# Patient Record
Sex: FEMALE | Race: BLACK OR AFRICAN AMERICAN | NOT HISPANIC OR LATINO
[De-identification: names, ages, dates, MRNs, and addresses within clinical notes are randomized per-mention and may not be internally consistent; named-entity substitution may affect disease eponyms.]

---

## 2017-06-26 ENCOUNTER — RESULT REVIEW (OUTPATIENT)
Age: 57
End: 2017-06-26

## 2020-01-21 ENCOUNTER — RESULT REVIEW (OUTPATIENT)
Age: 60
End: 2020-01-21

## 2020-03-02 ENCOUNTER — APPOINTMENT (OUTPATIENT)
Dept: MRI IMAGING | Facility: IMAGING CENTER | Age: 60
End: 2020-03-02
Payer: COMMERCIAL

## 2020-03-02 ENCOUNTER — OUTPATIENT (OUTPATIENT)
Dept: OUTPATIENT SERVICES | Facility: HOSPITAL | Age: 60
LOS: 1 days | End: 2020-03-02
Payer: COMMERCIAL

## 2020-03-02 DIAGNOSIS — R92.8 OTHER ABNORMAL AND INCONCLUSIVE FINDINGS ON DIAGNOSTIC IMAGING OF BREAST: ICD-10-CM

## 2020-03-02 PROCEDURE — 19085 BX BREAST 1ST LESION MR IMAG: CPT | Mod: LT

## 2020-03-02 PROCEDURE — 77065 DX MAMMO INCL CAD UNI: CPT | Mod: 26,LT

## 2020-03-09 ENCOUNTER — OUTPATIENT (OUTPATIENT)
Dept: OUTPATIENT SERVICES | Facility: HOSPITAL | Age: 60
LOS: 1 days | Discharge: HOME | End: 2020-03-09

## 2020-03-09 DIAGNOSIS — C50.919 MALIGNANT NEOPLASM OF UNSPECIFIED SITE OF UNSPECIFIED FEMALE BREAST: ICD-10-CM

## 2020-03-11 ENCOUNTER — RESULT REVIEW (OUTPATIENT)
Age: 60
End: 2020-03-11

## 2020-03-11 ENCOUNTER — APPOINTMENT (OUTPATIENT)
Dept: MAMMOGRAPHY | Facility: IMAGING CENTER | Age: 60
End: 2020-03-11
Payer: COMMERCIAL

## 2020-03-11 ENCOUNTER — OUTPATIENT (OUTPATIENT)
Dept: OUTPATIENT SERVICES | Facility: HOSPITAL | Age: 60
LOS: 1 days | End: 2020-03-11
Payer: COMMERCIAL

## 2020-03-11 DIAGNOSIS — Z00.8 ENCOUNTER FOR OTHER GENERAL EXAMINATION: ICD-10-CM

## 2020-03-11 PROCEDURE — 88342 IMHCHEM/IMCYTCHM 1ST ANTB: CPT | Mod: 26,59

## 2020-03-11 PROCEDURE — 88360 TUMOR IMMUNOHISTOCHEM/MANUAL: CPT | Mod: 26

## 2020-03-11 PROCEDURE — 77065 DX MAMMO INCL CAD UNI: CPT

## 2020-03-11 PROCEDURE — A4648: CPT

## 2020-03-11 PROCEDURE — 88305 TISSUE EXAM BY PATHOLOGIST: CPT | Mod: 26

## 2020-03-11 PROCEDURE — 19081 BX BREAST 1ST LESION STRTCTC: CPT

## 2020-03-11 PROCEDURE — 88342 IMHCHEM/IMCYTCHM 1ST ANTB: CPT

## 2020-03-11 PROCEDURE — 88341 IMHCHEM/IMCYTCHM EA ADD ANTB: CPT | Mod: 26,59

## 2020-03-11 PROCEDURE — 19081 BX BREAST 1ST LESION STRTCTC: CPT | Mod: LT

## 2020-03-11 PROCEDURE — 88305 TISSUE EXAM BY PATHOLOGIST: CPT

## 2020-03-11 PROCEDURE — 88360 TUMOR IMMUNOHISTOCHEM/MANUAL: CPT

## 2020-03-11 PROCEDURE — 88341 IMHCHEM/IMCYTCHM EA ADD ANTB: CPT

## 2020-03-11 PROCEDURE — 77065 DX MAMMO INCL CAD UNI: CPT | Mod: 26,LT

## 2020-03-13 ENCOUNTER — APPOINTMENT (OUTPATIENT)
Dept: HEMATOLOGY ONCOLOGY | Facility: CLINIC | Age: 60
End: 2020-03-13
Payer: COMMERCIAL

## 2020-03-13 VITALS
HEIGHT: 62 IN | RESPIRATION RATE: 16 BRPM | SYSTOLIC BLOOD PRESSURE: 114 MMHG | BODY MASS INDEX: 33.75 KG/M2 | WEIGHT: 183.42 LBS | TEMPERATURE: 98.5 F | OXYGEN SATURATION: 99 % | DIASTOLIC BLOOD PRESSURE: 70 MMHG | HEART RATE: 64 BPM

## 2020-03-13 PROCEDURE — 99205 OFFICE O/P NEW HI 60 MIN: CPT

## 2020-03-14 NOTE — PHYSICAL EXAM
[Fully active, able to carry on all pre-disease performance without restriction] : Status 0 - Fully active, able to carry on all pre-disease performance without restriction [Normal] : affect appropriate [de-identified] : left axillary LN 1 cm; no palpable left breast mass

## 2020-03-14 NOTE — ASSESSMENT
[FreeTextEntry1] : She is a 59 y/o AAF with mammary carcinoma in the left axillary LN that is ER positive/ ID negative, [id number] negative. We reviewed her MRI of the breast and recent MRI guided biopsy. We reviewed her pathology and explained the significance of LN positive ER positive [id number] negative disease. We explained there are instances where no breast primary can be identified. We will await pathology from breast biopsy. We reviewed potential adjuvant therapy. We reviewed endocrine therapy but potential adjuvant chemotherapy to decrease risk of breast cancer recurrence. We reviewed potential side effects of chemotherapy and her concerns about chemotherapy. We reviewed the role of adjuvant therapy to decrease risk of breast cancer recurrence. We explained the larger size tumor and number of lymph nodes affected. Questions answered to her satisfaction. She understands role of adjuvant therapy to decrease recurrence and will continue with anastrozole until plans for surgery delineated after breast biopsy. Pt was overwhelmed with treatment discussion. We reviewed social work support which she will consider. 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 Will follow up after pathology and surgical follow up.

## 2020-03-14 NOTE — CONSULT LETTER
[Dear  ___] : Dear  [unfilled], [Consult Letter:] : I had the pleasure of evaluating your patient, [unfilled]. [( Thank you for referring [unfilled] for consultation for _____ )] : Thank you for referring [unfilled] for consultation for [unfilled] [Please see my note below.] : Please see my note below. [Consult Closing:] : Thank you very much for allowing me to participate in the care of this patient.  If you have any questions, please do not hesitate to contact me. [Sincerely,] : Sincerely, [FreeTextEntry2] : Heidy Hall MD\par [address] [facility] [address]\[place] [address] [FreeTextEntry3] : Richard Lucia MD\par Attending\par [facility]\par  [DrCoral  ___] : Dr. WALLACE

## 2020-03-14 NOTE — HISTORY OF PRESENT ILLNESS
[Disease: _____________________] : Disease: [unfilled] [AJCC Stage: ____] : AJCC Stage: [unfilled] [de-identified] : Age 60: left axilla biopsy metastatic mammary carcinoma \par Screening mammogram done on 12/7/2019 which showed enlarged left axillary lymph node for which ultrasound guided biopsy was recommended and done on 1/21/2020. Pathology showed metastatic mammary carcinoma with lobular and signet ring cell features that is ER > 90%, [place] 0%, and Ivz6qcm negative. MRI of the breast done 2/13/2020 showed nonmass enhancement in the upper inner left breast measuring 5 cm in AP dimension and abnormal left axillary lymph node with biopsy clip. Also 0.2 cm focus enhancing in the right breast. She underwent MRI guided biopsy of the left breast done on 3/11/2020.  [de-identified] : mammary carcinoma ER > 90%, [place] 0%, Rgc2ikd negative  [de-identified] : She is present to review chemotherapy/ treatment options. She is currently taking AI daily and tolerating without any perceived side effects. She just had MRI guided biopsy of the left breast. She denies any palpable left breast.

## 2020-03-17 LAB — SURGICAL PATHOLOGY STUDY: SIGNIFICANT CHANGE UP

## 2020-04-28 ENCOUNTER — APPOINTMENT (OUTPATIENT)
Dept: MAMMOGRAPHY | Facility: IMAGING CENTER | Age: 60
End: 2020-04-28
Payer: COMMERCIAL

## 2020-04-28 ENCOUNTER — OUTPATIENT (OUTPATIENT)
Dept: OUTPATIENT SERVICES | Facility: HOSPITAL | Age: 60
LOS: 1 days | End: 2020-04-28
Payer: COMMERCIAL

## 2020-04-28 ENCOUNTER — APPOINTMENT (OUTPATIENT)
Dept: ULTRASOUND IMAGING | Facility: IMAGING CENTER | Age: 60
End: 2020-04-28
Payer: COMMERCIAL

## 2020-04-28 DIAGNOSIS — Z00.8 ENCOUNTER FOR OTHER GENERAL EXAMINATION: ICD-10-CM

## 2020-04-28 PROCEDURE — 19281 PERQ DEVICE BREAST 1ST IMAG: CPT | Mod: 59,LT

## 2020-04-28 PROCEDURE — 19285 PERQ DEV BREAST 1ST US IMAG: CPT | Mod: LT

## 2020-04-28 PROCEDURE — 19285 PERQ DEV BREAST 1ST US IMAG: CPT

## 2020-04-28 PROCEDURE — C1739: CPT

## 2020-04-28 PROCEDURE — 19281 PERQ DEVICE BREAST 1ST IMAG: CPT

## 2020-04-30 ENCOUNTER — RESULT REVIEW (OUTPATIENT)
Age: 60
End: 2020-04-30

## 2020-04-30 ENCOUNTER — OUTPATIENT (OUTPATIENT)
Dept: OUTPATIENT SERVICES | Facility: HOSPITAL | Age: 60
LOS: 1 days | End: 2020-04-30
Payer: COMMERCIAL

## 2020-04-30 VITALS
RESPIRATION RATE: 16 BRPM | WEIGHT: 183.87 LBS | DIASTOLIC BLOOD PRESSURE: 73 MMHG | HEIGHT: 62 IN | SYSTOLIC BLOOD PRESSURE: 138 MMHG | HEART RATE: 70 BPM | OXYGEN SATURATION: 70 % | TEMPERATURE: 98 F

## 2020-04-30 DIAGNOSIS — G47.33 OBSTRUCTIVE SLEEP APNEA (ADULT) (PEDIATRIC): ICD-10-CM

## 2020-04-30 DIAGNOSIS — Z90.710 ACQUIRED ABSENCE OF BOTH CERVIX AND UTERUS: Chronic | ICD-10-CM

## 2020-04-30 DIAGNOSIS — Z01.818 ENCOUNTER FOR OTHER PREPROCEDURAL EXAMINATION: ICD-10-CM

## 2020-04-30 DIAGNOSIS — D05.02 LOBULAR CARCINOMA IN SITU OF LEFT BREAST: ICD-10-CM

## 2020-04-30 LAB
ANION GAP SERPL CALC-SCNC: 6 MMOL/L — SIGNIFICANT CHANGE UP (ref 5–17)
BUN SERPL-MCNC: 16 MG/DL — SIGNIFICANT CHANGE UP (ref 7–23)
CALCIUM SERPL-MCNC: 8.8 MG/DL — SIGNIFICANT CHANGE UP (ref 8.4–10.5)
CHLORIDE SERPL-SCNC: 106 MMOL/L — SIGNIFICANT CHANGE UP (ref 96–108)
CO2 SERPL-SCNC: 28 MMOL/L — SIGNIFICANT CHANGE UP (ref 22–31)
CREAT SERPL-MCNC: 0.77 MG/DL — SIGNIFICANT CHANGE UP (ref 0.5–1.3)
GLUCOSE SERPL-MCNC: 98 MG/DL — SIGNIFICANT CHANGE UP (ref 70–99)
HCT VFR BLD CALC: 39.3 % — SIGNIFICANT CHANGE UP (ref 34.5–45)
HGB BLD-MCNC: 12.4 G/DL — SIGNIFICANT CHANGE UP (ref 11.5–15.5)
MCHC RBC-ENTMCNC: 26.3 PG — LOW (ref 27–34)
MCHC RBC-ENTMCNC: 31.6 GM/DL — LOW (ref 32–36)
MCV RBC AUTO: 83.4 FL — SIGNIFICANT CHANGE UP (ref 80–100)
NRBC # BLD: 0 /100 WBCS — SIGNIFICANT CHANGE UP (ref 0–0)
PLATELET # BLD AUTO: 237 K/UL — SIGNIFICANT CHANGE UP (ref 150–400)
POTASSIUM SERPL-MCNC: 4.1 MMOL/L — SIGNIFICANT CHANGE UP (ref 3.5–5.3)
POTASSIUM SERPL-SCNC: 4.1 MMOL/L — SIGNIFICANT CHANGE UP (ref 3.5–5.3)
RBC # BLD: 4.71 M/UL — SIGNIFICANT CHANGE UP (ref 3.8–5.2)
RBC # FLD: 12.6 % — SIGNIFICANT CHANGE UP (ref 10.3–14.5)
SODIUM SERPL-SCNC: 140 MMOL/L — SIGNIFICANT CHANGE UP (ref 135–145)
WBC # BLD: 7.14 K/UL — SIGNIFICANT CHANGE UP (ref 3.8–10.5)
WBC # FLD AUTO: 7.14 K/UL — SIGNIFICANT CHANGE UP (ref 3.8–10.5)

## 2020-04-30 PROCEDURE — 80048 BASIC METABOLIC PNL TOTAL CA: CPT

## 2020-04-30 PROCEDURE — 71046 X-RAY EXAM CHEST 2 VIEWS: CPT | Mod: 26

## 2020-04-30 PROCEDURE — 71046 X-RAY EXAM CHEST 2 VIEWS: CPT

## 2020-04-30 PROCEDURE — 87635 SARS-COV-2 COVID-19 AMP PRB: CPT

## 2020-04-30 PROCEDURE — 93010 ELECTROCARDIOGRAM REPORT: CPT | Mod: NC

## 2020-04-30 PROCEDURE — 36415 COLL VENOUS BLD VENIPUNCTURE: CPT

## 2020-04-30 PROCEDURE — G0463: CPT

## 2020-04-30 PROCEDURE — 85027 COMPLETE CBC AUTOMATED: CPT

## 2020-04-30 PROCEDURE — 93005 ELECTROCARDIOGRAM TRACING: CPT

## 2020-04-30 NOTE — H&P PST ADULT - ACTIVITY
bicycle 1 hour once weekly, heavy housework, 2 flights of stairs adls, chores, able to walk 2/more blocks

## 2020-04-30 NOTE — H&P PST ADULT - NEGATIVE BREAST SYMPTOMS
no breast lump R/no nipple discharge R/no breast tenderness L/no breast tenderness R/no nipple discharge L

## 2020-04-30 NOTE — H&P PST ADULT - NEUROLOGICAL DETAILS
responds to pain/responds to verbal commands/sensation intact/alert and oriented x 3/normal strength normal strength/alert and oriented x 3/sensation intact

## 2020-04-30 NOTE — H&P PST ADULT - PRO INTERPRETER NEED 2
Jammie said she increased Namenda to BID and hasn't had any side effects.  She wants a script sent to [facility] in [place] because she thinks she can get the medication cheaper there than [facility].   English

## 2020-04-30 NOTE — H&P PST ADULT - MUSCULOSKELETAL
No joint pain, swelling or deformity; no limitation of movement negative detailed exam ROM intact/normal strength/no joint swelling/no joint erythema/no joint warmth/no calf tenderness

## 2020-04-30 NOTE — H&P PST ADULT - PSYCHIATRIC
Affect and characteristics of appearance, verbalizations, behaviors are appropriate negative detailed exam

## 2020-04-30 NOTE — H&P PST ADULT - GASTROINTESTINAL DETAILS
soft/no guarding/bowel sounds normal/no rigidity/nontender soft/bowel sounds normal/no masses palpable/no guarding/nontender/no distention

## 2020-04-30 NOTE — H&P PST ADULT - RS GEN PE MLT RESP DETAILS PC
no wheezes/no rales/clear to auscultation bilaterally/no rhonchi airway patent/breath sounds equal/good air movement/clear to auscultation bilaterally/respirations non-labored

## 2020-04-30 NOTE — H&P PST ADULT - HISTORY OF PRESENT ILLNESS
61yo female with medical h/o Left breast cancer and presents today for PST for Mastectomy Partial, Left scheduled for 5/5/2020. NOTE: pt report she had Cathie  done 4/28 at [address] 61yo female with medical h/o Left breast cancer and presents today for PST for Mastectomy Partial, Left scheduled for 5/5/2020. NOTE: pt report she had cliff  done 4/28 at [address]

## 2020-05-01 LAB — SARS-COV-2 RNA SPEC QL NAA+PROBE: SIGNIFICANT CHANGE UP

## 2020-05-04 ENCOUNTER — TRANSCRIPTION ENCOUNTER (OUTPATIENT)
Age: 60
End: 2020-05-04

## 2020-05-05 ENCOUNTER — RESULT REVIEW (OUTPATIENT)
Age: 60
End: 2020-05-05

## 2020-05-05 ENCOUNTER — OUTPATIENT (OUTPATIENT)
Dept: OUTPATIENT SERVICES | Facility: HOSPITAL | Age: 60
LOS: 1 days | End: 2020-05-05
Payer: COMMERCIAL

## 2020-05-05 VITALS
WEIGHT: 183.87 LBS | DIASTOLIC BLOOD PRESSURE: 77 MMHG | HEIGHT: 62 IN | HEART RATE: 63 BPM | OXYGEN SATURATION: 99 % | TEMPERATURE: 98 F | RESPIRATION RATE: 16 BRPM | SYSTOLIC BLOOD PRESSURE: 177 MMHG

## 2020-05-05 VITALS
TEMPERATURE: 98 F | RESPIRATION RATE: 16 BRPM | HEART RATE: 90 BPM | OXYGEN SATURATION: 98 % | SYSTOLIC BLOOD PRESSURE: 118 MMHG | DIASTOLIC BLOOD PRESSURE: 70 MMHG

## 2020-05-05 DIAGNOSIS — Z90.710 ACQUIRED ABSENCE OF BOTH CERVIX AND UTERUS: Chronic | ICD-10-CM

## 2020-05-05 DIAGNOSIS — D05.02 LOBULAR CARCINOMA IN SITU OF LEFT BREAST: ICD-10-CM

## 2020-05-05 LAB — SARS-COV-2 RNA SPEC QL NAA+PROBE: SIGNIFICANT CHANGE UP

## 2020-05-05 PROCEDURE — 88342 IMHCHEM/IMCYTCHM 1ST ANTB: CPT

## 2020-05-05 PROCEDURE — C1889: CPT

## 2020-05-05 PROCEDURE — 14301 TIS TRNFR ANY 30.1-60 SQ CM: CPT

## 2020-05-05 PROCEDURE — 87635 SARS-COV-2 COVID-19 AMP PRB: CPT

## 2020-05-05 PROCEDURE — 88341 IMHCHEM/IMCYTCHM EA ADD ANTB: CPT

## 2020-05-05 PROCEDURE — 38525 BIOPSY/REMOVAL LYMPH NODES: CPT | Mod: LT

## 2020-05-05 PROCEDURE — 38792 RA TRACER ID OF SENTINL NODE: CPT

## 2020-05-05 PROCEDURE — 19302 P-MASTECTOMY W/LN REMOVAL: CPT | Mod: AS,LT

## 2020-05-05 PROCEDURE — 88307 TISSUE EXAM BY PATHOLOGIST: CPT | Mod: 26

## 2020-05-05 PROCEDURE — 88342 IMHCHEM/IMCYTCHM 1ST ANTB: CPT | Mod: 26

## 2020-05-05 PROCEDURE — 76098 X-RAY EXAM SURGICAL SPECIMEN: CPT | Mod: 26

## 2020-05-05 PROCEDURE — 88307 TISSUE EXAM BY PATHOLOGIST: CPT

## 2020-05-05 PROCEDURE — 19301 PARTIAL MASTECTOMY: CPT | Mod: LT

## 2020-05-05 PROCEDURE — 76098 X-RAY EXAM SURGICAL SPECIMEN: CPT

## 2020-05-05 PROCEDURE — 14302 TIS TRNFR ADDL 30 SQ CM: CPT

## 2020-05-05 PROCEDURE — 88341 IMHCHEM/IMCYTCHM EA ADD ANTB: CPT | Mod: 26

## 2020-05-05 PROCEDURE — A9541: CPT

## 2020-05-05 RX ORDER — ONDANSETRON 8 MG/1
4 TABLET, FILM COATED ORAL ONCE
Refills: 0 | Status: DISCONTINUED | OUTPATIENT
Start: 2020-05-05 | End: 2020-05-05

## 2020-05-05 RX ORDER — SODIUM CHLORIDE 9 MG/ML
1000 INJECTION, SOLUTION INTRAVENOUS
Refills: 0 | Status: DISCONTINUED | OUTPATIENT
Start: 2020-05-05 | End: 2020-05-05

## 2020-05-05 RX ORDER — ONDANSETRON 8 MG/1
4 TABLET, FILM COATED ORAL EVERY 6 HOURS
Refills: 0 | Status: DISCONTINUED | OUTPATIENT
Start: 2020-05-05 | End: 2020-05-20

## 2020-05-05 RX ORDER — HYDROMORPHONE HYDROCHLORIDE 2 MG/ML
0.5 INJECTION INTRAMUSCULAR; INTRAVENOUS; SUBCUTANEOUS
Refills: 0 | Status: DISCONTINUED | OUTPATIENT
Start: 2020-05-05 | End: 2020-05-05

## 2020-05-05 RX ORDER — OXYCODONE HYDROCHLORIDE 5 MG/1
5 TABLET ORAL EVERY 6 HOURS
Refills: 0 | Status: DISCONTINUED | OUTPATIENT
Start: 2020-05-05 | End: 2020-05-05

## 2020-05-05 RX ADMIN — SODIUM CHLORIDE 30 MILLILITER(S): 9 INJECTION, SOLUTION INTRAVENOUS at 10:09

## 2020-05-05 NOTE — BRIEF OPERATIVE NOTE - NSICDXBRIEFPROCEDURE_GEN_ALL_CORE_FT
PROCEDURES:  Lumpectomy of left breast with sentinel node biopsy 05-May-2020 13:57:09 using saviscout and lymphoscintography Norma Marquez

## 2020-05-05 NOTE — ASU DISCHARGE PLAN (ADULT/PEDIATRIC) - ASU DC SPECIAL INSTRUCTIONSFT
Wear surgical support bra as advised by Dr. Hall.  Please call Dr. Hall's office for appointment in 7 days. Wear surgical support bra at all times until advised otherwise by Dr. Hall (you may remove bra for showering).  Please call Dr. Hall's office for appointment in 7 days.

## 2020-05-05 NOTE — ASU DISCHARGE PLAN (ADULT/PEDIATRIC) - CARE PROVIDER_API CALL
Heidy Hall)  Surgery  [address] [place] [address], [address]  [place] [address]  Phone: [phone]  Fax: [phone]  Follow Up Time:

## 2020-05-05 NOTE — ASU DISCHARGE PLAN (ADULT/PEDIATRIC) - CALL YOUR DOCTOR IF YOU HAVE ANY OF THE FOLLOWING:
Bleeding that does not stop/Nausea and vomiting that does not stop/Unable to urinate/Pain not relieved by Medications/Fever greater than (need to indicate Fahrenheit or Celsius)/Inability to tolerate liquids or foods/Wound/Surgical Site with redness, or foul smelling discharge or pus

## 2020-05-22 ENCOUNTER — APPOINTMENT (OUTPATIENT)
Dept: CT IMAGING | Facility: IMAGING CENTER | Age: 60
End: 2020-05-22
Payer: COMMERCIAL

## 2020-05-22 ENCOUNTER — OUTPATIENT (OUTPATIENT)
Dept: OUTPATIENT SERVICES | Facility: HOSPITAL | Age: 60
LOS: 1 days | End: 2020-05-22
Payer: COMMERCIAL

## 2020-05-22 DIAGNOSIS — Z90.710 ACQUIRED ABSENCE OF BOTH CERVIX AND UTERUS: Chronic | ICD-10-CM

## 2020-05-22 DIAGNOSIS — C50.919 MALIGNANT NEOPLASM OF UNSPECIFIED SITE OF UNSPECIFIED FEMALE BREAST: ICD-10-CM

## 2020-05-22 PROCEDURE — 71260 CT THORAX DX C+: CPT

## 2020-05-22 PROCEDURE — 71260 CT THORAX DX C+: CPT | Mod: 26

## 2020-05-22 PROCEDURE — 74177 CT ABD & PELVIS W/CONTRAST: CPT

## 2020-05-22 PROCEDURE — 74177 CT ABD & PELVIS W/CONTRAST: CPT | Mod: 26

## 2020-05-26 DIAGNOSIS — K76.9 LIVER DISEASE, UNSPECIFIED: ICD-10-CM

## 2020-06-18 ENCOUNTER — APPOINTMENT (OUTPATIENT)
Dept: NUCLEAR MEDICINE | Facility: IMAGING CENTER | Age: 60
End: 2020-06-18
Payer: COMMERCIAL

## 2020-06-18 ENCOUNTER — OUTPATIENT (OUTPATIENT)
Dept: OUTPATIENT SERVICES | Facility: HOSPITAL | Age: 60
LOS: 1 days | End: 2020-06-18
Payer: COMMERCIAL

## 2020-06-18 ENCOUNTER — RESULT REVIEW (OUTPATIENT)
Age: 60
End: 2020-06-18

## 2020-06-18 DIAGNOSIS — C50.919 MALIGNANT NEOPLASM OF UNSPECIFIED SITE OF UNSPECIFIED FEMALE BREAST: ICD-10-CM

## 2020-06-18 DIAGNOSIS — Z90.710 ACQUIRED ABSENCE OF BOTH CERVIX AND UTERUS: Chronic | ICD-10-CM

## 2020-06-18 PROCEDURE — 78306 BONE IMAGING WHOLE BODY: CPT | Mod: 26

## 2020-06-18 PROCEDURE — 78830 RP LOCLZJ TUM SPECT W/CT 1: CPT

## 2020-06-18 PROCEDURE — 78306 BONE IMAGING WHOLE BODY: CPT

## 2020-06-18 PROCEDURE — 78830 RP LOCLZJ TUM SPECT W/CT 1: CPT | Mod: 26

## 2020-06-18 PROCEDURE — A9561: CPT

## 2020-06-22 ENCOUNTER — APPOINTMENT (OUTPATIENT)
Dept: MRI IMAGING | Facility: IMAGING CENTER | Age: 60
End: 2020-06-22
Payer: COMMERCIAL

## 2020-06-22 ENCOUNTER — RESULT REVIEW (OUTPATIENT)
Age: 60
End: 2020-06-22

## 2020-06-22 ENCOUNTER — OUTPATIENT (OUTPATIENT)
Dept: OUTPATIENT SERVICES | Facility: HOSPITAL | Age: 60
LOS: 1 days | End: 2020-06-22
Payer: COMMERCIAL

## 2020-06-22 DIAGNOSIS — Z00.8 ENCOUNTER FOR OTHER GENERAL EXAMINATION: ICD-10-CM

## 2020-06-22 DIAGNOSIS — Z90.710 ACQUIRED ABSENCE OF BOTH CERVIX AND UTERUS: Chronic | ICD-10-CM

## 2020-06-22 PROCEDURE — 72197 MRI PELVIS W/O & W/DYE: CPT | Mod: 26

## 2020-06-22 PROCEDURE — 72197 MRI PELVIS W/O & W/DYE: CPT

## 2020-06-22 PROCEDURE — 74183 MRI ABD W/O CNTR FLWD CNTR: CPT | Mod: 26

## 2020-06-22 PROCEDURE — 74183 MRI ABD W/O CNTR FLWD CNTR: CPT

## 2020-06-22 PROCEDURE — A9585: CPT

## 2020-06-23 ENCOUNTER — OUTPATIENT (OUTPATIENT)
Dept: OUTPATIENT SERVICES | Facility: HOSPITAL | Age: 60
LOS: 1 days | End: 2020-06-23
Payer: COMMERCIAL

## 2020-06-23 VITALS
RESPIRATION RATE: 14 BRPM | TEMPERATURE: 96 F | HEART RATE: 73 BPM | SYSTOLIC BLOOD PRESSURE: 108 MMHG | WEIGHT: 179.02 LBS | OXYGEN SATURATION: 98 % | HEIGHT: 62 IN | DIASTOLIC BLOOD PRESSURE: 72 MMHG

## 2020-06-23 DIAGNOSIS — Z90.710 ACQUIRED ABSENCE OF BOTH CERVIX AND UTERUS: Chronic | ICD-10-CM

## 2020-06-23 DIAGNOSIS — Z29.9 ENCOUNTER FOR PROPHYLACTIC MEASURES, UNSPECIFIED: ICD-10-CM

## 2020-06-23 DIAGNOSIS — Z98.890 OTHER SPECIFIED POSTPROCEDURAL STATES: Chronic | ICD-10-CM

## 2020-06-23 DIAGNOSIS — C50.912 MALIGNANT NEOPLASM OF UNSPECIFIED SITE OF LEFT FEMALE BREAST: ICD-10-CM

## 2020-06-23 DIAGNOSIS — C50.612 MALIGNANT NEOPLASM OF AXILLARY TAIL OF LEFT FEMALE BREAST: ICD-10-CM

## 2020-06-23 DIAGNOSIS — Z01.818 ENCOUNTER FOR OTHER PREPROCEDURAL EXAMINATION: ICD-10-CM

## 2020-06-23 LAB
ANION GAP SERPL CALC-SCNC: 12 MMOL/L — SIGNIFICANT CHANGE UP (ref 5–17)
BLD GP AB SCN SERPL QL: NEGATIVE — SIGNIFICANT CHANGE UP
BUN SERPL-MCNC: 14 MG/DL — SIGNIFICANT CHANGE UP (ref 7–23)
CALCIUM SERPL-MCNC: 9.4 MG/DL — SIGNIFICANT CHANGE UP (ref 8.4–10.5)
CHLORIDE SERPL-SCNC: 107 MMOL/L — SIGNIFICANT CHANGE UP (ref 96–108)
CO2 SERPL-SCNC: 23 MMOL/L — SIGNIFICANT CHANGE UP (ref 22–31)
CREAT SERPL-MCNC: 0.72 MG/DL — SIGNIFICANT CHANGE UP (ref 0.5–1.3)
GLUCOSE SERPL-MCNC: 100 MG/DL — HIGH (ref 70–99)
HCT VFR BLD CALC: 38.4 % — SIGNIFICANT CHANGE UP (ref 34.5–45)
HGB BLD-MCNC: 12.2 G/DL — SIGNIFICANT CHANGE UP (ref 11.5–15.5)
MCHC RBC-ENTMCNC: 26.7 PG — LOW (ref 27–34)
MCHC RBC-ENTMCNC: 31.8 GM/DL — LOW (ref 32–36)
MCV RBC AUTO: 84 FL — SIGNIFICANT CHANGE UP (ref 80–100)
NRBC # BLD: 0 /100 WBCS — SIGNIFICANT CHANGE UP (ref 0–0)
PLATELET # BLD AUTO: 227 K/UL — SIGNIFICANT CHANGE UP (ref 150–400)
POTASSIUM SERPL-MCNC: 4 MMOL/L — SIGNIFICANT CHANGE UP (ref 3.5–5.3)
POTASSIUM SERPL-SCNC: 4 MMOL/L — SIGNIFICANT CHANGE UP (ref 3.5–5.3)
RBC # BLD: 4.57 M/UL — SIGNIFICANT CHANGE UP (ref 3.8–5.2)
RBC # FLD: 13 % — SIGNIFICANT CHANGE UP (ref 10.3–14.5)
RH IG SCN BLD-IMP: POSITIVE — SIGNIFICANT CHANGE UP
SODIUM SERPL-SCNC: 142 MMOL/L — SIGNIFICANT CHANGE UP (ref 135–145)
WBC # BLD: 6.95 K/UL — SIGNIFICANT CHANGE UP (ref 3.8–10.5)
WBC # FLD AUTO: 6.95 K/UL — SIGNIFICANT CHANGE UP (ref 3.8–10.5)

## 2020-06-23 PROCEDURE — 86850 RBC ANTIBODY SCREEN: CPT

## 2020-06-23 PROCEDURE — 80048 BASIC METABOLIC PNL TOTAL CA: CPT

## 2020-06-23 PROCEDURE — 85027 COMPLETE CBC AUTOMATED: CPT

## 2020-06-23 PROCEDURE — 86900 BLOOD TYPING SEROLOGIC ABO: CPT

## 2020-06-23 PROCEDURE — G0463: CPT

## 2020-06-23 PROCEDURE — 86901 BLOOD TYPING SEROLOGIC RH(D): CPT

## 2020-06-23 RX ORDER — CEFAZOLIN SODIUM 1 G
2000 VIAL (EA) INJECTION ONCE
Refills: 0 | Status: DISCONTINUED | OUTPATIENT
Start: 2020-06-30 | End: 2020-06-30

## 2020-06-23 NOTE — H&P PST ADULT - NSICDXFAMILYHX_GEN_ALL_CORE_FT
FAMILY HISTORY:  FH: pancreatic cancer, mother -  FAMILY HISTORY:  FH: Alzheimers disease, father  FH: pancreatic cancer, mother -   FH: thyroid disease, sister

## 2020-06-23 NOTE — H&P PST ADULT - NSICDXPROBLEM_GEN_ALL_CORE_FT
PROBLEM DIAGNOSES  Problem: Malignant neoplasm of axillary tail of left female breast  Assessment and Plan: -Scheduled for bilateral simple mastectomy; right breast sentinel lymph node biopsy; left breast axillary dissection; bilateral alfred with nerve graft 6/30/20 with Dr. Tarsha Hall  -CBC, BMP, T&S today  -Covid scheduled for 6/27/20 at [facility]  -preop education provided  -No need to stop medications; anaztrazole can be taken am of surgery with a sip of water    Problem: Malignant neoplasm of unspecified site of left female breast  Assessment and Plan:     Problem: Need for prophylactic measure  Assessment and Plan: The Caprini score indicates this patient is at risk for a VTE event (score 3-5).  Most surgical patients in this group would benefit from pharmacologic prophylaxis.  The surgical team will determine the balance between VTE risk and bleeding risk

## 2020-06-23 NOTE — H&P PST ADULT - HISTORY OF PRESENT ILLNESS
60 year old female with history of metastatic breast cancer, axillary adenopathy, carcinoma in situ of cervix, obesity, and sleep apnea (refuses CPAP) presents today for presurgical testing.  According to oncology noted, dated 3/13/20, she had a left axilla biopsy which showed metastatic mammary carcinoma.  Screening mammogram done on 12/7/2019 which showed enlarged left axillary lymph node for which ultrasound guided biopsy was recommended and done on 1/21/2020. Pathology showed metastatic mammary carcinoma with lobular and signet ring cell features that is ER > 90%, [place] 0%, and [id number] negative. MRI of the breast done 2/13/2020 showed nonmass enhancement in the upper inner left breast measuring 5 cm in AP dimension and abnormal left axillary lymph node with biopsy clip. Also 0.2 cm focus enhancing in the right breast. She underwent MRI guided biopsy of the left breast done on 3/11/2020. She denies any palpable left breast.  She is scheduled for bilateral simple mastectomy; right breast sentinel lymph node biopsy; left breast axillary dissection; bilateral alfred with nerve graft 6/30/20 with Dr. Tarsha Hall    Disease: breast cancer    Pathology: mammary carcinoma ER > 90%, [place] 0%, Dvi8iol negative   AJCC Stage: clinical Stage II     COVID scheduled 6/27/20 at [facility]   PCP clearance- Dr. Ly Peters [phone] (scheduled for 6/26/20)

## 2020-06-23 NOTE — H&P PST ADULT - ASSESSMENT
CAPRINI SCORE [CLOT]    AGE RELATED RISK FACTORS                                                       MOBILITY RELATED FACTORS  [X] Age 41-60 years                                            (1 Point)                  [ ] Bed rest                                                        (1 Point)  [ ] Age: 61-74 years                                           (2 Points)                 [ ] Plaster cast                                                   (2 Points)  [ ] Age= 75 years                                              (3 Points)                 [ ] Bed bound for more than 72 hours                 (2 Points)    DISEASE RELATED RISK FACTORS                                               GENDER SPECIFIC FACTORS  [ ] Edema in the lower extremities                       (1 Point)                  [ ] Pregnancy                                                     (1 Point)  [ ] Varicose veins                                               (1 Point)                  [ ] Post-partum < 6 weeks                                   (1 Point)             [X] BMI > 25 Kg/m2                                            (1 Point)                  [X] Hormonal therapy  or oral contraception          (1 Point)                 [ ] Sepsis (in the previous month)                        (1 Point)                  [ ] History of pregnancy complications                 (1 point)  [ ] Pneumonia or serious lung disease                                               [ ] Unexplained or recurrent                     (1 Point)           (in the previous month)                               (1 Point)  [ ] Abnormal pulmonary function test                     (1 Point)                 SURGERY RELATED RISK FACTORS  [ ] Acute myocardial infarction                              (1 Point)                 [ ]  Section                                             (1 Point)  [ ] Congestive heart failure (in the previous month)  (1 Point)               [ ] Minor surgery                                                  (1 Point)   [ ] Inflammatory bowel disease                             (1 Point)                 [ ] Arthroscopic surgery                                        (2 Points)  [ ] Central venous access                                      (2 Points)                [X] General surgery lasting more than 45 minutes   (2 Points)       [ ] Stroke (in the previous month)                          (5 Points)               [ ] Elective arthroplasty                                         (5 Points)                                                                                                                                               HEMATOLOGY RELATED FACTORS                                                 TRAUMA RELATED RISK FACTORS  [ ] Prior episodes of VTE                                     (3 Points)                [ ] Fracture of the hip, pelvis, or leg                       (5 Points)  [ ] Positive family history for VTE                         (3 Points)                 [ ] Acute spinal cord injury (in the previous month)  (5 Points)  [ ] Prothrombin 37611 A                                     (3 Points)                 [ ] Paralysis  (less than 1 month)                             (5 Points)  [ ] Factor V Leiden                                             (3 Points)                  [ ] Multiple Trauma within 1 month                        (5 Points)  [ ] Lupus anticoagulants                                     (3 Points)                                                           [ ] Anticardiolipin antibodies                               (3 Points)                                                       [ ] High homocysteine in the blood                      (3 Points)                                             [ ] Other congenital or acquired thrombophilia      (3 Points)                                                [ ] Heparin induced thrombocytopenia                  (3 Points)                                          Total Score [     5    ]

## 2020-06-23 NOTE — H&P PST ADULT - NSICDXPASTSURGICALHX_GEN_ALL_CORE_FT
PAST SURGICAL HISTORY:  S/P hysterectomy 2015 PAST SURGICAL HISTORY:  H/O breast biopsy open    S/P hysterectomy 2015    S/P LEEP (loop electrosurgical excision procedure)

## 2020-06-23 NOTE — H&P PST ADULT - RS GEN PE MLT RESP DETAILS PC
good air movement/respirations non-labored/no rhonchi/airway patent/no wheezes/normal/no rales/breath sounds equal/clear to auscultation bilaterally

## 2020-06-23 NOTE — H&P PST ADULT - NSICDXPASTMEDICALHX_GEN_ALL_CORE_FT
PAST MEDICAL HISTORY:  Obesity     JOSH (obstructive sleep apnea) CPAP recommended, pt refused PAST MEDICAL HISTORY:  Axillary adenopathy     Cervix carcinoma in situ     Metastatic breast cancer     Obesity     JOSH (obstructive sleep apnea) CPAP recommended, pt refused

## 2020-06-23 NOTE — H&P PST ADULT - PULMONARY EMBOLUS
yes/ACTIVITY: Bedrest/increase as tolerated; consult with Louann Long RN --> pt. OK for PT as tolerated
no

## 2020-06-27 ENCOUNTER — OUTPATIENT (OUTPATIENT)
Dept: OUTPATIENT SERVICES | Facility: HOSPITAL | Age: 60
LOS: 1 days | End: 2020-06-27
Payer: COMMERCIAL

## 2020-06-27 DIAGNOSIS — Z98.890 OTHER SPECIFIED POSTPROCEDURAL STATES: Chronic | ICD-10-CM

## 2020-06-27 DIAGNOSIS — Z11.59 ENCOUNTER FOR SCREENING FOR OTHER VIRAL DISEASES: ICD-10-CM

## 2020-06-27 DIAGNOSIS — Z90.710 ACQUIRED ABSENCE OF BOTH CERVIX AND UTERUS: Chronic | ICD-10-CM

## 2020-06-27 PROBLEM — R59.0 LOCALIZED ENLARGED LYMPH NODES: Chronic | Status: ACTIVE | Noted: 2020-06-23

## 2020-06-27 LAB — SARS-COV-2 RNA SPEC QL NAA+PROBE: SIGNIFICANT CHANGE UP

## 2020-06-27 PROCEDURE — U0003: CPT

## 2020-06-29 ENCOUNTER — TRANSCRIPTION ENCOUNTER (OUTPATIENT)
Age: 60
End: 2020-06-29

## 2020-06-30 ENCOUNTER — RESULT REVIEW (OUTPATIENT)
Age: 60
End: 2020-06-30

## 2020-06-30 ENCOUNTER — INPATIENT (INPATIENT)
Facility: HOSPITAL | Age: 60
LOS: 2 days | Discharge: HOME | DRG: 580 | End: 2020-07-03
Attending: PLASTIC SURGERY | Admitting: SPECIALIST
Payer: COMMERCIAL

## 2020-06-30 ENCOUNTER — APPOINTMENT (OUTPATIENT)
Dept: NUCLEAR MEDICINE | Facility: HOSPITAL | Age: 60
End: 2020-06-30

## 2020-06-30 VITALS
DIASTOLIC BLOOD PRESSURE: 82 MMHG | SYSTOLIC BLOOD PRESSURE: 124 MMHG | TEMPERATURE: 98 F | RESPIRATION RATE: 16 BRPM | WEIGHT: 179.02 LBS | HEART RATE: 62 BPM | OXYGEN SATURATION: 100 % | HEIGHT: 62 IN

## 2020-06-30 DIAGNOSIS — Z90.710 ACQUIRED ABSENCE OF BOTH CERVIX AND UTERUS: Chronic | ICD-10-CM

## 2020-06-30 DIAGNOSIS — C50.612 MALIGNANT NEOPLASM OF AXILLARY TAIL OF LEFT FEMALE BREAST: ICD-10-CM

## 2020-06-30 DIAGNOSIS — Z98.890 OTHER SPECIFIED POSTPROCEDURAL STATES: Chronic | ICD-10-CM

## 2020-06-30 DIAGNOSIS — C50.912 MALIGNANT NEOPLASM OF UNSPECIFIED SITE OF LEFT FEMALE BREAST: ICD-10-CM

## 2020-06-30 LAB
ANION GAP SERPL CALC-SCNC: 14 MMOL/L — SIGNIFICANT CHANGE UP (ref 5–17)
APTT BLD: 25 SEC — LOW (ref 27.5–35.5)
BUN SERPL-MCNC: 14 MG/DL — SIGNIFICANT CHANGE UP (ref 7–23)
CALCIUM SERPL-MCNC: 7.8 MG/DL — LOW (ref 8.4–10.5)
CHLORIDE SERPL-SCNC: 106 MMOL/L — SIGNIFICANT CHANGE UP (ref 96–108)
CO2 SERPL-SCNC: 20 MMOL/L — LOW (ref 22–31)
CREAT SERPL-MCNC: 0.76 MG/DL — SIGNIFICANT CHANGE UP (ref 0.5–1.3)
GAS PNL BLDV: SIGNIFICANT CHANGE UP
GLUCOSE SERPL-MCNC: 195 MG/DL — HIGH (ref 70–99)
HCT VFR BLD CALC: 29.9 % — LOW (ref 34.5–45)
HGB BLD-MCNC: 9.5 G/DL — LOW (ref 11.5–15.5)
INR BLD: 1.27 RATIO — HIGH (ref 0.88–1.16)
MAGNESIUM SERPL-MCNC: 1.7 MG/DL — SIGNIFICANT CHANGE UP (ref 1.6–2.6)
MCHC RBC-ENTMCNC: 26.5 PG — LOW (ref 27–34)
MCHC RBC-ENTMCNC: 31.8 GM/DL — LOW (ref 32–36)
MCV RBC AUTO: 83.5 FL — SIGNIFICANT CHANGE UP (ref 80–100)
NRBC # BLD: 0 /100 WBCS — SIGNIFICANT CHANGE UP (ref 0–0)
PHOSPHATE SERPL-MCNC: 4.8 MG/DL — HIGH (ref 2.5–4.5)
PLATELET # BLD AUTO: 226 K/UL — SIGNIFICANT CHANGE UP (ref 150–400)
POTASSIUM SERPL-MCNC: 4.1 MMOL/L — SIGNIFICANT CHANGE UP (ref 3.5–5.3)
POTASSIUM SERPL-SCNC: 4.1 MMOL/L — SIGNIFICANT CHANGE UP (ref 3.5–5.3)
PROTHROM AB SERPL-ACNC: 14.4 SEC — HIGH (ref 10.6–13.6)
RBC # BLD: 3.58 M/UL — LOW (ref 3.8–5.2)
RBC # FLD: 12.9 % — SIGNIFICANT CHANGE UP (ref 10.3–14.5)
SODIUM SERPL-SCNC: 140 MMOL/L — SIGNIFICANT CHANGE UP (ref 135–145)
WBC # BLD: 13.39 K/UL — HIGH (ref 3.8–10.5)
WBC # FLD AUTO: 13.39 K/UL — HIGH (ref 3.8–10.5)

## 2020-06-30 PROCEDURE — 88331 PATH CONSLTJ SURG 1 BLK 1SPC: CPT | Mod: 26

## 2020-06-30 PROCEDURE — 77065 DX MAMMO INCL CAD UNI: CPT

## 2020-06-30 PROCEDURE — 88305 TISSUE EXAM BY PATHOLOGIST: CPT | Mod: 26

## 2020-06-30 PROCEDURE — 88331 PATH CONSLTJ SURG 1 BLK 1SPC: CPT

## 2020-06-30 PROCEDURE — 88342 IMHCHEM/IMCYTCHM 1ST ANTB: CPT | Mod: 26

## 2020-06-30 PROCEDURE — 88341 IMHCHEM/IMCYTCHM EA ADD ANTB: CPT

## 2020-06-30 PROCEDURE — 19085 BX BREAST 1ST LESION MR IMAG: CPT

## 2020-06-30 PROCEDURE — 88307 TISSUE EXAM BY PATHOLOGIST: CPT | Mod: 26

## 2020-06-30 PROCEDURE — 88305 TISSUE EXAM BY PATHOLOGIST: CPT

## 2020-06-30 PROCEDURE — 88341 IMHCHEM/IMCYTCHM EA ADD ANTB: CPT | Mod: 26

## 2020-06-30 PROCEDURE — A9585: CPT

## 2020-06-30 PROCEDURE — 88307 TISSUE EXAM BY PATHOLOGIST: CPT

## 2020-06-30 RX ORDER — POLYETHYLENE GLYCOL 3350 17 G/17G
17 POWDER, FOR SOLUTION ORAL DAILY
Refills: 0 | Status: DISCONTINUED | OUTPATIENT
Start: 2020-06-30 | End: 2020-07-03

## 2020-06-30 RX ORDER — CEFAZOLIN SODIUM 1 G
2000 VIAL (EA) INJECTION EVERY 8 HOURS
Refills: 0 | Status: COMPLETED | OUTPATIENT
Start: 2020-06-30 | End: 2020-07-01

## 2020-06-30 RX ORDER — CHLORHEXIDINE GLUCONATE 213 G/1000ML
1 SOLUTION TOPICAL ONCE
Refills: 0 | Status: DISCONTINUED | OUTPATIENT
Start: 2020-06-30 | End: 2020-06-30

## 2020-06-30 RX ORDER — OXYCODONE HYDROCHLORIDE 5 MG/1
10 TABLET ORAL EVERY 4 HOURS
Refills: 0 | Status: DISCONTINUED | OUTPATIENT
Start: 2020-06-30 | End: 2020-06-30

## 2020-06-30 RX ORDER — HYDROMORPHONE HYDROCHLORIDE 2 MG/ML
0.5 INJECTION INTRAMUSCULAR; INTRAVENOUS; SUBCUTANEOUS EVERY 6 HOURS
Refills: 0 | Status: DISCONTINUED | OUTPATIENT
Start: 2020-06-30 | End: 2020-07-01

## 2020-06-30 RX ORDER — ACETAMINOPHEN 500 MG
650 TABLET ORAL EVERY 6 HOURS
Refills: 0 | Status: DISCONTINUED | OUTPATIENT
Start: 2020-06-30 | End: 2020-06-30

## 2020-06-30 RX ORDER — OXYCODONE HYDROCHLORIDE 5 MG/1
5 TABLET ORAL EVERY 4 HOURS
Refills: 0 | Status: DISCONTINUED | OUTPATIENT
Start: 2020-06-30 | End: 2020-07-03

## 2020-06-30 RX ORDER — SODIUM CHLORIDE 9 MG/ML
3 INJECTION INTRAMUSCULAR; INTRAVENOUS; SUBCUTANEOUS EVERY 8 HOURS
Refills: 0 | Status: DISCONTINUED | OUTPATIENT
Start: 2020-06-30 | End: 2020-06-30

## 2020-06-30 RX ORDER — CALCIUM CARBONATE 500(1250)
1 TABLET ORAL EVERY 4 HOURS
Refills: 0 | Status: DISCONTINUED | OUTPATIENT
Start: 2020-06-30 | End: 2020-06-30

## 2020-06-30 RX ORDER — HEPARIN SODIUM 5000 [USP'U]/ML
5000 INJECTION INTRAVENOUS; SUBCUTANEOUS EVERY 8 HOURS
Refills: 0 | Status: DISCONTINUED | OUTPATIENT
Start: 2020-06-30 | End: 2020-07-03

## 2020-06-30 RX ORDER — HYDROMORPHONE HYDROCHLORIDE 2 MG/ML
0.5 INJECTION INTRAMUSCULAR; INTRAVENOUS; SUBCUTANEOUS EVERY 4 HOURS
Refills: 0 | Status: DISCONTINUED | OUTPATIENT
Start: 2020-06-30 | End: 2020-06-30

## 2020-06-30 RX ORDER — OXYCODONE HYDROCHLORIDE 5 MG/1
5 TABLET ORAL EVERY 4 HOURS
Refills: 0 | Status: DISCONTINUED | OUTPATIENT
Start: 2020-06-30 | End: 2020-06-30

## 2020-06-30 RX ORDER — OXYCODONE HYDROCHLORIDE 5 MG/1
10 TABLET ORAL EVERY 6 HOURS
Refills: 0 | Status: DISCONTINUED | OUTPATIENT
Start: 2020-06-30 | End: 2020-07-03

## 2020-06-30 RX ORDER — ACETAMINOPHEN 500 MG
1000 TABLET ORAL EVERY 6 HOURS
Refills: 0 | Status: COMPLETED | OUTPATIENT
Start: 2020-07-01 | End: 2020-07-01

## 2020-06-30 RX ORDER — DIAZEPAM 5 MG
5 TABLET ORAL EVERY 8 HOURS
Refills: 0 | Status: DISCONTINUED | OUTPATIENT
Start: 2020-06-30 | End: 2020-06-30

## 2020-06-30 RX ORDER — SODIUM CHLORIDE 9 MG/ML
1000 INJECTION, SOLUTION INTRAVENOUS
Refills: 0 | Status: DISCONTINUED | OUTPATIENT
Start: 2020-06-30 | End: 2020-07-01

## 2020-06-30 RX ORDER — LIDOCAINE HCL 20 MG/ML
0.2 VIAL (ML) INJECTION ONCE
Refills: 0 | Status: DISCONTINUED | OUTPATIENT
Start: 2020-06-30 | End: 2020-06-30

## 2020-06-30 RX ORDER — ACETAMINOPHEN 500 MG
1000 TABLET ORAL EVERY 6 HOURS
Refills: 0 | Status: DISCONTINUED | OUTPATIENT
Start: 2020-06-30 | End: 2020-06-30

## 2020-06-30 RX ORDER — SENNA PLUS 8.6 MG/1
2 TABLET ORAL AT BEDTIME
Refills: 0 | Status: DISCONTINUED | OUTPATIENT
Start: 2020-06-30 | End: 2020-06-30

## 2020-06-30 RX ORDER — DIPHENHYDRAMINE HCL 50 MG
25 CAPSULE ORAL EVERY 4 HOURS
Refills: 0 | Status: DISCONTINUED | OUTPATIENT
Start: 2020-06-30 | End: 2020-06-30

## 2020-06-30 RX ORDER — SENNA PLUS 8.6 MG/1
2 TABLET ORAL AT BEDTIME
Refills: 0 | Status: DISCONTINUED | OUTPATIENT
Start: 2020-06-30 | End: 2020-07-03

## 2020-06-30 RX ORDER — IBUPROFEN 200 MG
400 TABLET ORAL EVERY 6 HOURS
Refills: 0 | Status: DISCONTINUED | OUTPATIENT
Start: 2020-06-30 | End: 2020-06-30

## 2020-06-30 RX ORDER — KETOROLAC TROMETHAMINE 30 MG/ML
15 SYRINGE (ML) INJECTION EVERY 6 HOURS
Refills: 0 | Status: DISCONTINUED | OUTPATIENT
Start: 2020-06-30 | End: 2020-07-03

## 2020-06-30 RX ADMIN — HEPARIN SODIUM 5000 UNIT(S): 5000 INJECTION INTRAVENOUS; SUBCUTANEOUS at 22:16

## 2020-06-30 RX ADMIN — Medication 15 MILLIGRAM(S): at 22:16

## 2020-06-30 RX ADMIN — Medication 100 MILLIGRAM(S): at 22:16

## 2020-06-30 NOTE — PROVIDER CONTACT NOTE (OTHER) - SITUATION
Pt urine output has blue discoloration. Pt right breast &  BL feet have blue discoloration. Pt BL breast flaps assessed with pulse oximetry q1hr. Unable to doppler BL flap site.

## 2020-06-30 NOTE — CONSULT NOTE ADULT - SUBJECTIVE AND OBJECTIVE BOX
HISTORY:  59 y/o female with a PMHx of JOSH (patient refuses to use CPAP), cervical CA s/p LEEP, s/p RENE, and stage II breast CA who presented today for a scheduled bilateral simple mastectomy, right sentinel lymph node biopsy, left axillary lymph node dissection, and bilateral МАРИНА free flaps HISTORY:  59 y/o female with a PMHx of JOSH (patient refuses to use CPAP), cervical CA s/p LEEP, s/p RENE, and recurrent stage II invasive lobular carcinoma s/p left lumpectomy & left sentinel lymph node biopsy (positive in 5 out of 7 nodes) back in May 2020 who presented today for a scheduled bilateral simple mastectomy, right sentinel lymph node biopsy, completion left axillary lymph node dissection, and bilateral МАРИНА free flaps w/ inguinal lymph node transfer from right abdomen to left breast & cutaneous nerve grafting from chest to flaps. Case was uneventful and patient was extubated & hemodynamically stable at the end of the case. SICU consulted for frequent flap checks.    PAST MEDICAL & SURGICAL HISTORY:  - Breast cancer s/p left lumpectomy & left sentinel lymph node biopsy (positive in 5 out of 7 nodes) - 05/05/2020  - Cervix carcinoma in situ s/p LEEP  - S/P RNEE  - JOSH (obstructive sleep apnea) - refuses to use CPAP    HOME MEDICATIONS: anastrozole 1 mg oral tablet: 1 tab(s) orally once a day (30 Jun 2020 06:50)    ALLERGIES: No Known Allergies    SOCIAL HISTORY: Denies EtOH, tobacco, and illicit substance use    FAMILY HISTORY: Pancreatic cancer in first degree relative    CODE STATUS: Full code    VITAL SIGNS:  ICU Vital Signs Last 24 Hrs  T(C): 36.1 (30 Jun 2020 23:00), Max: 36.6 (30 Jun 2020 06:59)  T(F): 97 (30 Jun 2020 23:00), Max: 97.9 (30 Jun 2020 06:59)  HR: 91 (01 Jul 2020 00:00) (62 - 104)  BP: 112/55 (01 Jul 2020 00:00) (110/59 - 136/70)  BP(mean): 75 (01 Jul 2020 00:00) (75 - 97)  RR: 22 (01 Jul 2020 00:00) (10 - 22)  SpO2: 100% (01 Jul 2020 00:00) (97% - 100%)    LABS:    VBG - ( 30 Jun 2020 20:06 )  pH: 7.29  /  pCO2: 48    /  pO2: 86    / HCO3: 22    / Base Excess: -3.6  /  SaO2: 96     Lactate: 2.9      140  |  106  |  14  ----------------------------<  195<H>  4.1   |  20<L>  |  0.76    Ca    7.8<L>      30 Jun 2020 20:05  Phos  4.8     06-30  Mg     1.7     06-30               9.5    13.39 )-----------( 226      ( 30 Jun 2020 20:05 )             29.9     PT/INR - ( 30 Jun 2020 20:05 )   PT: 14.4 sec;   INR: 1.27 ratio         PTT - ( 30 Jun 2020 20:05 )  PTT:25.0 sec    IMAGING STUDIES: HISTORY:  59 y/o female with a PMHx of JOSH (patient refuses to use CPAP), cervical CA s/p LEEP, s/p RENE, and recurrent stage II invasive lobular carcinoma s/p left lumpectomy & left sentinel lymph node biopsy (positive in 5 out of 7 nodes) back in May 2020 who presented today for a scheduled bilateral simple mastectomy, right sentinel lymph node biopsy, completion left axillary lymph node dissection, and bilateral МАРИНА free flaps w/ inguinal lymph node transfer from right abdomen to left breast & cutaneous nerve grafting from chest to flaps. Case was uneventful and patient was extubated & hemodynamically stable at the end of the case. SICU consulted for frequent flap checks.    PAST MEDICAL & SURGICAL HISTORY:  - Breast cancer s/p left lumpectomy & left sentinel lymph node biopsy (positive in 5 out of 7 nodes) - 05/05/2020  - Cervix carcinoma in situ s/p LEEP  - S/P RENE  - JOSH (obstructive sleep apnea) - refuses to use CPAP    HOME MEDICATIONS: anastrozole 1 mg oral tablet: 1 tab(s) orally once a day (30 Jun 2020 06:50)    ALLERGIES: No Known Allergies    SOCIAL HISTORY: Denies EtOH, tobacco, and illicit substance use    FAMILY HISTORY: Pancreatic cancer in first degree relative    CODE STATUS: Full code    VITAL SIGNS:  ICU Vital Signs Last 24 Hrs  T(C): 36.1 (30 Jun 2020 23:00), Max: 36.6 (30 Jun 2020 06:59)  T(F): 97 (30 Jun 2020 23:00), Max: 97.9 (30 Jun 2020 06:59)  HR: 91 (01 Jul 2020 00:00) (62 - 104)  BP: 112/55 (01 Jul 2020 00:00) (110/59 - 136/70)  BP(mean): 75 (01 Jul 2020 00:00) (75 - 97)  RR: 22 (01 Jul 2020 00:00) (10 - 22)  SpO2: 100% (01 Jul 2020 00:00) (97% - 100%)    PHYSICAL EXAMINATION:  General - well-nourished, no acute distress  Neuro - awake, alert, oriented x4, no acute focal deficits  HEENT - normocephalic, PERRL, moist mucous membranes  Lungs - clear to auscultation bilaterally  Chest - bilateral mastectomies incisions C/D/I, Vioptix w/ 68% O2 sat on left breast and 50% O2 sat on right breast, left surgical drains x2 and right surgical drains x2 with serosanguineous output  Heart - regular rate and rhythm, S1S2  Abdomen - soft, nondistended, abdominal surgical incision dressing C/D/I with minimal strikethrough, bilateral abdominal drains with serosanguineous output   - Calderón to gravity w/ blue urine (expected as methylene blue was used for lymph node biopsy and dissection)  Extremities - all four extremities are warm & pink with 2+ pulses, strength 5/5, sensation intact    LABS:               9.5    13.39 )-----------( 226      ( 30 Jun 2020 20:05 )             29.9     140  |  106  |  14  ----------------------------<  195<H>  4.1   |  20<L>  |  0.76    Ca    7.8<L>      30 Jun 2020 20:05  Phos  4.8  Mg     1.7    PT/INR - ( 30 Jun 2020 20:05 )   PT: 14.4 sec;   INR: 1.27 ratio    PTT - ( 30 Jun 2020 20:05 )  PTT:25.0 sec    VBG - ( 30 Jun 2020 20:06 )  pH: 7.29  /  pCO2: 48    /  pO2: 86    / HCO3: 22    / Base Excess: -3.6  /  SaO2: 96   /    Lactate: 2.9      IMAGING STUDIES: None HISTORY:  59 y/o female with a PMHx of JOSH (patient refuses to use CPAP), cervical CA s/p LEEP, s/p RENE, and recurrent stage II invasive lobular carcinoma s/p left lumpectomy & left sentinel lymph node biopsy (positive in 5 out of 7 nodes) back in May 2020 who presented today for a scheduled bilateral simple mastectomy, right sentinel lymph node biopsy, completion left axillary lymph node dissection, and bilateral МАРИНА free flaps w/ inguinal lymph node transfer from right abdomen to left breast & cutaneous nerve grafting from chest to flaps. Case was uneventful and patient was extubated & hemodynamically stable at the end of the case. She received 5 L of crystalloid and 500 mL of 5% albumin. UOP was 1000 mL and EBL was 300 mL. SICU consulted for frequent flap checks.    PAST MEDICAL & SURGICAL HISTORY:  - Breast cancer s/p left lumpectomy & left sentinel lymph node biopsy (positive in 5 out of 7 nodes) - 05/05/2020  - Cervix carcinoma in situ s/p LEEP  - S/P RENE  - JOSH (obstructive sleep apnea) - refuses to use CPAP    HOME MEDICATIONS: anastrozole 1 mg oral tablet: 1 tab(s) orally once a day (30 Jun 2020 06:50)    ALLERGIES: No Known Allergies    SOCIAL HISTORY: Denies EtOH, tobacco, and illicit substance use    FAMILY HISTORY: Pancreatic cancer in first degree relative    CODE STATUS: Full code    VITAL SIGNS:  ICU Vital Signs Last 24 Hrs  T(C): 36.1 (30 Jun 2020 23:00), Max: 36.6 (30 Jun 2020 06:59)  T(F): 97 (30 Jun 2020 23:00), Max: 97.9 (30 Jun 2020 06:59)  HR: 91 (01 Jul 2020 00:00) (62 - 104)  BP: 112/55 (01 Jul 2020 00:00) (110/59 - 136/70)  BP(mean): 75 (01 Jul 2020 00:00) (75 - 97)  RR: 22 (01 Jul 2020 00:00) (10 - 22)  SpO2: 100% (01 Jul 2020 00:00) (97% - 100%)    PHYSICAL EXAMINATION:  General - well-nourished, no acute distress  Neuro - awake, alert, oriented x4, no acute focal deficits  HEENT - normocephalic, PERRL, moist mucous membranes  Lungs - clear to auscultation bilaterally  Chest - bilateral mastectomies incisions C/D/I, Vioptix w/ 68% O2 sat on left breast and 50% O2 sat on right breast, left surgical drains x2 and right surgical drains x2 with serosanguineous output  Heart - regular rate and rhythm, S1S2  Abdomen - soft, nondistended, abdominal surgical incision dressing C/D/I with minimal strikethrough, bilateral abdominal drains with serosanguineous output   - Calderón to gravity w/ blue urine (expected as methylene blue was used for lymph node biopsy and dissection)  Extremities - all four extremities are warm & pink with 2+ pulses, strength 5/5, sensation intact    LABS:               9.5    13.39 )-----------( 226      ( 30 Jun 2020 20:05 )             29.9     140  |  106  |  14  ----------------------------<  195<H>  4.1   |  20<L>  |  0.76    Ca    7.8<L>      30 Jun 2020 20:05  Phos  4.8  Mg     1.7    PT/INR - ( 30 Jun 2020 20:05 )   PT: 14.4 sec;   INR: 1.27 ratio    PTT - ( 30 Jun 2020 20:05 )  PTT:25.0 sec    VBG - ( 30 Jun 2020 20:06 )  pH: 7.29  /  pCO2: 48    /  pO2: 86    / HCO3: 22    / Base Excess: -3.6  /  SaO2: 96   /    Lactate: 2.9      IMAGING STUDIES: None

## 2020-06-30 NOTE — BRIEF OPERATIVE NOTE - NSICDXBRIEFPROCEDURE_GEN_ALL_CORE_FT
PROCEDURES:  Mastectomy with axillary node dissection 30-Jun-2020 14:16:16 Left axillary lymph node dissection Do Yo  Bilateral mastectomy with sentinel lymph node biopsy 30-Jun-2020 14:14:49  Do Yo
PROCEDURES:  Vascularized lymph node transfer 30-Jun-2020 18:46:45  Rigo Salazar  МАРИНА flap, free 30-Jun-2020 18:46:27  Rigo Salazar

## 2020-06-30 NOTE — BRIEF OPERATIVE NOTE - OPERATION/FINDINGS
Bilateral mastectomy, right sentinel lymph node biopsy, left axillary lymph node dissection
After general surgery portion of procedure, immediate bilateral breast reconstruction with bilateral МАРИНА free flaps. 3.0 venous couplers used bilaterally. Inguinal lymph node transfer along with МАРИНА flap from right abdomen to left breast. Cutaneous nerve grafting performed from chest to flaps.

## 2020-06-30 NOTE — BRIEF OPERATIVE NOTE - NSICDXBRIEFPREOP_GEN_ALL_CORE_FT
PRE-OP DIAGNOSIS:  Malignant neoplasm of left breast 30-Jun-2020 14:17:56  Do Yo
PRE-OP DIAGNOSIS:  Malignant neoplasm of left breast 30-Jun-2020 14:17:56  Do Yo

## 2020-06-30 NOTE — CONSULT NOTE ADULT - ASSESSMENT
59 y/o female s/p bilateral simple mastectomy, right sentinel lymph node biopsy, completion left axillary lymph node dissection, and bilateral МАРИНА free flaps w/ inguinal lymph node transfer from right abdomen to left breast & cutaneous nerve grafting from chest to flaps    PLAN:    Neuro: acute post-op pain  - Monitor mental status  - Multimodal pain control with IV acetaminophen, ketorolac oxycodone, and Dilaudid    Resp: JOSH (refuses CPAP)  - Monitor pulse oximeter, will monitor closely as patient has history of JOSH but refused CPAP in the past, will offer CPAP again if needed  - Out of bed to chair, ambulate as tolerated, and incentive spirometry to prevent atelectasis    CV: elevated lactate  - Monitor vital signs  - Will hydrate with LR at 100 mL/hr for elevated lactate of 2.9 and recheck in the AM    GI: no acute issues  - NPO except medications for now as patient is POD #0 w/ МАРНИА flaps  - Bowel regimen with senna & Miralax    Renal: no acute issues  - Monitor I&Os  - Monitor electrolytes and replete as necessary  - LR at 100 mL/hr while NPO and for resuscitation in the setting of elevated lactate of 2.9    Heme: no acute issues  - Monitor CBC and coags  - Lovenox for VTE prophylaxis    ID: no acute issues  - Monitor for clinical evidence of active infection  - Ancef for em-operative antibiotics    Endo: no acute issues  - Monitor glucose on BMP    Breast: s/p bilateral simple mastectomy, right sentinel lymph node biopsy, completion left axillary lymph node dissection, and bilateral МАРИНА free flaps w/ inguinal lymph node transfer from right abdomen to left breast & cutaneous nerve grafting from chest to flaps  - Follow-up pathology  - Monitor МАРИНА flaps O2 saturation every hour, will notify PRS if O2 saturation decreased by more than 10% from baseline (68% for left breast and 50% for right breast when patient was leaving the OR)  - Monitor drain outputs    Disposition:  - Full code  - Will admit to ABHILASH Perrin PA-C     [id number] 59 y/o female s/p bilateral simple mastectomy, right sentinel lymph node biopsy, completion left axillary lymph node dissection, and bilateral МАРИНА free flaps w/ inguinal lymph node transfer from right abdomen to left breast & cutaneous nerve grafting from chest to flaps    PLAN:    Neuro: acute post-op pain  - Monitor mental status  - Multimodal pain control with IV acetaminophen, ketorolac oxycodone, and Dilaudid    Resp: JOSH (refuses CPAP)  - Monitor pulse oximeter, will monitor closely as patient has history of JOSH but refused CPAP in the past, will offer CPAP again if needed  - Out of bed to chair, ambulate as tolerated, and incentive spirometry to prevent atelectasis    CV: elevated lactate  - Monitor vital signs  - Will hydrate with LR at 100 mL/hr for elevated lactate of 2.9 and recheck in the AM    GI: no acute issues  - NPO except medications for now as patient is POD #0 w/ МАРИНА flaps  - Bowel regimen with senna & Miralax    Renal: no acute issues  - Monitor I&Os  - Monitor electrolytes and replete as necessary  - LR at 100 mL/hr while NPO and for resuscitation in the setting of elevated lactate of 2.9    Heme: acute blood loss anemia  - Monitor CBC and coags, HCT dropped from 38.4 pre-op to 29.9 post-operatively likely due to blood loss in the OR  - Lovenox for VTE prophylaxis    ID: no acute issues  - Monitor for clinical evidence of active infection  - Ancef for em-operative antibiotics    Endo: no acute issues  - Monitor glucose on BMP    Breast: s/p bilateral simple mastectomy, right sentinel lymph node biopsy, completion left axillary lymph node dissection, and bilateral МАРИНА free flaps w/ inguinal lymph node transfer from right abdomen to left breast & cutaneous nerve grafting from chest to flaps  - Follow-up pathology  - Monitor МАРИНА flaps O2 saturation every hour, will notify PRS if O2 saturation decreased by more than 10% from baseline (68% for left breast and 50% for right breast when patient was leaving the OR)  - Monitor drain outputs    Disposition:  - Full code  - Will admit to ABHILASH Perrin PA-C     [id number]

## 2020-06-30 NOTE — CHART NOTE - NSCHARTNOTEFT_GEN_A_CORE
SURGERY POST-OP NOTE:    S: Patient underwent and tolerated procedure without issue and sent to PACU then SICU. Patient denies chest pain, shortness of breath, nausea, vomiting, lightheadedness. Pain was well controlled.  reports some dizziness    O:  T(C): 36.1 (06-30-20 @ 23:00), Max: 36.3 (06-30-20 @ 19:10)  HR: 91 (07-01-20 @ 00:00) (79 - 104)  BP: 112/55 (07-01-20 @ 00:00) (110/59 - 136/70)  RR: 22 (07-01-20 @ 00:00) (10 - 22)  SpO2: 100% (07-01-20 @ 00:00) (97% - 100%)  Wt(kg): --                        9.5    13.39 )-----------( 226      ( 30 Jun 2020 20:05 )             29.9        06-30    140  |  106  |  14  ----------------------------<  195<H>  4.1   |  20<L>  |  0.76    Ca    7.8<L>      30 Jun 2020 20:05  Phos  4.8     06-30  Mg     1.7     06-30    Physical exam  Gen: NAD, resting in bed, alert and responding appropriately  Resp: Non-labored respirations; on NC  chest: b/l breast flaps healthy, warm; axilla soft, no hematoma  Abd: Soft, appropriately tender, nondistended; incision c/d/i; DAMIR b/l x6 total ss  : capone in place  Ext: warm and well perfused    Assessment/Plan:  60y Female now 4 hours s/p Mastectomy with axillary node dissection  Bilateral mastectomy with sentinel lymph node biopsy  Simple mastectomy  Vascularized lymph node transfer  МАРИНА flap, free    Plan:  - Pain control  - DVT ppx  - Incentive spirometry  - care per primary team

## 2020-06-30 NOTE — BRIEF OPERATIVE NOTE - NSICDXBRIEFPOSTOP_GEN_ALL_CORE_FT
POST-OP DIAGNOSIS:  Malignant neoplasm of left breast 30-Jun-2020 14:18:07  Do Yo
POST-OP DIAGNOSIS:  Malignant neoplasm of left breast 30-Jun-2020 14:18:07  Do Yo

## 2020-07-01 LAB
ANION GAP SERPL CALC-SCNC: 16 MMOL/L — SIGNIFICANT CHANGE UP (ref 5–17)
APTT BLD: 24.6 SEC — LOW (ref 27.5–35.5)
BUN SERPL-MCNC: 17 MG/DL — SIGNIFICANT CHANGE UP (ref 7–23)
CALCIUM SERPL-MCNC: 7.9 MG/DL — LOW (ref 8.4–10.5)
CHLORIDE SERPL-SCNC: 106 MMOL/L — SIGNIFICANT CHANGE UP (ref 96–108)
CO2 SERPL-SCNC: 20 MMOL/L — LOW (ref 22–31)
CREAT SERPL-MCNC: 0.9 MG/DL — SIGNIFICANT CHANGE UP (ref 0.5–1.3)
GAS PNL BLDV: SIGNIFICANT CHANGE UP
GLUCOSE SERPL-MCNC: 103 MG/DL — HIGH (ref 70–99)
HCT VFR BLD CALC: 29.6 % — LOW (ref 34.5–45)
HCT VFR BLD CALC: 31 % — LOW (ref 34.5–45)
HGB BLD-MCNC: 9.2 G/DL — LOW (ref 11.5–15.5)
HGB BLD-MCNC: 9.4 G/DL — LOW (ref 11.5–15.5)
INR BLD: 1.23 RATIO — HIGH (ref 0.88–1.16)
MAGNESIUM SERPL-MCNC: 1.9 MG/DL — SIGNIFICANT CHANGE UP (ref 1.6–2.6)
MCHC RBC-ENTMCNC: 26.3 PG — LOW (ref 27–34)
MCHC RBC-ENTMCNC: 26.4 PG — LOW (ref 27–34)
MCHC RBC-ENTMCNC: 30.3 GM/DL — LOW (ref 32–36)
MCHC RBC-ENTMCNC: 31.1 GM/DL — LOW (ref 32–36)
MCV RBC AUTO: 85.1 FL — SIGNIFICANT CHANGE UP (ref 80–100)
MCV RBC AUTO: 86.6 FL — SIGNIFICANT CHANGE UP (ref 80–100)
NRBC # BLD: 0 /100 WBCS — SIGNIFICANT CHANGE UP (ref 0–0)
NRBC # BLD: 0 /100 WBCS — SIGNIFICANT CHANGE UP (ref 0–0)
PHOSPHATE SERPL-MCNC: 4.4 MG/DL — SIGNIFICANT CHANGE UP (ref 2.5–4.5)
PLATELET # BLD AUTO: 198 K/UL — SIGNIFICANT CHANGE UP (ref 150–400)
PLATELET # BLD AUTO: 207 K/UL — SIGNIFICANT CHANGE UP (ref 150–400)
POTASSIUM SERPL-MCNC: 4.8 MMOL/L — SIGNIFICANT CHANGE UP (ref 3.5–5.3)
POTASSIUM SERPL-SCNC: 4.8 MMOL/L — SIGNIFICANT CHANGE UP (ref 3.5–5.3)
PROTHROM AB SERPL-ACNC: 13.9 SEC — HIGH (ref 10.6–13.6)
RBC # BLD: 3.48 M/UL — LOW (ref 3.8–5.2)
RBC # BLD: 3.58 M/UL — LOW (ref 3.8–5.2)
RBC # FLD: 13.1 % — SIGNIFICANT CHANGE UP (ref 10.3–14.5)
RBC # FLD: 13.3 % — SIGNIFICANT CHANGE UP (ref 10.3–14.5)
SODIUM SERPL-SCNC: 142 MMOL/L — SIGNIFICANT CHANGE UP (ref 135–145)
WBC # BLD: 10.78 K/UL — HIGH (ref 3.8–10.5)
WBC # BLD: 13.65 K/UL — HIGH (ref 3.8–10.5)
WBC # FLD AUTO: 10.78 K/UL — HIGH (ref 3.8–10.5)
WBC # FLD AUTO: 13.65 K/UL — HIGH (ref 3.8–10.5)

## 2020-07-01 PROCEDURE — 99232 SBSQ HOSP IP/OBS MODERATE 35: CPT | Mod: GC

## 2020-07-01 RX ORDER — SODIUM CHLORIDE 9 MG/ML
1000 INJECTION, SOLUTION INTRAVENOUS
Refills: 0 | Status: DISCONTINUED | OUTPATIENT
Start: 2020-07-01 | End: 2020-07-02

## 2020-07-01 RX ORDER — CALCIUM GLUCONATE 100 MG/ML
2 VIAL (ML) INTRAVENOUS ONCE
Refills: 0 | Status: COMPLETED | OUTPATIENT
Start: 2020-07-01 | End: 2020-07-01

## 2020-07-01 RX ORDER — SODIUM CHLORIDE 9 MG/ML
500 INJECTION, SOLUTION INTRAVENOUS ONCE
Refills: 0 | Status: COMPLETED | OUTPATIENT
Start: 2020-07-01 | End: 2020-07-01

## 2020-07-01 RX ADMIN — Medication 15 MILLIGRAM(S): at 21:56

## 2020-07-01 RX ADMIN — HEPARIN SODIUM 5000 UNIT(S): 5000 INJECTION INTRAVENOUS; SUBCUTANEOUS at 05:10

## 2020-07-01 RX ADMIN — Medication 400 MILLIGRAM(S): at 05:10

## 2020-07-01 RX ADMIN — Medication 200 GRAM(S): at 05:10

## 2020-07-01 RX ADMIN — HYDROMORPHONE HYDROCHLORIDE 0.5 MILLIGRAM(S): 2 INJECTION INTRAMUSCULAR; INTRAVENOUS; SUBCUTANEOUS at 10:53

## 2020-07-01 RX ADMIN — HEPARIN SODIUM 5000 UNIT(S): 5000 INJECTION INTRAVENOUS; SUBCUTANEOUS at 14:28

## 2020-07-01 RX ADMIN — Medication 400 MILLIGRAM(S): at 17:26

## 2020-07-01 RX ADMIN — SODIUM CHLORIDE 1000 MILLILITER(S): 9 INJECTION, SOLUTION INTRAVENOUS at 17:19

## 2020-07-01 RX ADMIN — Medication 15 MILLIGRAM(S): at 03:08

## 2020-07-01 RX ADMIN — Medication 400 MILLIGRAM(S): at 12:05

## 2020-07-01 RX ADMIN — Medication 100 MILLIGRAM(S): at 03:08

## 2020-07-01 RX ADMIN — POLYETHYLENE GLYCOL 3350 17 GRAM(S): 17 POWDER, FOR SOLUTION ORAL at 12:05

## 2020-07-01 RX ADMIN — SODIUM CHLORIDE 100 MILLILITER(S): 9 INJECTION, SOLUTION INTRAVENOUS at 03:08

## 2020-07-01 RX ADMIN — OXYCODONE HYDROCHLORIDE 5 MILLIGRAM(S): 5 TABLET ORAL at 22:22

## 2020-07-01 RX ADMIN — Medication 400 MILLIGRAM(S): at 00:11

## 2020-07-01 RX ADMIN — SODIUM CHLORIDE 500 MILLILITER(S): 9 INJECTION, SOLUTION INTRAVENOUS at 15:52

## 2020-07-01 RX ADMIN — SENNA PLUS 2 TABLET(S): 8.6 TABLET ORAL at 21:56

## 2020-07-01 RX ADMIN — HEPARIN SODIUM 5000 UNIT(S): 5000 INJECTION INTRAVENOUS; SUBCUTANEOUS at 21:57

## 2020-07-01 RX ADMIN — Medication 15 MILLIGRAM(S): at 14:28

## 2020-07-01 RX ADMIN — Medication 15 MILLIGRAM(S): at 08:00

## 2020-07-01 NOTE — PROGRESS NOTE ADULT - ATTENDING COMMENTS
Dr. Ramirez (Attending Physician)  Lethargic and hypotensive after dilaudid  Bilateral МАРИНА free flaps on Q2 hour neurochecks  JOSH offered CPAP  Acute Blood Loss Anemia stable this am, on sqh will discuss lovenox

## 2020-07-02 ENCOUNTER — TRANSCRIPTION ENCOUNTER (OUTPATIENT)
Age: 60
End: 2020-07-02

## 2020-07-02 LAB
ANION GAP SERPL CALC-SCNC: 9 MMOL/L — SIGNIFICANT CHANGE UP (ref 5–17)
BUN SERPL-MCNC: 20 MG/DL — SIGNIFICANT CHANGE UP (ref 7–23)
CALCIUM SERPL-MCNC: 8.2 MG/DL — LOW (ref 8.4–10.5)
CHLORIDE SERPL-SCNC: 104 MMOL/L — SIGNIFICANT CHANGE UP (ref 96–108)
CO2 SERPL-SCNC: 24 MMOL/L — SIGNIFICANT CHANGE UP (ref 22–31)
CREAT SERPL-MCNC: 0.93 MG/DL — SIGNIFICANT CHANGE UP (ref 0.5–1.3)
GLUCOSE SERPL-MCNC: 105 MG/DL — HIGH (ref 70–99)
HCT VFR BLD CALC: 25.7 % — LOW (ref 34.5–45)
HCT VFR BLD CALC: 26.2 % — LOW (ref 34.5–45)
HGB BLD-MCNC: 7.9 G/DL — LOW (ref 11.5–15.5)
HGB BLD-MCNC: 8.1 G/DL — LOW (ref 11.5–15.5)
MAGNESIUM SERPL-MCNC: 2 MG/DL — SIGNIFICANT CHANGE UP (ref 1.6–2.6)
MCHC RBC-ENTMCNC: 26.4 PG — LOW (ref 27–34)
MCHC RBC-ENTMCNC: 26.4 PG — LOW (ref 27–34)
MCHC RBC-ENTMCNC: 30.7 GM/DL — LOW (ref 32–36)
MCHC RBC-ENTMCNC: 30.9 GM/DL — LOW (ref 32–36)
MCV RBC AUTO: 85.3 FL — SIGNIFICANT CHANGE UP (ref 80–100)
MCV RBC AUTO: 86 FL — SIGNIFICANT CHANGE UP (ref 80–100)
NRBC # BLD: 0 /100 WBCS — SIGNIFICANT CHANGE UP (ref 0–0)
NRBC # BLD: 0 /100 WBCS — SIGNIFICANT CHANGE UP (ref 0–0)
PHOSPHATE SERPL-MCNC: 3.1 MG/DL — SIGNIFICANT CHANGE UP (ref 2.5–4.5)
PLATELET # BLD AUTO: 167 K/UL — SIGNIFICANT CHANGE UP (ref 150–400)
PLATELET # BLD AUTO: 193 K/UL — SIGNIFICANT CHANGE UP (ref 150–400)
POTASSIUM SERPL-MCNC: 4.1 MMOL/L — SIGNIFICANT CHANGE UP (ref 3.5–5.3)
POTASSIUM SERPL-SCNC: 4.1 MMOL/L — SIGNIFICANT CHANGE UP (ref 3.5–5.3)
RBC # BLD: 2.99 M/UL — LOW (ref 3.8–5.2)
RBC # BLD: 3.07 M/UL — LOW (ref 3.8–5.2)
RBC # FLD: 13.2 % — SIGNIFICANT CHANGE UP (ref 10.3–14.5)
RBC # FLD: 13.2 % — SIGNIFICANT CHANGE UP (ref 10.3–14.5)
SODIUM SERPL-SCNC: 137 MMOL/L — SIGNIFICANT CHANGE UP (ref 135–145)
WBC # BLD: 11.3 K/UL — HIGH (ref 3.8–10.5)
WBC # BLD: 11.72 K/UL — HIGH (ref 3.8–10.5)
WBC # FLD AUTO: 11.3 K/UL — HIGH (ref 3.8–10.5)
WBC # FLD AUTO: 11.72 K/UL — HIGH (ref 3.8–10.5)

## 2020-07-02 PROCEDURE — 99232 SBSQ HOSP IP/OBS MODERATE 35: CPT | Mod: GC

## 2020-07-02 RX ORDER — ACETAMINOPHEN 500 MG
975 TABLET ORAL EVERY 6 HOURS
Refills: 0 | Status: DISCONTINUED | OUTPATIENT
Start: 2020-07-02 | End: 2020-07-03

## 2020-07-02 RX ORDER — ACETAMINOPHEN 500 MG
2 TABLET ORAL
Qty: 42 | Refills: 0
Start: 2020-07-02 | End: 2020-07-08

## 2020-07-02 RX ORDER — ONDANSETRON 8 MG/1
1 TABLET, FILM COATED ORAL
Qty: 9 | Refills: 0
Start: 2020-07-02 | End: 2020-07-04

## 2020-07-02 RX ORDER — ONDANSETRON 8 MG/1
4 TABLET, FILM COATED ORAL ONCE
Refills: 0 | Status: COMPLETED | OUTPATIENT
Start: 2020-07-02 | End: 2020-07-02

## 2020-07-02 RX ORDER — OXYCODONE HYDROCHLORIDE 5 MG/1
1 TABLET ORAL
Qty: 16 | Refills: 0
Start: 2020-07-02 | End: 2020-07-05

## 2020-07-02 RX ORDER — SODIUM CHLORIDE 9 MG/ML
3 INJECTION INTRAMUSCULAR; INTRAVENOUS; SUBCUTANEOUS EVERY 8 HOURS
Refills: 0 | Status: DISCONTINUED | OUTPATIENT
Start: 2020-07-02 | End: 2020-07-03

## 2020-07-02 RX ORDER — IBUPROFEN 200 MG
1 TABLET ORAL
Qty: 21 | Refills: 0
Start: 2020-07-02 | End: 2020-07-08

## 2020-07-02 RX ADMIN — SODIUM CHLORIDE 3 MILLILITER(S): 9 INJECTION INTRAMUSCULAR; INTRAVENOUS; SUBCUTANEOUS at 21:10

## 2020-07-02 RX ADMIN — Medication 975 MILLIGRAM(S): at 18:03

## 2020-07-02 RX ADMIN — SODIUM CHLORIDE 75 MILLILITER(S): 9 INJECTION, SOLUTION INTRAVENOUS at 05:38

## 2020-07-02 RX ADMIN — OXYCODONE HYDROCHLORIDE 5 MILLIGRAM(S): 5 TABLET ORAL at 05:38

## 2020-07-02 RX ADMIN — Medication 15 MILLIGRAM(S): at 05:37

## 2020-07-02 RX ADMIN — Medication 975 MILLIGRAM(S): at 12:10

## 2020-07-02 RX ADMIN — Medication 15 MILLIGRAM(S): at 14:58

## 2020-07-02 RX ADMIN — POLYETHYLENE GLYCOL 3350 17 GRAM(S): 17 POWDER, FOR SOLUTION ORAL at 11:03

## 2020-07-02 RX ADMIN — HEPARIN SODIUM 5000 UNIT(S): 5000 INJECTION INTRAVENOUS; SUBCUTANEOUS at 05:37

## 2020-07-02 RX ADMIN — SENNA PLUS 2 TABLET(S): 8.6 TABLET ORAL at 21:01

## 2020-07-02 RX ADMIN — HEPARIN SODIUM 5000 UNIT(S): 5000 INJECTION INTRAVENOUS; SUBCUTANEOUS at 14:58

## 2020-07-02 RX ADMIN — OXYCODONE HYDROCHLORIDE 5 MILLIGRAM(S): 5 TABLET ORAL at 10:36

## 2020-07-02 RX ADMIN — Medication 15 MILLIGRAM(S): at 08:04

## 2020-07-02 RX ADMIN — HEPARIN SODIUM 5000 UNIT(S): 5000 INJECTION INTRAVENOUS; SUBCUTANEOUS at 21:01

## 2020-07-02 RX ADMIN — Medication 15 MILLIGRAM(S): at 20:56

## 2020-07-02 RX ADMIN — ONDANSETRON 4 MILLIGRAM(S): 8 TABLET, FILM COATED ORAL at 06:31

## 2020-07-02 NOTE — PHYSICAL THERAPY INITIAL EVALUATION ADULT - DISCHARGE DISPOSITION, PT EVAL
home with home PT for improved strength balance & endurance for activity, home safety assessment, return to baseline; recommend RW for ambulation at this time; supervision/assist from family as needed/home

## 2020-07-02 NOTE — PHYSICAL THERAPY INITIAL EVALUATION ADULT - PERTINENT HX OF CURRENT PROBLEM, REHAB EVAL
Pt is 60F admitted 6/30/20 PMHx JOSH, cervical CA s/p LEEP, s/p RENE & recurrent stage II invasive lobular carcinoma s/p left lumpectomy & left sentinel lymph node biopsy; presents for scheduled bilateral simple mastectomy, right sentinel lymph node biopsy, completion left axillary lymph node dissection, and bilateral МАРИНА free flaps w/ inguinal lymph node transfer from right abdomen to left breast & cutaneous nerve grafting from chest to flaps.

## 2020-07-02 NOTE — DISCHARGE NOTE NURSING/CASE MANAGEMENT/SOCIAL WORK - NSSCTYPOFSERV_GEN_ALL_CORE
Visiting Nurse Services for DAMIR Drain [facility]. A representative will contact you tomorrow 7/3 to schedule a time to come to your home.

## 2020-07-02 NOTE — DISCHARGE NOTE NURSING/CASE MANAGEMENT/SOCIAL WORK - NSSCNAMETXT_GEN_ALL_CORE
[facility] (formerly [facility])

## 2020-07-02 NOTE — PROGRESS NOTE ADULT - ATTENDING COMMENTS
Dr. Ramirez (Attending Physician)  More Anemic today will recheck cbc this afternoon, otherwise doing well, bp improved today

## 2020-07-02 NOTE — PHYSICAL THERAPY INITIAL EVALUATION ADULT - GENERAL OBSERVATIONS, REHAB EVAL
Pt received OOB sitting in chair, A&Ox4, following commands, pleasant & eager to participate, s/p bilateral mastectomy(6/30), +JPx6, +ICU monitoring.

## 2020-07-02 NOTE — PHYSICAL THERAPY INITIAL EVALUATION ADULT - ADDITIONAL COMMENTS
Pt resides in private home with spouse & son, pt reports that dtr will also be home to assist upon DC from hospital; about 4 steps to enter with handrail, one level within. PTA independent with mobility and ADL's, owns no DME, (+)working.

## 2020-07-02 NOTE — DISCHARGE NOTE PROVIDER - PROVIDER TOKENS
PROVIDER:[TOKEN:[[id number]],FOLLOWUP:[2 weeks]],PROVIDER:[TOKEN:[[id number]],FOLLOWUP:[1 week]]

## 2020-07-02 NOTE — DISCHARGE NOTE NURSING/CASE MANAGEMENT/SOCIAL WORK - PATIENT PORTAL LINK FT
You can access the FollowMyHealth Patient Portal offered by [facility] by registering at the following website: [email or web address]. By joining [facility] FollowMyHealth portal, you will also be able to view your health information using other applications (apps) compatible with our system.

## 2020-07-02 NOTE — DISCHARGE NOTE PROVIDER - NSDCFUADDINST_GEN_ALL_CORE_FT
Activity:  - Rest at home during the first few days after surgery.  - Walking is encouraged, but strenuous exercise is not allowed until 6 weeks after surgery.   - Avoid strenuous activity. Do not lift your arms above your head. Do not lift more than 5-10 pounds.    Sleep:  Sleep on your back for the first two weeks after surgery.    Showering:  - You may take sponge baths following discharge. Pat dry. We will instruct you to shower once you have drains removed from your breasts in the office.  - Do not take a bath or submerge yourself in water.  - You will have special adhesive glue or tape over the incisions. Do not take these off.    For the Breast:  You have just undergone a breast reconstruction with the МАРИНА flap. Your breast will likely have bruising and possibly some blistering on the skin, which is expected after a mastectomy. You have a small patch of skin on your breasts, which is a different color than the surrounding breast skin. This paddle of skin comes from the abdomen and is an indicator of how the flap is doing. It is important to check this skin paddle daily. The skin should remain the same color. If the color of the small patch changes (i.e blue, purple, pale), please call the office immediately.    For the Abdomen:  Your incision and belly button are covered in a special medical grade sealant, which will come off in the office, 2-3 weeks after surgery.    Drains:  Both the breast and abdomen will have drains. It is important to empty the drains twice daily and record the outputs. Please bring this sheet to your appointment after surgery. Based on the output, the drains will be removed 1 to 3 weeks after surgery. For the drains to be working appropriately, the bulbs need to be collapsed to create a light suction. The nurse in the hospital will review the drain care with you and your family prior to discharge home. It is best to safely secure the drains to your clothes with a safety pin.    In an effort to make you more comfortable with discharge home and to answer any questions you may have, these instructions are for you. However, you may call the office at at any time with additional questions or concerns.    Call the Office:  Do not hesitate to call the office with any concerns or questions. A doctor is available to answer your questions 24 hours a day.   Please notify us if:  1. You have increased swelling, pain, or color change in the breast.  2. One breast becomes suddenly significantly larger than the other breast.  3. You have a sudden increase in swelling of the abdomen.  4. You have redness develop around the incisions.  5. You have a fever greater than 101 F.  6. You develop sudden increase in pain.  7. You develop drainage, spreading redness or foul odor  8. You have any questions.

## 2020-07-02 NOTE — DISCHARGE NOTE PROVIDER - HOSPITAL COURSE
60 year old female with history of metastatic breast cancer, axillary adenopathy, carcinoma in situ of cervix, obesity, and sleep apnea (refuses CPAP) presented on 6/23/2020 for presurgical testing.  According to oncology noted, dated 3/13/20, she had a left axilla biopsy which showed metastatic mammary carcinoma.  Screening mammogram done on 12/7/2019 showed enlarged left axillary lymph node, for which ultrasound guided biopsy was recommended and done on 1/21/2020. Pathology showed metastatic mammary carcinoma with lobular and signet ring cell features that was ER > 90%, MN 0%, and Vbt7vmy negative. MRI of the breast done 2/13/2020 showed non-mass enhancement in the upper inner left breast measuring 5 cm in AP dimension and abnormal left axillary lymph node with biopsy clip. Also 0.2 cm focus enhancing in the right breast. She underwent MRI guided biopsy of the left breast done on 3/11/2020. She denied any palpable masses in the left breast.  She was scheduled for bilateral simple mastectomy; right breast sentinel lymph node biopsy; left breast axillary dissection; bilateral МАРИНА with nerve graft 6/30/20 with Dr. Tarsha Hall.        The surgery was performed without complications. The МАРИНА flaps were monitored postoperatively with Vioptix. Her hospital course was unremarkable.         On the day of discharge, patient was ambulating, tolerating PO, electrolytes repleted as necessary, performing ADLs as necessary, stable for discharge with appropriate outpatient care and follow-up.

## 2020-07-02 NOTE — DISCHARGE NOTE PROVIDER - CARE PROVIDER_API CALL
Heidy Hall  SURGERY  [address]  [place] [address]  Phone: [phone]  Fax: [phone]  Follow Up Time: 2 weeks    Samuel Luis  PLASTIC SURGERY  [address]  [place] [address]  Phone: [phone]  Fax: [phone]  Follow Up Time: 1 week

## 2020-07-02 NOTE — PHYSICAL THERAPY INITIAL EVALUATION ADULT - GAIT DEVIATIONS NOTED, PT EVAL
decreased velocity of limb motion/decreased step length/decreased melissa/increased time in double stance

## 2020-07-02 NOTE — PHYSICAL THERAPY INITIAL EVALUATION ADULT - PLANNED THERAPY INTERVENTIONS, PT EVAL
bed mobility training/transfer training/GOALS: Pt will negotiate 12 steps with unilateral handrail & step to pattern with independence in 4wks/gait training

## 2020-07-02 NOTE — DISCHARGE NOTE PROVIDER - NSDCCPTREATMENT_GEN_ALL_CORE_FT
PRINCIPAL PROCEDURE  Procedure: МАРИНА flap, free  Findings and Treatment:       SECONDARY PROCEDURE  Procedure: Mastectomy with axillary node dissection  Findings and Treatment: Left axillary lymph node dissection    Procedure: Vascularized lymph node transfer  Findings and Treatment:     Procedure: Bilateral mastectomy with sentinel lymph node biopsy  Findings and Treatment:

## 2020-07-02 NOTE — DISCHARGE NOTE PROVIDER - NSDCMRMEDTOKEN_GEN_ALL_CORE_FT
anastrozole 1 mg oral tablet: 1 tab(s) orally once a day anastrozole 1 mg oral tablet: 1 tab(s) orally once a day  ibuprofen 400 mg oral tablet: 1 tab(s) orally every 8 hours   oxyCODONE 5 mg oral tablet: 1 tab(s) orally every 6 hours MDD:4 tabs  Tylenol 325 mg oral tablet: 2 tab(s) orally every 8 hours anastrozole 1 mg oral tablet: 1 tab(s) orally once a day  ibuprofen 400 mg oral tablet: 1 tab(s) orally every 8 hours   oxyCODONE 5 mg oral tablet: 1 tab(s) orally every 6 hours MDD:4 tabs  Tylenol 325 mg oral tablet: 2 tab(s) orally every 8 hours   Zofran 4 mg oral tablet: 1 tab(s) orally 3 times a day

## 2020-07-03 VITALS
TEMPERATURE: 98 F | HEART RATE: 89 BPM | DIASTOLIC BLOOD PRESSURE: 76 MMHG | RESPIRATION RATE: 18 BRPM | OXYGEN SATURATION: 99 % | SYSTOLIC BLOOD PRESSURE: 120 MMHG

## 2020-07-03 LAB
ANION GAP SERPL CALC-SCNC: 12 MMOL/L — SIGNIFICANT CHANGE UP (ref 5–17)
BUN SERPL-MCNC: 18 MG/DL — SIGNIFICANT CHANGE UP (ref 7–23)
CALCIUM SERPL-MCNC: 8.3 MG/DL — LOW (ref 8.4–10.5)
CHLORIDE SERPL-SCNC: 103 MMOL/L — SIGNIFICANT CHANGE UP (ref 96–108)
CO2 SERPL-SCNC: 22 MMOL/L — SIGNIFICANT CHANGE UP (ref 22–31)
CREAT SERPL-MCNC: 0.75 MG/DL — SIGNIFICANT CHANGE UP (ref 0.5–1.3)
GLUCOSE SERPL-MCNC: 104 MG/DL — HIGH (ref 70–99)
HCT VFR BLD CALC: 23.8 % — LOW (ref 34.5–45)
HGB BLD-MCNC: 7.6 G/DL — LOW (ref 11.5–15.5)
MAGNESIUM SERPL-MCNC: 2 MG/DL — SIGNIFICANT CHANGE UP (ref 1.6–2.6)
MCHC RBC-ENTMCNC: 26.6 PG — LOW (ref 27–34)
MCHC RBC-ENTMCNC: 31.9 GM/DL — LOW (ref 32–36)
MCV RBC AUTO: 83.2 FL — SIGNIFICANT CHANGE UP (ref 80–100)
NRBC # BLD: 0 /100 WBCS — SIGNIFICANT CHANGE UP (ref 0–0)
PHOSPHATE SERPL-MCNC: 3 MG/DL — SIGNIFICANT CHANGE UP (ref 2.5–4.5)
PLATELET # BLD AUTO: 192 K/UL — SIGNIFICANT CHANGE UP (ref 150–400)
POTASSIUM SERPL-MCNC: 4 MMOL/L — SIGNIFICANT CHANGE UP (ref 3.5–5.3)
POTASSIUM SERPL-SCNC: 4 MMOL/L — SIGNIFICANT CHANGE UP (ref 3.5–5.3)
RBC # BLD: 2.86 M/UL — LOW (ref 3.8–5.2)
RBC # FLD: 13.1 % — SIGNIFICANT CHANGE UP (ref 10.3–14.5)
SODIUM SERPL-SCNC: 137 MMOL/L — SIGNIFICANT CHANGE UP (ref 135–145)
WBC # BLD: 12.03 K/UL — HIGH (ref 3.8–10.5)
WBC # FLD AUTO: 12.03 K/UL — HIGH (ref 3.8–10.5)

## 2020-07-03 PROCEDURE — 97162 PT EVAL MOD COMPLEX 30 MIN: CPT

## 2020-07-03 PROCEDURE — 97116 GAIT TRAINING THERAPY: CPT

## 2020-07-03 PROCEDURE — A9541: CPT

## 2020-07-03 PROCEDURE — 85730 THROMBOPLASTIN TIME PARTIAL: CPT

## 2020-07-03 PROCEDURE — P9045: CPT

## 2020-07-03 PROCEDURE — 82803 BLOOD GASES ANY COMBINATION: CPT

## 2020-07-03 PROCEDURE — 85610 PROTHROMBIN TIME: CPT

## 2020-07-03 PROCEDURE — 84295 ASSAY OF SERUM SODIUM: CPT

## 2020-07-03 PROCEDURE — 83605 ASSAY OF LACTIC ACID: CPT

## 2020-07-03 PROCEDURE — 80048 BASIC METABOLIC PNL TOTAL CA: CPT

## 2020-07-03 PROCEDURE — C1889: CPT

## 2020-07-03 PROCEDURE — 97110 THERAPEUTIC EXERCISES: CPT

## 2020-07-03 PROCEDURE — P9041: CPT

## 2020-07-03 PROCEDURE — 84100 ASSAY OF PHOSPHORUS: CPT

## 2020-07-03 PROCEDURE — 82947 ASSAY GLUCOSE BLOOD QUANT: CPT

## 2020-07-03 PROCEDURE — 82330 ASSAY OF CALCIUM: CPT

## 2020-07-03 PROCEDURE — 85014 HEMATOCRIT: CPT

## 2020-07-03 PROCEDURE — C1762: CPT

## 2020-07-03 PROCEDURE — 83735 ASSAY OF MAGNESIUM: CPT

## 2020-07-03 PROCEDURE — 84132 ASSAY OF SERUM POTASSIUM: CPT

## 2020-07-03 PROCEDURE — 82435 ASSAY OF BLOOD CHLORIDE: CPT

## 2020-07-03 PROCEDURE — 85027 COMPLETE CBC AUTOMATED: CPT

## 2020-07-03 PROCEDURE — C9399: CPT

## 2020-07-03 RX ADMIN — Medication 15 MILLIGRAM(S): at 03:30

## 2020-07-03 RX ADMIN — Medication 975 MILLIGRAM(S): at 00:41

## 2020-07-03 RX ADMIN — SODIUM CHLORIDE 3 MILLILITER(S): 9 INJECTION INTRAMUSCULAR; INTRAVENOUS; SUBCUTANEOUS at 05:10

## 2020-07-03 RX ADMIN — Medication 15 MILLIGRAM(S): at 14:33

## 2020-07-03 RX ADMIN — POLYETHYLENE GLYCOL 3350 17 GRAM(S): 17 POWDER, FOR SOLUTION ORAL at 12:38

## 2020-07-03 RX ADMIN — Medication 975 MILLIGRAM(S): at 12:38

## 2020-07-03 RX ADMIN — Medication 975 MILLIGRAM(S): at 05:12

## 2020-07-03 RX ADMIN — HEPARIN SODIUM 5000 UNIT(S): 5000 INJECTION INTRAVENOUS; SUBCUTANEOUS at 05:12

## 2020-07-03 RX ADMIN — Medication 15 MILLIGRAM(S): at 09:25

## 2020-07-03 RX ADMIN — HEPARIN SODIUM 5000 UNIT(S): 5000 INJECTION INTRAVENOUS; SUBCUTANEOUS at 14:33

## 2020-07-03 RX ADMIN — SODIUM CHLORIDE 3 MILLILITER(S): 9 INJECTION INTRAMUSCULAR; INTRAVENOUS; SUBCUTANEOUS at 14:29

## 2020-07-03 NOTE — PROGRESS NOTE ADULT - ASSESSMENT
61 y/o female s/p bilateral simple mastectomy, right sentinel lymph node biopsy, completion left axillary lymph node dissection, and bilateral МАРИНА free flaps w/ inguinal lymph node transfer from right abdomen to left breast & cutaneous nerve grafting from chest to flaps    PLAN:    Neuro: acute post-op pain  - Monitor mental status  - Multimodal pain control with IV acetaminophen, ketorolac, oxycodone, and Dilaudid    Resp: JOSH (refuses CPAP)  - Monitor pulse oximeter, will monitor closely as patient has history of JOSH but refused CPAP in the past, will offer CPAP again if needed  - Out of bed to chair, ambulate as tolerated, and incentive spirometry to prevent atelectasis    CV: no acute issues  - Monitor vital signs    GI: no acute issues  - Regular diet as tolerated  - Bowel regimen with senna & Miralax    Renal: no acute issues  - Monitor I&Os  - Monitor electrolytes and replete as necessary  - LR at 75 mL/hr due to episode of orthostasis yesterday but can IV lock today    Heme: acute blood loss anemia  - Monitor CBC and coags, HCT dropped from 38.4 pre-op to 29.9 post-operatively likely due to blood loss in the OR but has been stable post-operatively  - SQH for VTE prophylaxis    ID: no acute issues  - Monitor for clinical evidence of active infection    Endo: no acute issues  - Monitor glucose on BMP    Breast: s/p bilateral simple mastectomy, right sentinel lymph node biopsy, completion left axillary lymph node dissection, and bilateral МАРИНА free flaps w/ inguinal lymph node transfer from right abdomen to left breast & cutaneous nerve grafting from chest to flaps  - Follow-up pathology  - Currently monitoring МАРИНА flaps O2 saturation every 2 hour but will likely decrease frequency to every 4 hours today, will notify PRS if O2 saturation decreased by more than 10% from baseline (68% for left breast and 50% for right breast when patient was leaving the OR)  - Monitor drain outputs    Disposition:  - Full code  - Stable for transfer to floors if patient is flap checks every 4 hours    Mita Perrin PA-C     [id number]
Pt is a 60F s/p bilateral mastectomies, L AND, b/l DIEPs with inguinal node transfer to the left side, nerve grafting on 6/30, doing well    - transfer to floor  - no IVF, no labs  - regular diet  - ambulate as tolerated  - q4 checks for color, temp, cap refill  - monitor exam, vitals    Plastic Surgery [phone]
61 y/o female s/p bilateral simple mastectomy, right sentinel lymph node biopsy, completion left axillary lymph node dissection, and bilateral МАРИНА free flaps w/ inguinal lymph node transfer from right abdomen to left breast & cutaneous nerve grafting from chest to flaps    PLAN:    Neuro: acute post-op pain  - Monitor mental status  - Multimodal pain control with IV acetaminophen, ketorolac oxycodone, and Dilaudid    Resp: JOSH (refuses CPAP)  - Monitor pulse oximeter, will monitor closely as patient has history of JOSH but refused CPAP in the past, will offer CPAP again if needed  - Out of bed to chair, ambulate as tolerated, and incentive spirometry to prevent atelectasis    CV: no acute issues  - Monitor vital signs    GI: no acute issues  - NPO except medications but will advance diet today  - Bowel regimen with senna & Miralax    Renal: no acute issues  - Monitor I&Os  - Monitor electrolytes and replete as necessary  - LR at 100 mL/hr while NPO    Heme: acute blood loss anemia  - Monitor CBC and coags, HCT dropped from 38.4 pre-op to 29.9 post-operatively likely due to blood loss in the OR  - Lovenox for VTE prophylaxis    ID: no acute issues  - Monitor for clinical evidence of active infection  - Ancef for em-operative antibiotics    Endo: no acute issues  - Monitor glucose on BMP    Breast: s/p bilateral simple mastectomy, right sentinel lymph node biopsy, completion left axillary lymph node dissection, and bilateral МАРИНА free flaps w/ inguinal lymph node transfer from right abdomen to left breast & cutaneous nerve grafting from chest to flaps  - Follow-up pathology  - Monitor МАРИНА flaps O2 saturation every hour, will notify PRS if O2 saturation decreased by more than 10% from baseline (68% for left breast and 50% for right breast when patient was leaving the OR)  - Monitor drain outputs    Disposition:  - Full code  - Will admit to ABHILASH Perrin PA-C     [id number]
Assessment/Plan:  60 year old female with history of metastatic breast cancer, axillary adenopathy, carcinoma in situ of cervix, obesity, and sleep apnea (refuses CPAP)  s/p b/l mastectomy, L AND, R SLNBx and МАРИНА w/ PRS (6/30)    Plan:  - no IVs/BPs in LUE  - care per plastic surgery  - Pain control  - DVT ppx  - Incentive spirometry    Please have patient call Dr. Hall's office at the time of discharge to schedule and appointment 1-2 weeks after discharge.    BLUE SURGERY  [id number]
Pt is a 60F s/p bilateral mastectomies, L AND, b/l DIEPs with inguinal node transfer to the left side, nerve grafting on 6/30, doing well    - no IVF, no labs  - regular diet  - ambulate as tolerated  - q4 checks for color, temp, cap refill  - monitor exam, vitals  - ok for discharge    Plastic Surgery [phone]
Pt is a 60F s/p bilateral mastectomies, L AND, b/l DIEPs with inguinal node transfer to the left side, nerve grafting on 6/30, doing well    - transfer to floor  - regular diet, IVF at 75cc/hr  - please have patient in a chair by noon and ambulate by this afternoon  - Calderón out  - q2 checks on transfer to the floor  - monitor exam, vitals, Vioptix; no need for further labs    Plastic Surgery [phone]

## 2020-07-03 NOTE — PROGRESS NOTE ADULT - SUBJECTIVE AND OBJECTIVE BOX
NAD  Afebrile, VSS  Bilateral breast free flap viable, closures intact.  Abdomen flat, closure intact.  Drains serosang.  home today
NAD  Afebrile, VSS  ViOptix 67/73 bilaterally, removed  Bilateral breast free flap viable, closures intact.  Abdomen flat, closure intact.  Drains serosang.  likely home today
Plastic Surgery Progress Note    Subjective: seen on rounds, no issues overnight, pain controlled and recovering on the floor    Exam:    Neuro: Alert  GA: well-appearing  Pulm: breathing comfortably  Breasts/Abd: incisions c/d/i, drains with s/s output, soft, flaps well perfused     Vital Signs Last 24 Hrs  T(C): 37.2 (03 Jul 2020 05:57), Max: 37.9 (02 Jul 2020 20:58)  T(F): 98.9 (03 Jul 2020 05:57), Max: 100.3 (02 Jul 2020 20:58)  HR: 85 (03 Jul 2020 05:57) (80 - 97)  BP: 124/77 (03 Jul 2020 05:57) (102/53 - 132/87)  BP(mean): 76 (02 Jul 2020 16:20) (73 - 76)  RR: 18 (03 Jul 2020 05:57) (16 - 30)  SpO2: 98% (03 Jul 2020 05:57) (89% - 100%)    I&O's Detail    02 Jul 2020 07:01  -  03 Jul 2020 07:00  --------------------------------------------------------  IN:  Total IN: 0 mL    OUT:    Drain: 45 mL    Drain: 30 mL    Drain: 155 mL    Drain: 25 mL    Drain: 105 mL    Drain: 165 mL    Voided: 750 mL  Total OUT: 1275 mL    Total NET: -1275 mL      MEDICATIONS  (STANDING):  acetaminophen   Tablet .. 975 milliGRAM(s) Oral every 6 hours  heparin   Injectable 5000 Unit(s) SubCutaneous every 8 hours  ketorolac   Injectable 15 milliGRAM(s) IV Push every 6 hours  polyethylene glycol 3350 17 Gram(s) Oral daily  senna 2 Tablet(s) Oral at bedtime  sodium chloride 0.9% lock flush 3 milliLiter(s) IV Push every 8 hours    MEDICATIONS  (PRN):  oxyCODONE    IR 5 milliGRAM(s) Oral every 4 hours PRN Moderate Pain (4 - 6)  oxyCODONE    IR 10 milliGRAM(s) Oral every 6 hours PRN Severe Pain (7 - 10)      LABS:                        8.1    11.30 )-----------( 193      ( 02 Jul 2020 10:04 )             26.2     07-02    137  |  104  |  20  ----------------------------<  105<H>  4.1   |  24  |  0.93    Ca    8.2<L>      02 Jul 2020 01:45  Phos  3.1     07-02  Mg     2.0     07-02
GENERAL SURGERY PROGRESS NOTE    SUBJECTIVE  Patient seen and examined. admitted to SICU for monitoring (PACU closed overnight)    c/o new numbness in R breast compared to L breast, RN to notify PRS this morning  s/p b/l mastectomy L AND and R SLNBx, and МАРИНА w/ PRS      OBJECTIVE    PHYSICAL EXAM  General: Appears well, NAD  Chest: МАРИНА DAMIR x 4 SS, b/l axillae soft nontender and no collections appreciated on exam  Abdomen: SNTND, incision c/d/i DAMIR x 2 SS  Ext: radial 2+ bilaterally, sensation/motor grossly equal and intact BUE, b/l finger squeeze 5/5     T(C): 36.5 (07-01-20 @ 07:00), Max: 36.5 (07-01-20 @ 07:00)  HR: 94 (07-01-20 @ 08:00) (79 - 104)  BP: 99/58 (07-01-20 @ 08:00) (94/50 - 136/70)  RR: 19 (07-01-20 @ 08:00) (10 - 22)  SpO2: 99% (07-01-20 @ 08:00) (95% - 100%)    06-30-20 @ 07:01  -  07-01-20 @ 07:00  --------------------------------------------------------  IN: 1550 mL / OUT: 905 mL / NET: 645 mL        MEDICATIONS  acetaminophen  IVPB .. 1000 milliGRAM(s) IV Intermittent every 6 hours  heparin   Injectable 5000 Unit(s) SubCutaneous every 8 hours  HYDROmorphone  Injectable 0.5 milliGRAM(s) IV Push every 6 hours PRN  ketorolac   Injectable 15 milliGRAM(s) IV Push every 6 hours  lactated ringers. 1000 milliLiter(s) IV Continuous <Continuous>  oxyCODONE    IR 5 milliGRAM(s) Oral every 4 hours PRN  oxyCODONE    IR 10 milliGRAM(s) Oral every 6 hours PRN  polyethylene glycol 3350 17 Gram(s) Oral daily  senna 2 Tablet(s) Oral at bedtime      LABS                        9.4    10.78 )-----------( 207      ( 01 Jul 2020 08:00 )             31.0     07-01    142  |  106  |  17  ----------------------------<  103<H>  4.8   |  20<L>  |  0.90    Ca    7.9<L>      01 Jul 2020 08:00  Phos  4.4     07-01  Mg     1.9     07-01      PT/INR - ( 01 Jul 2020 08:00 )   PT: 13.9 sec;   INR: 1.23 ratio         PTT - ( 01 Jul 2020 08:00 )  PTT:24.6 sec      RADIOLOGY & ADDITIONAL STUDIES
HISTORY:  59 y/o female with a PMHx of JOSH (patient refuses to use CPAP), cervical CA s/p LEEP, s/p RENE, and recurrent stage II invasive lobular carcinoma s/p left lumpectomy & left sentinel lymph node biopsy (positive in 5 out of 7 nodes) back in May 2020 who presented today for a scheduled bilateral simple mastectomy, right sentinel lymph node biopsy, completion left axillary lymph node dissection, and bilateral МАРИНА free flaps w/ inguinal lymph node transfer from right abdomen to left breast & cutaneous nerve grafting from chest to flaps. Case was uneventful and patient was extubated & hemodynamically stable at the end of the case. She received 5 L of crystalloid and 500 mL of 5% albumin. UOP was 1000 mL and EBL was 300 mL. SICU consulted for frequent flap checks.    24 HOUR EVENTS: Lactate cleared overnight from 2.9 to 1.7    SUBJECTIVE/ROS:  [x] A ten-point review of systems was otherwise negative except as noted.  [ ] Due to altered mental status/intubation, subjective information were not able to be obtained from the patient. History was obtained, to the extent possible, from review of the chart and collateral sources of information.    NEURO  Exam: awake, alert, oriented x4, no acute distress, no focal deficits  Meds:  - acetaminophen  IVPB .. 1000 milliGRAM(s) IV Intermittent every 6 hours  - HYDROmorphone  Injectable 0.5 milliGRAM(s) IV Push every 6 hours PRN Breakthrough Pain  - ketorolac   Injectable 15 milliGRAM(s) IV Push every 6 hours  - oxyCODONE    IR 5 milliGRAM(s) Oral every 4 hours PRN Moderate Pain (4 - 6)  - oxyCODONE    IR 10 milliGRAM(s) Oral every 6 hours PRN Severe Pain (7 - 10)  [x] Adequacy of sedation and pain control has been assessed and adjusted    RESPIRATORY  RR: 14 (07-01-20 @ 04:00) (10 - 22)  SpO2: 95% (07-01-20 @ 04:00) (95% - 100%)  Exam: clear to auscultation bilaterally, bilateral mastectomies incisions C/D/I, Vioptix w/ 68% O2 sat on left breast and 50% O2 sat on right breast, left surgical drains x2 and right surgical drains x2 with serosanguineous output  Heart - regular rate and rhythm, S1S2  Mechanical Ventilation: no  [N/A] Extubation Readiness Assessed  Meds: none    CARDIOVASCULAR  HR: 85 (07-01-20 @ 04:00) (62 - 104)  BP: 94/50 (07-01-20 @ 04:00) (94/50 - 136/70)  BP(mean): 65 (07-01-20 @ 04:00) (65 - 97)  VBG - ( 01 Jul 2020 03:44 )  pH: 7.36  /  pCO2: 46    /  pO2: 41    / HCO3: 25    / Base Excess: 0.4   /  SaO2: 74   /    Lactate: 1.7    Exam: regular rate and rhythm, S1S2  Cardiac Rhythm: sinus  Perfusion    [x]Adequate    [ ]Inadequate  Mentation   [x]Normal       [ ]Reduced  Extremities  [x]Warm         [ ]Cool  Volume Status [ ]Hypervolemic [x]Euvolemic [ ]Hypovolemic  Meds: none    GI/NUTRITION  Exam: soft, nondistended, abdominal surgical incision dressing C/D/I with minimal strikethrough, bilateral abdominal drains with serosanguineous output  Diet: NPO except medications  Meds:  - polyethylene glycol 3350 17 Gram(s) Oral daily  - senna 2 Tablet(s) Oral at bedtime    GENITOURINARY  I&O's Detail    06-30 @ 07:01  -  07-01 @ 05:10  --------------------------------------------------------  IN:    IV PiggyBack: 150 mL    lactated ringers.: 900 mL  Total IN: 1050 mL    OUT:    Drain: 35 mL    Drain: 60 mL    Drain: 90 mL    Drain: 20 mL    Drain: 60 mL    Drain: 50 mL    Indwelling Catheter - Urethral: 415 mL  Total OUT: 730 mL    Total NET: 320 mL    140  |  106  |  14  ----------------------------<  195<H>  4.1   |  20<L>  |  0.76    Ca    7.8<L>      30 Jun 2020 20:05  Phos  4.8  Mg     1.7    [ ] Calderón catheter, indication: urine output monitoring in the critically ill  Meds: lactated ringers infuse at 100 mL/hr    HEMATOLOGIC  Meds: heparin   Injectable 5000 Unit(s) SubCutaneous every 8 hours  [x] VTE Prophylaxis                        9.5    13.39 )-----------( 226      ( 30 Jun 2020 20:05 )             29.9     PT/INR - ( 30 Jun 2020 20:05 )   PT: 14.4 sec;   INR: 1.27 ratio    PTT - ( 30 Jun 2020 20:05 )  PTT:25.0 sec    INFECTIOUS DISEASES  T(C): 36.3 (07-01-20 @ 03:00), Max: 36.6 (06-30-20 @ 06:59)  WBC Count: 13.39 K/uL (06-30 @ 20:05)  Recent Cultures: none  Meds: none    ENDOCRINE  Capillary Blood Glucose: none  Meds: none    ACCESS DEVICES:  [x] Peripheral IV  [ ] Central Venous Line	[ ] R	[ ] L	[ ] IJ	[ ] Fem	[ ] SC	Placed:   [ ] Arterial Line		[ ] R	[ ] L	[ ] Fem	[ ] Rad	[ ] Ax	Placed:   [ ] PICC:					[ ] Mediport  [x] Urinary Catheter, Date Placed: 6/30  [x] Necessity of urinary, arterial, and venous catheters discussed    OTHER MEDICATIONS:    CODE STATUS: Full code    IMAGING:
HISTORY:  59 y/o female with a PMHx of OJSH (patient refuses to use CPAP), cervical CA s/p LEEP, s/p RENE, and recurrent stage II invasive lobular carcinoma s/p left lumpectomy & left sentinel lymph node biopsy (positive in 5 out of 7 nodes) back in May 2020 who presented today for a scheduled bilateral simple mastectomy, right sentinel lymph node biopsy, completion left axillary lymph node dissection, and bilateral МАРИНА free flaps w/ inguinal lymph node transfer from right abdomen to left breast & cutaneous nerve grafting from chest to flaps. Case was uneventful and patient was extubated & hemodynamically stable at the end of the case. She was admitted to SICU for frequent flap checks. Patient reports incisional pain in her breasts & abdomen but otherwise no headache, dizziness, weakness, shortness of breath, chest pain, nausea/vomiting, numbness, or tingling    24 HOUR EVENTS:  - Episode of orthostasis while getting out of bed to chair, improved with a 1 L bolus of LR  - Advanced to regular diet but kept on IV fluids due to the episode of orthostasis  - Calderón discontinued and passed trial of void  - HCT stable 29.9 -> 31.0 -> 29.6
Plastic Surgery Progress Note    Subjective: seen on rounds, no issues overnight, pain controlled and Vioptix stable    Exam:    Neuro: Alert  GA: well-appearing  Pulm: breathing comfortably  Breasts/Abd: incisions c/d/i, drains with s/s output, soft, flaps well perfused and Vioptix stable    Vital Signs Last 24 Hrs  T(C): 36.3 (01 Jul 2020 03:00), Max: 36.3 (30 Jun 2020 19:10)  T(F): 97.3 (01 Jul 2020 03:00), Max: 97.3 (30 Jun 2020 19:10)  HR: 85 (01 Jul 2020 07:00) (79 - 104)  BP: 99/55 (01 Jul 2020 07:00) (94/50 - 136/70)  BP(mean): 72 (01 Jul 2020 07:00) (65 - 97)  RR: 20 (01 Jul 2020 07:00) (10 - 22)  SpO2: 96% (01 Jul 2020 07:00) (95% - 100%)    I&O's Detail    30 Jun 2020 07:01  -  01 Jul 2020 07:00  --------------------------------------------------------  IN:    IV PiggyBack: 350 mL    lactated ringers.: 1200 mL  Total IN: 1550 mL    OUT:    Drain: 90 mL    Drain: 20 mL    Drain: 35 mL    Drain: 60 mL    Drain: 50 mL    Drain: 60 mL    Indwelling Catheter - Urethral: 590 mL  Total OUT: 905 mL    Total NET: 645 mL      MEDICATIONS  (STANDING):  acetaminophen  IVPB .. 1000 milliGRAM(s) IV Intermittent every 6 hours  heparin   Injectable 5000 Unit(s) SubCutaneous every 8 hours  ketorolac   Injectable 15 milliGRAM(s) IV Push every 6 hours  lactated ringers. 1000 milliLiter(s) (100 mL/Hr) IV Continuous <Continuous>  polyethylene glycol 3350 17 Gram(s) Oral daily  senna 2 Tablet(s) Oral at bedtime    MEDICATIONS  (PRN):  HYDROmorphone  Injectable 0.5 milliGRAM(s) IV Push every 6 hours PRN Breakthrough Pain  oxyCODONE    IR 5 milliGRAM(s) Oral every 4 hours PRN Moderate Pain (4 - 6)  oxyCODONE    IR 10 milliGRAM(s) Oral every 6 hours PRN Severe Pain (7 - 10)      LABS:                        9.5    13.39 )-----------( 226      ( 30 Jun 2020 20:05 )             29.9     06-30    140  |  106  |  14  ----------------------------<  195<H>  4.1   |  20<L>  |  0.76    Ca    7.8<L>      30 Jun 2020 20:05  Phos  4.8     06-30  Mg     1.7     06-30      PT/INR - ( 30 Jun 2020 20:05 )   PT: 14.4 sec;   INR: 1.27 ratio         PTT - ( 30 Jun 2020 20:05 )  PTT:25.0 sec
Plastic Surgery Progress Note    Subjective: seen on rounds, no issues overnight, pain controlled and Vioptix stable, since removed    Exam:    Neuro: Alert  GA: well-appearing  Pulm: breathing comfortably  Breasts/Abd: incisions c/d/i, drains with s/s output, soft, flaps well perfused and Vioptix stable    Vital Signs Last 24 Hrs  T(C): 36.6 (02 Jul 2020 07:00), Max: 36.9 (02 Jul 2020 03:00)  T(F): 97.9 (02 Jul 2020 07:00), Max: 98.4 (02 Jul 2020 03:00)  HR: 85 (02 Jul 2020 07:00) (76 - 101)  BP: 116/58 (02 Jul 2020 07:00) (81/47 - 145/65)  BP(mean): 79 (02 Jul 2020 07:00) (59 - 94)  RR: 16 (02 Jul 2020 07:00) (13 - 34)  SpO2: 95% (02 Jul 2020 07:00) (90% - 100%)    I&O's Detail    01 Jul 2020 07:01  -  02 Jul 2020 07:00  --------------------------------------------------------  IN:    IV PiggyBack: 200 mL    Lactated Ringers IV Bolus: 1000 mL    lactated ringers.: 300 mL    lactated ringers.: 1500 mL  Total IN: 3000 mL    OUT:    Drain: 145 mL    Drain: 60 mL    Drain: 220 mL    Drain: 30 mL    Drain: 55 mL    Drain: 95 mL    Indwelling Catheter - Urethral: 105 mL    Voided: 1050 mL  Total OUT: 1760 mL    Total NET: 1240 mL      MEDICATIONS  (STANDING):  heparin   Injectable 5000 Unit(s) SubCutaneous every 8 hours  ketorolac   Injectable 15 milliGRAM(s) IV Push every 6 hours  polyethylene glycol 3350 17 Gram(s) Oral daily  senna 2 Tablet(s) Oral at bedtime  sodium chloride 0.9% lock flush 3 milliLiter(s) IV Push every 8 hours    MEDICATIONS  (PRN):  oxyCODONE    IR 5 milliGRAM(s) Oral every 4 hours PRN Moderate Pain (4 - 6)  oxyCODONE    IR 10 milliGRAM(s) Oral every 6 hours PRN Severe Pain (7 - 10)      LABS:                        7.9    11.72 )-----------( 167      ( 02 Jul 2020 01:45 )             25.7     07-02    137  |  104  |  20  ----------------------------<  105<H>  4.1   |  24  |  0.93    Ca    8.2<L>      02 Jul 2020 01:45  Phos  3.1     07-02  Mg     2.0     07-02      PT/INR - ( 01 Jul 2020 08:00 )   PT: 13.9 sec;   INR: 1.23 ratio         PTT - ( 01 Jul 2020 08:00 )  PTT:24.6 sec

## 2020-07-09 LAB — SURGICAL PATHOLOGY STUDY: SIGNIFICANT CHANGE UP

## 2020-07-31 ENCOUNTER — OUTPATIENT (OUTPATIENT)
Dept: OUTPATIENT SERVICES | Facility: HOSPITAL | Age: 60
LOS: 1 days | Discharge: HOME | End: 2020-07-31

## 2020-07-31 DIAGNOSIS — Z98.890 OTHER SPECIFIED POSTPROCEDURAL STATES: Chronic | ICD-10-CM

## 2020-07-31 DIAGNOSIS — Z90.710 ACQUIRED ABSENCE OF BOTH CERVIX AND UTERUS: Chronic | ICD-10-CM

## 2020-07-31 DIAGNOSIS — Z11.1 ENCOUNTER FOR SCREENING FOR RESPIRATORY TUBERCULOSIS: ICD-10-CM

## 2020-08-05 ENCOUNTER — OUTPATIENT (OUTPATIENT)
Dept: OUTPATIENT SERVICES | Facility: HOSPITAL | Age: 60
LOS: 1 days | End: 2020-08-05
Payer: COMMERCIAL

## 2020-08-05 VITALS
HEIGHT: 62 IN | OXYGEN SATURATION: 98 % | SYSTOLIC BLOOD PRESSURE: 110 MMHG | RESPIRATION RATE: 16 BRPM | HEART RATE: 73 BPM | TEMPERATURE: 98 F | WEIGHT: 169.98 LBS | DIASTOLIC BLOOD PRESSURE: 77 MMHG

## 2020-08-05 DIAGNOSIS — Z98.890 OTHER SPECIFIED POSTPROCEDURAL STATES: Chronic | ICD-10-CM

## 2020-08-05 DIAGNOSIS — Z90.13 ACQUIRED ABSENCE OF BILATERAL BREASTS AND NIPPLES: Chronic | ICD-10-CM

## 2020-08-05 DIAGNOSIS — T14.8XXA OTHER INJURY OF UNSPECIFIED BODY REGION, INITIAL ENCOUNTER: ICD-10-CM

## 2020-08-05 DIAGNOSIS — Z01.818 ENCOUNTER FOR OTHER PREPROCEDURAL EXAMINATION: ICD-10-CM

## 2020-08-05 DIAGNOSIS — Z90.710 ACQUIRED ABSENCE OF BOTH CERVIX AND UTERUS: Chronic | ICD-10-CM

## 2020-08-05 DIAGNOSIS — D05.02 LOBULAR CARCINOMA IN SITU OF LEFT BREAST: ICD-10-CM

## 2020-08-05 PROCEDURE — 80048 BASIC METABOLIC PNL TOTAL CA: CPT

## 2020-08-05 PROCEDURE — G0463: CPT

## 2020-08-05 PROCEDURE — 85027 COMPLETE CBC AUTOMATED: CPT

## 2020-08-05 RX ORDER — SODIUM CHLORIDE 9 MG/ML
3 INJECTION INTRAMUSCULAR; INTRAVENOUS; SUBCUTANEOUS EVERY 8 HOURS
Refills: 0 | Status: DISCONTINUED | OUTPATIENT
Start: 2020-08-12 | End: 2020-08-27

## 2020-08-05 RX ORDER — LIDOCAINE HCL 20 MG/ML
0.2 VIAL (ML) INJECTION ONCE
Refills: 0 | Status: DISCONTINUED | OUTPATIENT
Start: 2020-08-12 | End: 2020-08-27

## 2020-08-05 NOTE — H&P PST ADULT - NSICDXPASTMEDICALHX_GEN_ALL_CORE_FT
PAST MEDICAL HISTORY:  Axillary adenopathy     Cancer of left female breast with positive nodes    Cervical ca had + PAP "abnormal cells ? cancer  had hysterectomy 2015    Obesity     JOSH (obstructive sleep apnea) CPAP recommended, pt refused PAST MEDICAL HISTORY:  Axillary adenopathy     Cancer of left female breast with positive nodes    Cervical ca had + PAP "abnormal cells ? cancer  had hysterectomy 2015    Non-healing surgical wound right breast nipple    Obesity     JOSH (obstructive sleep apnea) CPAP recommended, pt refused

## 2020-08-05 NOTE — H&P PST ADULT - NSICDXPROBLEM_GEN_ALL_CORE_FT
PROBLEM DIAGNOSES  Problem: Non-healing non-surgical wound  Assessment and Plan: has appointment with Dr. 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

## 2020-08-05 NOTE — H&P PST ADULT - NSICDXPASTSURGICALHX_GEN_ALL_CORE_FT
10/25/2017OS-0.75+0.5015+2.5020/20&nbsp;SN &nbsp; J1+&nbsp;JG PAST SURGICAL HISTORY:  H/O breast biopsy open    S/P bilateral mastectomy 6/30/2020 reconstruction  МАРИНА flap    S/P hysterectomy 2015    S/P LEEP (loop electrosurgical excision procedure)     S/P lumpectomy, left breast May 2020

## 2020-08-05 NOTE — H&P PST ADULT - HISTORY OF PRESENT ILLNESS
60 year old female with newly diagnosed breast cancer  (positive nodes) had surgery   with reconstruction, Now for [facility].  Non healing right breast nipple surgical incision has appointment with Dr. Luis [phone] on 8/7. Obtain note.   COVID  denies travel  denies known exposure   denies s/s  given # instructed to make  appointment  with corporate sites for swab.

## 2020-08-05 NOTE — H&P PST ADULT - NSICDXFAMILYHX_GEN_ALL_CORE_FT
FAMILY HISTORY:  FH: Alzheimers disease, father  FH: pancreatic cancer, mother -   FH: thyroid disease, sister

## 2020-08-06 ENCOUNTER — APPOINTMENT (OUTPATIENT)
Dept: HEMATOLOGY ONCOLOGY | Facility: CLINIC | Age: 60
End: 2020-08-06
Payer: COMMERCIAL

## 2020-08-06 PROBLEM — T81.89XA OTHER COMPLICATIONS OF PROCEDURES, NOT ELSEWHERE CLASSIFIED, INITIAL ENCOUNTER: Chronic | Status: ACTIVE | Noted: 2020-08-05

## 2020-08-06 PROBLEM — C53.9 MALIGNANT NEOPLASM OF CERVIX UTERI, UNSPECIFIED: Chronic | Status: ACTIVE | Noted: 2020-08-05

## 2020-08-06 PROBLEM — C50.912 MALIGNANT NEOPLASM OF UNSPECIFIED SITE OF LEFT FEMALE BREAST: Chronic | Status: ACTIVE | Noted: 2020-08-05

## 2020-08-06 PROCEDURE — 99215 OFFICE O/P EST HI 40 MIN: CPT | Mod: 95

## 2020-08-06 NOTE — CONSULT LETTER
[Courtesy Letter:] : I had the pleasure of seeing your patient, [unfilled], in my office today. [Dear  ___] : Dear  [unfilled], [Please see my note below.] : Please see my note below. [Sincerely,] : Sincerely, [Consult Closing:] : Thank you very much for allowing me to participate in the care of this patient.  If you have any questions, please do not hesitate to contact me. [DrCoral  ___] : Dr. WALLACE [FreeTextEntry2] : Heidy Hall MD\par [address] \[place] [address] [FreeTextEntry3] : Richard Lucia MD\par Attending\par [facility]\par  [DrCoral ___] : Dr. WALLACE

## 2020-08-06 NOTE — REVIEW OF SYSTEMS
[Negative] : Allergic/Immunologic [Skin Wound] : skin wound [Skin Rash] : no skin rash [de-identified] : wound over МАРИНА flap site over the right reconstruction

## 2020-08-06 NOTE — REASON FOR VISIT
[Follow-Up Visit] : a follow-up [FreeTextEntry2] : follow up for breast cancer s/p bilateral mastectomy with additional positive lymph nodes found

## 2020-08-06 NOTE — PHYSICAL EXAM
[Restricted in physically strenuous activity but ambulatory and able to carry out work of a light or sedentary nature] : Status 1- Restricted in physically strenuous activity but ambulatory and able to carry out work of a light or sedentary nature, e.g., light house work, office work [Normal] : affect appropriate [de-identified] : right МАРИНА site with granulation tissue around nipple; left МАРИНА flap healed  [de-identified] : normal respiratory effort: able to speak in full sentences without gasping or wheezing

## 2020-08-06 NOTE — HISTORY OF PRESENT ILLNESS
[Disease: _____________________] : Disease: [unfilled] [Home] : at home, [unfilled] , at the time of the visit. [Medical Office: ([facility])___] : at the medical office located in  [Verbal consent obtained from patient] : the patient, [unfilled] [T: ___] : T[unfilled] [N: ___] : N[unfilled] [AJCC Stage: ____] : AJCC Stage: [unfilled] [de-identified] : Age 60: left axilla biopsy metastatic mammary carcinoma \par Screening mammogram done on 12/7/2019 which showed enlarged left axillary lymph node for which ultrasound guided biopsy was recommended and done on 1/21/2020. Pathology showed metastatic mammary carcinoma with lobular and signet ring cell features that is ER > 90%, AZ 0%, and [id number] negative. MRI of the breast done 2/13/2020 showed nonmass enhancement in the upper inner left breast measuring 5 cm in AP dimension and abnormal left axillary lymph node with biopsy clip. Also 0.2 cm focus enhancing in the right breast. She underwent MRI guided biopsy of the left breast done on 3/11/2020. We reviewed neoadjuvant chemotherapy which she refused and was on anastrozole while awaiting surgery which was delayed due to covid. She underwent left lumpectomy with sentinel lymph node followed by left axillary lymph node dissection with Dr Hall on 5/5/2020. The pathology showed invasive lobular carcinoma, classic type, Grade 2, invasive component was 2.3 cm and 5 out of 7 lymph nodes positive for carcinoma: T2N2 ER 90%, AZ 0%, [id number] negative. We performed CT of the chest/ abd/ pelvis along with bone scan 5/22/2020 which showed indeterminate liver lesion. She had MRI of the abdomen 6/22/2020 done which showed hemangioma. She underwent prophylactic nipple sparing prophylactic right mastectomy and left mastectomy with additional lymph node removal on 6/30/2020 with Dr Hall. The pathology showed fibrocystic changes in the right prophylactic mastectomy and negative sentinel lymph node. The left mastectomy showed LCIS along with 5 additional positive lymph nodes.  [de-identified] : Due to coronavirus pandemic, telehealth visit made to decrease patient exposure. She consented to telehealth visit. The right МАРИНА flap reconstruction is still healing and she will be seeing Plastic surgeon next week. She will also be having port placement on 8/12/2020. She took time off from work until September and not sure how much more time she can be off. She denies any new cough or SOB or bone pain. No swelling over the arms.  [de-identified] : mammary carcinoma ER > 90%, FL 0%, Qmi6fyp negative

## 2020-08-06 NOTE — ASSESSMENT
[FreeTextEntry1] : She is a 61 y/o AAF Stage III left invasive lobular carcinoma who was on anastrozole while awaiting surgery s/p lumpectomy with axillary lymph node dissection in 5/5/2020 followed by mastectomy 6/30/2020 with additional lymph nodes removed. We reviewed her pathology with the number of positive lymph nodes and the risk of breast cancer recurrence. We reviewed the role of adjuvant therapy to decrease risk of breast cancer recurrence. We reviewed dose dense ACT and potential side effects including but not limited to: fatigue, low blood counts, increased risk of infection, GI upset, nausea, constipation/ diarrhea, and hair loss. We reviewed her questions about duration and timing of AC versus taxol chemotherapy. We reviewed fatigue that may affect her ability for strenuous work. We reviewed timing of treatment once her reconstruction has healed and plastic surgeon clears for chemotherapy. Questions answered to her satisfaction. Will have interval follow up in 3 weeks.

## 2020-08-10 ENCOUNTER — APPOINTMENT (OUTPATIENT)
Dept: DISASTER EMERGENCY | Facility: CLINIC | Age: 60
End: 2020-08-10

## 2020-08-10 ENCOUNTER — APPOINTMENT (OUTPATIENT)
Dept: CARDIOLOGY | Facility: CLINIC | Age: 60
End: 2020-08-10
Payer: COMMERCIAL

## 2020-08-10 LAB — SARS-COV-2 N GENE NPH QL NAA+PROBE: NOT DETECTED

## 2020-08-10 PROCEDURE — 93306 TTE W/DOPPLER COMPLETE: CPT

## 2020-08-11 ENCOUNTER — TRANSCRIPTION ENCOUNTER (OUTPATIENT)
Age: 60
End: 2020-08-11

## 2020-08-11 RX ORDER — SODIUM CHLORIDE 9 MG/ML
1000 INJECTION, SOLUTION INTRAVENOUS
Refills: 0 | Status: DISCONTINUED | OUTPATIENT
Start: 2020-08-12 | End: 2020-08-27

## 2020-08-12 ENCOUNTER — OUTPATIENT (OUTPATIENT)
Dept: OUTPATIENT SERVICES | Facility: HOSPITAL | Age: 60
LOS: 1 days | End: 2020-08-12
Payer: COMMERCIAL

## 2020-08-12 VITALS
RESPIRATION RATE: 12 BRPM | OXYGEN SATURATION: 98 % | TEMPERATURE: 98 F | SYSTOLIC BLOOD PRESSURE: 92 MMHG | HEART RATE: 78 BPM | DIASTOLIC BLOOD PRESSURE: 52 MMHG

## 2020-08-12 VITALS
RESPIRATION RATE: 16 BRPM | TEMPERATURE: 97 F | DIASTOLIC BLOOD PRESSURE: 58 MMHG | OXYGEN SATURATION: 100 % | HEIGHT: 61 IN | SYSTOLIC BLOOD PRESSURE: 122 MMHG | WEIGHT: 171.96 LBS | HEART RATE: 62 BPM

## 2020-08-12 DIAGNOSIS — Z98.890 OTHER SPECIFIED POSTPROCEDURAL STATES: Chronic | ICD-10-CM

## 2020-08-12 DIAGNOSIS — Z90.710 ACQUIRED ABSENCE OF BOTH CERVIX AND UTERUS: Chronic | ICD-10-CM

## 2020-08-12 DIAGNOSIS — Z90.13 ACQUIRED ABSENCE OF BILATERAL BREASTS AND NIPPLES: Chronic | ICD-10-CM

## 2020-08-12 DIAGNOSIS — D05.02 LOBULAR CARCINOMA IN SITU OF LEFT BREAST: ICD-10-CM

## 2020-08-12 LAB
APTT BLD: 28.5 SEC — SIGNIFICANT CHANGE UP (ref 27.5–35.5)
INR BLD: 1.04 RATIO — SIGNIFICANT CHANGE UP (ref 0.88–1.16)
PROTHROM AB SERPL-ACNC: 12.4 SEC — SIGNIFICANT CHANGE UP (ref 10.6–13.6)

## 2020-08-12 PROCEDURE — 71045 X-RAY EXAM CHEST 1 VIEW: CPT | Mod: 26

## 2020-08-12 PROCEDURE — 76000 FLUOROSCOPY <1 HR PHYS/QHP: CPT

## 2020-08-12 PROCEDURE — C1892: CPT

## 2020-08-12 PROCEDURE — 36561 INSERT TUNNELED CV CATH: CPT

## 2020-08-12 PROCEDURE — 71045 X-RAY EXAM CHEST 1 VIEW: CPT

## 2020-08-12 PROCEDURE — 85730 THROMBOPLASTIN TIME PARTIAL: CPT

## 2020-08-12 PROCEDURE — 85610 PROTHROMBIN TIME: CPT

## 2020-08-12 PROCEDURE — C1788: CPT

## 2020-08-12 RX ORDER — CHLORHEXIDINE GLUCONATE 213 G/1000ML
1 SOLUTION TOPICAL ONCE
Refills: 0 | Status: COMPLETED | OUTPATIENT
Start: 2020-08-12 | End: 2020-08-12

## 2020-08-12 RX ADMIN — CHLORHEXIDINE GLUCONATE 1 APPLICATION(S): 213 SOLUTION TOPICAL at 09:38

## 2020-08-12 NOTE — ASU DISCHARGE PLAN (ADULT/PEDIATRIC) - CALL YOUR DOCTOR IF YOU HAVE ANY OF THE FOLLOWING:
Bleeding that does not stop/Wound/Surgical Site with redness, or foul smelling discharge or pus/Numbness, tingling, color or temperature change to extremity/Fever greater than (need to indicate Fahrenheit or Celsius)/Swelling that gets worse

## 2020-08-12 NOTE — ASU PATIENT PROFILE, ADULT - PMH
Axillary adenopathy    Cancer of left female breast  with positive nodes  Cervical ca  had + PAP "abnormal cells ? cancer  had hysterectomy 2015  Non-healing surgical wound  right breast nipple  Obesity    JOSH (obstructive sleep apnea)  CPAP recommended, pt refused

## 2020-08-12 NOTE — ASU PATIENT PROFILE, ADULT - PSH
H/O breast biopsy  open  S/P bilateral mastectomy  6/30/2020 reconstruction  МАРИНА flap  S/P hysterectomy  2015  S/P LEEP (loop electrosurgical excision procedure)    S/P lumpectomy, left breast  May 2020

## 2020-08-12 NOTE — ASU DISCHARGE PLAN (ADULT/PEDIATRIC) - NURSING INSTRUCTIONS
Next dose of Tylenol will be on or after __0430PM_________ ,today/tonight and every 6 hours afterwards for pain management, do not take any Tylenol containing products until this time. Your first dose of Tylenol was given at _1023AM__________. Do not exceed more than 4000mg of Tylenol in one 24 hour setting. If no contraindications, you may alternate with Ibuprofen or Naproxen 3 hours after dose of Tylenol. Ibuprofen can be taken every 6 hours. Naproxen may be taken every 12 hours.

## 2020-08-12 NOTE — ASU DISCHARGE PLAN (ADULT/PEDIATRIC) - CARE PROVIDER_API CALL
Heidy Hall  SURGERY  [address] [place] [address]  Phone: [phone]  Fax: [phone]  Follow Up Time:

## 2020-08-20 ENCOUNTER — RESULT REVIEW (OUTPATIENT)
Age: 60
End: 2020-08-20

## 2020-08-20 ENCOUNTER — APPOINTMENT (OUTPATIENT)
Dept: HEMATOLOGY ONCOLOGY | Facility: CLINIC | Age: 60
End: 2020-08-20
Payer: COMMERCIAL

## 2020-08-20 ENCOUNTER — APPOINTMENT (OUTPATIENT)
Dept: WOUND CARE | Facility: CLINIC | Age: 60
End: 2020-08-20

## 2020-08-20 VITALS
BODY MASS INDEX: 31.44 KG/M2 | WEIGHT: 170.86 LBS | TEMPERATURE: 98.2 F | OXYGEN SATURATION: 98 % | RESPIRATION RATE: 16 BRPM | DIASTOLIC BLOOD PRESSURE: 87 MMHG | HEIGHT: 62 IN | HEART RATE: 80 BPM | SYSTOLIC BLOOD PRESSURE: 130 MMHG

## 2020-08-20 LAB
BASOPHILS # BLD AUTO: 0.02 K/UL — SIGNIFICANT CHANGE UP (ref 0–0.2)
BASOPHILS NFR BLD AUTO: 0.3 % — SIGNIFICANT CHANGE UP (ref 0–2)
EOSINOPHIL # BLD AUTO: 0.12 K/UL — SIGNIFICANT CHANGE UP (ref 0–0.5)
EOSINOPHIL NFR BLD AUTO: 1.8 % — SIGNIFICANT CHANGE UP (ref 0–6)
HCT VFR BLD CALC: 34.8 % — SIGNIFICANT CHANGE UP (ref 34.5–45)
HGB BLD-MCNC: 10.6 G/DL — LOW (ref 11.5–15.5)
IMM GRANULOCYTES NFR BLD AUTO: 0.3 % — SIGNIFICANT CHANGE UP (ref 0–1.5)
LYMPHOCYTES # BLD AUTO: 1.89 K/UL — SIGNIFICANT CHANGE UP (ref 1–3.3)
LYMPHOCYTES # BLD AUTO: 28 % — SIGNIFICANT CHANGE UP (ref 13–44)
MCHC RBC-ENTMCNC: 25 PG — LOW (ref 27–34)
MCHC RBC-ENTMCNC: 30.5 GM/DL — LOW (ref 32–36)
MCV RBC AUTO: 82.1 FL — SIGNIFICANT CHANGE UP (ref 80–100)
MONOCYTES # BLD AUTO: 0.47 K/UL — SIGNIFICANT CHANGE UP (ref 0–0.9)
MONOCYTES NFR BLD AUTO: 7 % — SIGNIFICANT CHANGE UP (ref 2–14)
NEUTROPHILS # BLD AUTO: 4.24 K/UL — SIGNIFICANT CHANGE UP (ref 1.8–7.4)
NEUTROPHILS NFR BLD AUTO: 62.6 % — SIGNIFICANT CHANGE UP (ref 43–77)
NRBC # BLD: 0 /100 WBCS — SIGNIFICANT CHANGE UP (ref 0–0)
PLATELET # BLD AUTO: 288 K/UL — SIGNIFICANT CHANGE UP (ref 150–400)
RBC # BLD: 4.24 M/UL — SIGNIFICANT CHANGE UP (ref 3.8–5.2)
RBC # FLD: 13.3 % — SIGNIFICANT CHANGE UP (ref 10.3–14.5)
WBC # BLD: 6.76 K/UL — SIGNIFICANT CHANGE UP (ref 3.8–10.5)
WBC # FLD AUTO: 6.76 K/UL — SIGNIFICANT CHANGE UP (ref 3.8–10.5)

## 2020-08-20 PROCEDURE — 99215 OFFICE O/P EST HI 40 MIN: CPT

## 2020-08-20 NOTE — PHYSICAL EXAM
[Restricted in physically strenuous activity but ambulatory and able to carry out work of a light or sedentary nature] : Status 1- Restricted in physically strenuous activity but ambulatory and able to carry out work of a light or sedentary nature, e.g., light house work, office work [Normal] : affect appropriate [de-identified] : normal respiratory effort: able to speak in full sentences without gasping or wheezing  [de-identified] : B МАРИНА flap reconstruction with nipple site wound

## 2020-08-20 NOTE — REVIEW OF SYSTEMS
[Skin Rash] : no skin rash [Skin Wound] : skin wound [Negative] : Allergic/Immunologic [de-identified] : МАРИНА flap wound

## 2020-08-20 NOTE — HISTORY OF PRESENT ILLNESS
[Disease: _____________________] : Disease: [unfilled] [T: ___] : T[unfilled] [N: ___] : N[unfilled] [AJCC Stage: ____] : AJCC Stage: [unfilled] [de-identified] : Age 60: left axilla biopsy metastatic mammary carcinoma \par Screening mammogram done on 12/7/2019 which showed enlarged left axillary lymph node for which ultrasound guided biopsy was recommended and done on 1/21/2020. Pathology showed metastatic mammary carcinoma with lobular and signet ring cell features that is ER > 90%, MS 0%, and Jai5ean negative. MRI of the breast done 2/13/2020 showed nonmass enhancement in the upper inner left breast measuring 5 cm in AP dimension and abnormal left axillary lymph node with biopsy clip. Also 0.2 cm focus enhancing in the right breast. She underwent MRI guided biopsy of the left breast done on 3/11/2020. We reviewed neoadjuvant chemotherapy which she refused and was on anastrozole while awaiting surgery which was delayed due to covid. She underwent left lumpectomy with sentinel lymph node followed by left axillary lymph node dissection with Dr Hall on 5/5/2020. The pathology showed invasive lobular carcinoma, classic type, Grade 2, invasive component was 2.3 cm and 5 out of 7 lymph nodes positive for carcinoma: T2N2 ER 90%, MS 0%, Tku5ggq negative. We performed CT of the chest/ abd/ pelvis along with bone scan 5/22/2020 which showed indeterminate liver lesion. She had MRI of the abdomen 6/22/2020 done which showed hemangioma. She underwent prophylactic nipple sparing prophylactic right mastectomy and left mastectomy with additional lymph node removal on 6/30/2020 with Dr Hall. The pathology showed fibrocystic changes in the right prophylactic mastectomy and negative sentinel lymph node. The left mastectomy showed LCIS along with 5 additional positive lymph nodes.  [de-identified] : mammary carcinoma ER > 90%, IL 0%, Vut5yoa negative  [de-identified] : She will be seeing Dr Yi in follow up in beginning of September. She has МАРИНА flaps that have not healed around the nipple. She is present with her sister on the phone to review questions about chemotherapy. Had port placed with mild tenderness over area. She has FMLA forms and will consider working from home the second week after chemotherapy.

## 2020-08-20 NOTE — ASSESSMENT
[FreeTextEntry1] : She is a 59 y/o AAF Stage III left invasive lobular carcinoma who was on anastrozole while awaiting surgery s/p lumpectomy with axillary lymph node dissection in 5/5/2020 followed by mastectomy 6/30/2020 with additional lymph nodes removed. We reviewed supportive measures for AC to decrease nausea, GERD, bone pain, and fatigue. We reviewed her and her sister's questions. We reviewed lidocaine cream to be applied over port 1/2 hour prior to chemotherapy. We reviewed her plans for work and will help her with disability forms. We reviewed goals of chemotherapy followed by RT. We reviewed expectations for chemotherapy. Questions answered to their satisfaction. She had 2 D Echo which was WNL. She will have EKG done today along with blood work with hepatitis panel. Will confirm with her plastic surgeon when she can start chemotherapy: prefers to be on Wednesdays.

## 2020-08-21 ENCOUNTER — APPOINTMENT (OUTPATIENT)
Dept: WOUND CARE | Facility: CLINIC | Age: 60
End: 2020-08-21
Payer: COMMERCIAL

## 2020-08-21 DIAGNOSIS — Z98.82 BREAST IMPLANT STATUS: ICD-10-CM

## 2020-08-21 DIAGNOSIS — D06.9 CARCINOMA IN SITU OF CERVIX, UNSPECIFIED: ICD-10-CM

## 2020-08-21 DIAGNOSIS — R87.810 CERVICAL HIGH RISK HUMAN PAPILLOMAVIRUS (HPV) DNA TEST POSITIVE: ICD-10-CM

## 2020-08-21 LAB
ALBUMIN SERPL ELPH-MCNC: 4.2 G/DL
ALP BLD-CCNC: 86 U/L
ALT SERPL-CCNC: 8 U/L
ANION GAP SERPL CALC-SCNC: 19 MMOL/L
AST SERPL-CCNC: 18 U/L
BILIRUB SERPL-MCNC: 0.2 MG/DL
BUN SERPL-MCNC: 21 MG/DL
CALCIUM SERPL-MCNC: 9.5 MG/DL
CANCER AG27-29 SERPL-ACNC: 23.6 U/ML
CEA SERPL-MCNC: 2.3 NG/ML
CHLORIDE SERPL-SCNC: 106 MMOL/L
CO2 SERPL-SCNC: 19 MMOL/L
CREAT SERPL-MCNC: 0.79 MG/DL
GLUCOSE SERPL-MCNC: 105 MG/DL
HAV IGM SER QL: NONREACTIVE
HBV CORE IGM SER QL: NONREACTIVE
HBV SURFACE AG SER QL: NONREACTIVE
HCV AB SER QL: NONREACTIVE
HCV S/CO RATIO: 0.11 S/CO
POTASSIUM SERPL-SCNC: 4.5 MMOL/L
PROT SERPL-MCNC: 6.7 G/DL
SODIUM SERPL-SCNC: 144 MMOL/L

## 2020-08-21 PROCEDURE — 99244 OFF/OP CNSLTJ NEW/EST MOD 40: CPT

## 2020-08-25 NOTE — ASSESSMENT
[FreeTextEntry1] :  60 yr old woman with er + t2n2  left breast ca with failed nipple skin grafts bilaterally\par trial of medihoney bilaterally, with  abd and compression, autolytic debridement change every other day\par witchazel for cleansing/ soap and water\par compress with bra\par abd flap healed well, no necrosis- abd binder\par left arm minimal swelling no lymphedema currently\par port no cellulitis\par  datacomplexity lab, xr, old rec, test resultsreviewed\par risk- low surgery\par fup next week\par is a hbo candidate if conservative methods do not resolve wounds\par

## 2020-08-25 NOTE — PHYSICAL EXAM
[Normal Rate and Rhythm] : normal rate and rhythm [JVD] : no jugular venous distention  [Abdomen Tenderness] : ~T ~M No abdominal tenderness [2+] : left 2+ [Skin Ulcer] : ulcer [de-identified] : nad [de-identified] : transverse supra pubic healed [de-identified] : thierno [de-identified] : port [de-identified] : rom intact [de-identified] : umbilicus healed [FreeTextEntry1] : bilateral nipples [Please See PDF for [employer] Analytics] : Please See PDF for [employer] Analytics.

## 2020-08-25 NOTE — HISTORY OF PRESENT ILLNESS
[FreeTextEntry1] : location- bilateral breast wounds\par severity- failed skin graft s/p bilateral mastectomy for t2 n3 stage 3b lobular breast ca er +\par timing/duration since jan2020\par quality- with drainage- lumpectomy  surgery  5/5 and tram with mastectomies 6/30/2020\par other- wounds open, waiting to start chemo\par \par

## 2020-08-26 ENCOUNTER — OUTPATIENT (OUTPATIENT)
Dept: OUTPATIENT SERVICES | Facility: HOSPITAL | Age: 60
LOS: 1 days | Discharge: HOME | End: 2020-08-26

## 2020-08-26 DIAGNOSIS — Z90.13 ACQUIRED ABSENCE OF BILATERAL BREASTS AND NIPPLES: Chronic | ICD-10-CM

## 2020-08-26 DIAGNOSIS — Z98.890 OTHER SPECIFIED POSTPROCEDURAL STATES: Chronic | ICD-10-CM

## 2020-08-26 DIAGNOSIS — Z11.1 ENCOUNTER FOR SCREENING FOR RESPIRATORY TUBERCULOSIS: ICD-10-CM

## 2020-08-26 DIAGNOSIS — Z90.710 ACQUIRED ABSENCE OF BOTH CERVIX AND UTERUS: Chronic | ICD-10-CM

## 2020-08-28 ENCOUNTER — RESULT REVIEW (OUTPATIENT)
Age: 60
End: 2020-08-28

## 2020-08-28 ENCOUNTER — APPOINTMENT (OUTPATIENT)
Dept: WOUND CARE | Facility: CLINIC | Age: 60
End: 2020-08-28
Payer: COMMERCIAL

## 2020-08-28 PROCEDURE — 11042 DBRDMT SUBQ TIS 1ST 20SQCM/<: CPT

## 2020-08-28 NOTE — ASSESSMENT
[Stable] : stable [Ambulatory] : Ambulatory [FreeTextEntry1] : Wound Assessment and Plan:\par \par The patient presents with a wound to bilateral breasts\par Failed skin graft\par Patient is a candidate for HBOT\par s/p excisional debridement\par Patient states that she is not cancer free at this time.\par \par No clinical sign of infection\par Recommendation:\par \par Apply lidocaine or topical anesthetic if needed to reduce pain upon washing the wound.\par Wash wound with ----0.9% saline/ Dakins 0.125% or Dove skin sensitive soap and clean water \par Apply ---santyl/ \par Change dressing ---daily\par Leg elevation as tolerated\par Encouraged ambulation or exercise.\par Optimization of nutrition.\par Spandage\par \par -----Wound supplies ordered via DME\par Patient given contact information to DME\par \par \par Follow up appointment scheduled for 1-2 weeks\par \par TeleHealth Services discussed with the patient and/or family.  Discharge instructions given including download of Jerry information regarding:\par 1)  [facility] [employer] [facility] to obtain medical records\par 2)  [employer] [facility] to conduct Face-to-Face TeleHealth visit\par 3)  [employer] for the Patient (patient takes a picture of their wound which is sent to the patient's chart for review)\par

## 2020-08-28 NOTE — PHYSICAL EXAM
[Normal Rate and Rhythm] : normal rate and rhythm [2+] : left 2+ [Skin Ulcer] : ulcer [Please See PDF for Tissue Analytics] : Please See PDF for [employer]. [JVD] : no jugular venous distention  [Abdomen Tenderness] : ~T ~M No abdominal tenderness [de-identified] : nad [de-identified] : thierno [de-identified] : port [de-identified] : transverse supra pubic healed [de-identified] : umbilicus healed [de-identified] : rom intact

## 2020-09-01 ENCOUNTER — LABORATORY RESULT (OUTPATIENT)
Age: 60
End: 2020-09-01

## 2020-09-01 ENCOUNTER — APPOINTMENT (OUTPATIENT)
Dept: INFUSION THERAPY | Facility: HOSPITAL | Age: 60
End: 2020-09-01

## 2020-09-02 ENCOUNTER — APPOINTMENT (OUTPATIENT)
Dept: INFUSION THERAPY | Facility: HOSPITAL | Age: 60
End: 2020-09-02

## 2020-09-02 ENCOUNTER — APPOINTMENT (OUTPATIENT)
Dept: HEMATOLOGY ONCOLOGY | Facility: CLINIC | Age: 60
End: 2020-09-02
Payer: COMMERCIAL

## 2020-09-02 VITALS
RESPIRATION RATE: 17 BRPM | TEMPERATURE: 98.6 F | WEIGHT: 172.62 LBS | BODY MASS INDEX: 31.37 KG/M2 | DIASTOLIC BLOOD PRESSURE: 74 MMHG | OXYGEN SATURATION: 98 % | HEART RATE: 73 BPM | HEIGHT: 62.01 IN | SYSTOLIC BLOOD PRESSURE: 120 MMHG

## 2020-09-02 PROCEDURE — 99214 OFFICE O/P EST MOD 30 MIN: CPT

## 2020-09-02 NOTE — ASSESSMENT
[FreeTextEntry1] : She is a 61 y/o AAF Stage III left invasive lobular carcinoma who was on anastrozole while awaiting surgery s/p lumpectomy with axillary lymph node dissection in 5/5/2020 followed by mastectomy 6/30/2020 with additional lymph nodes removed. We reviewed supportive measures for AC to decrease nausea, GERD, bone pain, and fatigue. We reviewed her and her sister's questions. We reviewed lidocaine cream to be applied over port 1/2 hour prior to chemotherapy. We reviewed her plans for work and will help her with disability forms. We reviewed goals of chemotherapy followed by RT. We reviewed expectations for chemotherapy. Questions answered to their satisfaction. She had 2 D Echo which was WNL.  Confirmed with her plastic surgeon that she can start chemotherapy: prefers to be on Wednesdays. \par \par Plan is to start the chemotherapy next week likely Wednesday or Thursday\par ECHO WNL and hepatitis panel negative\par Emend, Decadron and PRN Prochlorperazine prescribed with written instructions: Meds will be provided to the patient on the day of chemotherapy \par EMLA cream for port site\par Claritin for bone pain

## 2020-09-02 NOTE — REVIEW OF SYSTEMS
[Skin Wound] : skin wound [Negative] : Allergic/Immunologic [Skin Rash] : no skin rash [de-identified] : МАРИНА flap wound

## 2020-09-02 NOTE — END OF VISIT
[] : Fellow [FreeTextEntry3] : Reviewed her МАРИНА flap wound with her Plastic surgeon: Dr Yi; will give 1 additional week to see if further improvement with current wound care regimen. We explained the wound may not be completely healed during adjuvant therapy and rationale of starting chemotherapy within 12 weeks of her surgery. We reviewed her questions about chemotherapy and wrote instructions for Claritin and lidocaine cream for port. Reviewed antiemetics; will reschedule treatment for next week.  [Time Spent: ___ minutes] : I have spent [unfilled] minutes of time on the encounter. [>50% of the face to face encounter time was spent on counseling and/or coordination of care for ___] : Greater than 50% of the face to face encounter time was spent on counseling and/or coordination of care for [unfilled]

## 2020-09-02 NOTE — PHYSICAL EXAM
[Restricted in physically strenuous activity but ambulatory and able to carry out work of a light or sedentary nature] : Status 1- Restricted in physically strenuous activity but ambulatory and able to carry out work of a light or sedentary nature, e.g., light house work, office work [Normal] : affect appropriate [de-identified] : normal respiratory effort: able to speak in full sentences without gasping or wheezing  [de-identified] : B МАРИНА flap reconstruction with dressing over wound: nipple reconstruction still with ulceration; clean base; no bleeding

## 2020-09-02 NOTE — HISTORY OF PRESENT ILLNESS
[Disease: _____________________] : Disease: [unfilled] [T: ___] : T[unfilled] [N: ___] : N[unfilled] [AJCC Stage: ____] : AJCC Stage: [unfilled] [de-identified] : Age 60: left axilla biopsy metastatic mammary carcinoma \par Screening mammogram done on 12/7/2019 which showed enlarged left axillary lymph node for which ultrasound guided biopsy was recommended and done on 1/21/2020. Pathology showed metastatic mammary carcinoma with lobular and signet ring cell features that is ER > 90%, VT 0%, and Ucf1tcj negative. MRI of the breast done 2/13/2020 showed nonmass enhancement in the upper inner left breast measuring 5 cm in AP dimension and abnormal left axillary lymph node with biopsy clip. Also 0.2 cm focus enhancing in the right breast. She underwent MRI guided biopsy of the left breast done on 3/11/2020. We reviewed neoadjuvant chemotherapy which she refused and was on anastrozole while awaiting surgery which was delayed due to covid. She underwent left lumpectomy with sentinel lymph node followed by left axillary lymph node dissection with Dr Hall on 5/5/2020. The pathology showed invasive lobular carcinoma, classic type, Grade 2, invasive component was 2.3 cm and 5 out of 7 lymph nodes positive for carcinoma: T2N2 ER 90%, VT 0%, Ald5icy negative. We performed CT of the chest/ abd/ pelvis along with bone scan 5/22/2020 which showed indeterminate liver lesion. She had MRI of the abdomen 6/22/2020 done which showed hemangioma. She underwent prophylactic nipple sparing prophylactic right mastectomy and left mastectomy with additional lymph node removal on 6/30/2020 with Dr Hall. The pathology showed fibrocystic changes in the right prophylactic mastectomy and negative sentinel lymph node. The left mastectomy showed LCIS along with 5 additional positive lymph nodes.  [de-identified] : Present for follow up afer Mastectomy and МАРИНА flap reconstruction with nipple site wound which is not completely healed and pt is following with wound care. Still feels the wound needs more time to heal. Denies any pain or fevers.  DId a Video call with Dr. Yi who looked at the b/l breast wounds and stated that the chemotherapy can be started. Plan is to start the chemotherapy next week.  [de-identified] : mammary carcinoma ER > 90%, MS 0%, Prg0vrc negative

## 2020-09-02 NOTE — PHYSICAL EXAM
[Restricted in physically strenuous activity but ambulatory and able to carry out work of a light or sedentary nature] : Status 1- Restricted in physically strenuous activity but ambulatory and able to carry out work of a light or sedentary nature, e.g., light house work, office work [Normal] : affect appropriate [de-identified] : normal respiratory effort: able to speak in full sentences without gasping or wheezing  [de-identified] : B МАРИНА flap reconstruction with dressing over wound: nipple reconstruction still with ulceration; clean base; no bleeding

## 2020-09-02 NOTE — REVIEW OF SYSTEMS
[Skin Wound] : skin wound [Negative] : Allergic/Immunologic [Skin Rash] : no skin rash [de-identified] : МАРИНА flap wound

## 2020-09-02 NOTE — END OF VISIT
[] : Fellow [Time Spent: ___ minutes] : I have spent [unfilled] minutes of time on the encounter. [FreeTextEntry3] : Reviewed her МАРИНА flap wound with her Plastic surgeon: Dr Yi; will give 1 additional week to see if further improvement with current wound care regimen. We explained the wound may not be completely healed during adjuvant therapy and rationale of starting chemotherapy within 12 weeks of her surgery. We reviewed her questions about chemotherapy and wrote instructions for Claritin and lidocaine cream for port. Reviewed antiemetics; will reschedule treatment for next week.  [>50% of the face to face encounter time was spent on counseling and/or coordination of care for ___] : Greater than 50% of the face to face encounter time was spent on counseling and/or coordination of care for [unfilled]

## 2020-09-02 NOTE — ASSESSMENT
[FreeTextEntry1] : She is a 59 y/o AAF Stage III left invasive lobular carcinoma who was on anastrozole while awaiting surgery s/p lumpectomy with axillary lymph node dissection in 5/5/2020 followed by mastectomy 6/30/2020 with additional lymph nodes removed. We reviewed supportive measures for AC to decrease nausea, GERD, bone pain, and fatigue. We reviewed her and her sister's questions. We reviewed lidocaine cream to be applied over port 1/2 hour prior to chemotherapy. We reviewed her plans for work and will help her with disability forms. We reviewed goals of chemotherapy followed by RT. We reviewed expectations for chemotherapy. Questions answered to their satisfaction. She had 2 D Echo which was WNL.  Confirmed with her plastic surgeon that she can start chemotherapy: prefers to be on Wednesdays. \par \par Plan is to start the chemotherapy next week likely Wednesday or Thursday\par ECHO WNL and hepatitis panel negative\par Emend, Decadron and PRN Prochlorperazine prescribed with written instructions: Meds will be provided to the patient on the day of chemotherapy \par EMLA cream for port site\par Claritin for bone pain

## 2020-09-02 NOTE — HISTORY OF PRESENT ILLNESS
[Disease: _____________________] : Disease: [unfilled] [T: ___] : T[unfilled] [N: ___] : N[unfilled] [AJCC Stage: ____] : AJCC Stage: [unfilled] [de-identified] : Age 60: left axilla biopsy metastatic mammary carcinoma \par Screening mammogram done on 12/7/2019 which showed enlarged left axillary lymph node for which ultrasound guided biopsy was recommended and done on 1/21/2020. Pathology showed metastatic mammary carcinoma with lobular and signet ring cell features that is ER > 90%, [place] 0%, and Swg6sbc negative. MRI of the breast done 2/13/2020 showed nonmass enhancement in the upper inner left breast measuring 5 cm in AP dimension and abnormal left axillary lymph node with biopsy clip. Also 0.2 cm focus enhancing in the right breast. She underwent MRI guided biopsy of the left breast done on 3/11/2020. We reviewed neoadjuvant chemotherapy which she refused and was on anastrozole while awaiting surgery which was delayed due to covid. She underwent left lumpectomy with sentinel lymph node followed by left axillary lymph node dissection with Dr Hall on 5/5/2020. The pathology showed invasive lobular carcinoma, classic type, Grade 2, invasive component was 2.3 cm and 5 out of 7 lymph nodes positive for carcinoma: T2N2 ER 90%, MN 0%, Jsz2miw negative. We performed CT of the chest/ abd/ pelvis along with bone scan 5/22/2020 which showed indeterminate liver lesion. She had MRI of the abdomen 6/22/2020 done which showed hemangioma. She underwent prophylactic nipple sparing prophylactic right mastectomy and left mastectomy with additional lymph node removal on 6/30/2020 with Dr Hall. The pathology showed fibrocystic changes in the right prophylactic mastectomy and negative sentinel lymph node. The left mastectomy showed LCIS along with 5 additional positive lymph nodes.  [de-identified] : mammary carcinoma ER > 90%, CT 0%, Gfz6vdv negative  [de-identified] : Present for follow up afer Mastectomy and МАРИНА flap reconstruction with nipple site wound which is not completely healed and pt is following with wound care. Still feels the wound needs more time to heal. Denies any pain or fevers.  DId a Video call with Dr. Yi who looked at the b/l breast wounds and stated that the chemotherapy can be started. Plan is to start the chemotherapy next week.

## 2020-09-09 ENCOUNTER — APPOINTMENT (OUTPATIENT)
Dept: HEMATOLOGY ONCOLOGY | Facility: CLINIC | Age: 60
End: 2020-09-09
Payer: COMMERCIAL

## 2020-09-09 ENCOUNTER — APPOINTMENT (OUTPATIENT)
Dept: INFUSION THERAPY | Facility: HOSPITAL | Age: 60
End: 2020-09-09

## 2020-09-09 ENCOUNTER — RESULT REVIEW (OUTPATIENT)
Age: 60
End: 2020-09-09

## 2020-09-09 VITALS
RESPIRATION RATE: 16 BRPM | HEIGHT: 61.42 IN | HEART RATE: 73 BPM | WEIGHT: 171.96 LBS | OXYGEN SATURATION: 99 % | SYSTOLIC BLOOD PRESSURE: 122 MMHG | TEMPERATURE: 98.2 F | BODY MASS INDEX: 32.05 KG/M2 | DIASTOLIC BLOOD PRESSURE: 76 MMHG

## 2020-09-09 DIAGNOSIS — C50.919 MALIGNANT NEOPLASM OF UNSPECIFIED SITE OF UNSPECIFIED FEMALE BREAST: ICD-10-CM

## 2020-09-09 DIAGNOSIS — Z51.89 ENCOUNTER FOR OTHER SPECIFIED AFTERCARE: ICD-10-CM

## 2020-09-09 DIAGNOSIS — Z51.11 ENCOUNTER FOR ANTINEOPLASTIC CHEMOTHERAPY: ICD-10-CM

## 2020-09-09 DIAGNOSIS — R11.2 NAUSEA WITH VOMITING, UNSPECIFIED: ICD-10-CM

## 2020-09-09 LAB
BASOPHILS # BLD AUTO: 0.02 K/UL — SIGNIFICANT CHANGE UP (ref 0–0.2)
BASOPHILS NFR BLD AUTO: 0.3 % — SIGNIFICANT CHANGE UP (ref 0–2)
EOSINOPHIL # BLD AUTO: 0.24 K/UL — SIGNIFICANT CHANGE UP (ref 0–0.5)
EOSINOPHIL NFR BLD AUTO: 3.6 % — SIGNIFICANT CHANGE UP (ref 0–6)
HCT VFR BLD CALC: 34.1 % — LOW (ref 34.5–45)
HGB BLD-MCNC: 10.7 G/DL — LOW (ref 11.5–15.5)
IMM GRANULOCYTES NFR BLD AUTO: 0.3 % — SIGNIFICANT CHANGE UP (ref 0–1.5)
LYMPHOCYTES # BLD AUTO: 2.14 K/UL — SIGNIFICANT CHANGE UP (ref 1–3.3)
LYMPHOCYTES # BLD AUTO: 32.5 % — SIGNIFICANT CHANGE UP (ref 13–44)
MCHC RBC-ENTMCNC: 24.7 PG — LOW (ref 27–34)
MCHC RBC-ENTMCNC: 31.4 G/DL — LOW (ref 32–36)
MCV RBC AUTO: 78.8 FL — LOW (ref 80–100)
MONOCYTES # BLD AUTO: 0.5 K/UL — SIGNIFICANT CHANGE UP (ref 0–0.9)
MONOCYTES NFR BLD AUTO: 7.6 % — SIGNIFICANT CHANGE UP (ref 2–14)
NEUTROPHILS # BLD AUTO: 3.67 K/UL — SIGNIFICANT CHANGE UP (ref 1.8–7.4)
NEUTROPHILS NFR BLD AUTO: 55.7 % — SIGNIFICANT CHANGE UP (ref 43–77)
NRBC # BLD: 0 /100 WBCS — SIGNIFICANT CHANGE UP (ref 0–0)
PLATELET # BLD AUTO: 279 K/UL — SIGNIFICANT CHANGE UP (ref 150–400)
RBC # BLD: 4.33 M/UL — SIGNIFICANT CHANGE UP (ref 3.8–5.2)
RBC # FLD: 13.4 % — SIGNIFICANT CHANGE UP (ref 10.3–14.5)
WBC # BLD: 6.59 K/UL — SIGNIFICANT CHANGE UP (ref 3.8–10.5)
WBC # FLD AUTO: 6.59 K/UL — SIGNIFICANT CHANGE UP (ref 3.8–10.5)

## 2020-09-09 PROCEDURE — 99214 OFFICE O/P EST MOD 30 MIN: CPT

## 2020-09-10 RX ORDER — IBUPROFEN 400 MG/1
400 TABLET, FILM COATED ORAL
Qty: 21 | Refills: 0 | Status: DISCONTINUED | COMMUNITY
Start: 2020-07-02

## 2020-09-10 RX ORDER — TRIAMCINOLONE ACETONIDE 5 MG/G
0.5 OINTMENT TOPICAL
Qty: 15 | Refills: 0 | Status: DISCONTINUED | COMMUNITY
Start: 2020-04-20

## 2020-09-10 RX ORDER — LACTULOSE 10 G/15ML
10 SOLUTION ORAL
Qty: 420 | Refills: 0 | Status: DISCONTINUED | COMMUNITY
Start: 2020-07-06

## 2020-09-10 RX ORDER — ONDANSETRON 4 MG/1
4 TABLET, ORALLY DISINTEGRATING ORAL
Qty: 20 | Refills: 0 | Status: DISCONTINUED | COMMUNITY
Start: 2020-07-07

## 2020-09-10 RX ORDER — OXYCODONE 5 MG/1
5 TABLET ORAL
Qty: 16 | Refills: 0 | Status: DISCONTINUED | COMMUNITY
Start: 2020-07-02

## 2020-09-10 RX ORDER — ONDANSETRON 4 MG/1
4 TABLET ORAL
Qty: 9 | Refills: 0 | Status: DISCONTINUED | COMMUNITY
Start: 2020-07-02

## 2020-09-10 RX ORDER — OXYCODONE AND ACETAMINOPHEN 5; 325 MG/1; MG/1
5-325 TABLET ORAL
Qty: 10 | Refills: 0 | Status: DISCONTINUED | COMMUNITY
Start: 2020-05-05

## 2020-09-10 NOTE — ASSESSMENT
[FreeTextEntry1] : She is a 59 y/o AAF Stage III left invasive lobular carcinoma who was on anastrozole while awaiting surgery s/p lumpectomy with axillary lymph node dissection in 5/5/2020 followed by mastectomy 6/30/2020 with additional lymph nodes removed. She continues with wound care and will proceed with planned chemotherapy: dose dense AC and will monitor progress on therapy. We reviewed supportive medications and follow up.

## 2020-09-10 NOTE — HISTORY OF PRESENT ILLNESS
[Disease: _____________________] : Disease: [unfilled] [T: ___] : T[unfilled] [N: ___] : N[unfilled] [AJCC Stage: ____] : AJCC Stage: [unfilled] [de-identified] : Age 60: left axilla biopsy metastatic mammary carcinoma \par Screening mammogram done on 12/7/2019 which showed enlarged left axillary lymph node for which ultrasound guided biopsy was recommended and done on 1/21/2020. Pathology showed metastatic mammary carcinoma with lobular and signet ring cell features that is ER > 90%, [place] 0%, and Hjj5gex negative. MRI of the breast done 2/13/2020 showed nonmass enhancement in the upper inner left breast measuring 5 cm in AP dimension and abnormal left axillary lymph node with biopsy clip. Also 0.2 cm focus enhancing in the right breast. She underwent MRI guided biopsy of the left breast done on 3/11/2020. We reviewed neoadjuvant chemotherapy which she refused and was on anastrozole while awaiting surgery which was delayed due to covid. She underwent left lumpectomy with sentinel lymph node followed by left axillary lymph node dissection with Dr Hall on 5/5/2020. The pathology showed invasive lobular carcinoma, classic type, Grade 2, invasive component was 2.3 cm and 5 out of 7 lymph nodes positive for carcinoma: T2N2 ER 90%, [place] 0%, Lqi2mcd negative. We performed CT of the chest/ abd/ pelvis along with bone scan 5/22/2020 which showed indeterminate liver lesion. She had MRI of the abdomen 6/22/2020 done which showed hemangioma. She underwent prophylactic nipple sparing prophylactic right mastectomy and left mastectomy with additional lymph node removal on 6/30/2020 with Dr Hall. The pathology showed fibrocystic changes in the right prophylactic mastectomy and negative sentinel lymph node. The left mastectomy showed LCIS along with 5 additional positive lymph nodes.  [de-identified] : mammary carcinoma ER > 90%, OR 0%, Opq7xus negative  [de-identified] : She had follow up with wound care again. She just had dressings changed today and feeling her wounds are continuing to heal. Denies any new pain over the reconstruction site.

## 2020-09-10 NOTE — REASON FOR VISIT
[Follow-Up Visit] : a follow-up [FreeTextEntry2] : follow up for breast cancer to start adjuvant AC

## 2020-09-10 NOTE — PHYSICAL EXAM
[Restricted in physically strenuous activity but ambulatory and able to carry out work of a light or sedentary nature] : Status 1- Restricted in physically strenuous activity but ambulatory and able to carry out work of a light or sedentary nature, e.g., light house work, office work [Normal] : affect appropriate [de-identified] : normal respiratory effort: able to speak in full sentences without gasping or wheezing  [de-identified] : B МАРИНА flap reconstruction with dressing over wound: nipple reconstruction with ulceration; clean base; no bleeding

## 2020-09-11 ENCOUNTER — APPOINTMENT (OUTPATIENT)
Dept: WOUND CARE | Facility: CLINIC | Age: 60
End: 2020-09-11
Payer: COMMERCIAL

## 2020-09-11 PROCEDURE — 99213 OFFICE O/P EST LOW 20 MIN: CPT | Mod: 95

## 2020-09-15 NOTE — PHYSICAL EXAM
[Normal Rate and Rhythm] : normal rate and rhythm [2+] : right 2+ [Skin Ulcer] : ulcer [Please See PDF for [employer] Analytics] : Please See PDF for [employer]. [JVD] : no jugular venous distention  [Abdomen Tenderness] : ~T ~M No abdominal tenderness [de-identified] : nad [de-identified] : thierno [de-identified] : rom intact [de-identified] : transverse supra pubic healed [de-identified] : port [de-identified] : umbilicus healed

## 2020-09-15 NOTE — HISTORY OF PRESENT ILLNESS
[FreeTextEntry1] : location- bilateral breast wounds- no fevers\par wearing bra\par on chemo\par severity- failed skin graft s/p bilateral mastectomy for t2 n3 stage 3b lobular breast ca er +\par timing/duration since jan2020\par quality- with drainage- lumpectomy  surgery  5/5 and tram with mastectomies 6/30/2020\par other- wounds open, waiting to start chemo\par \par

## 2020-09-15 NOTE — ASSESSMENT
[Stable] : stable [Ambulatory] : Ambulatory [FreeTextEntry1] : Wound Assessment and Plan:\par \par The patient presents with a wound to bilateral breasts/ on chemo\par \par Failed skin graft\par Patient is a candidate for HBOT\par s/p excisional debridement\par Patient states that she is not cancer free at this time.\par \par No clinical sign of infection\par Recommendation:\par \par Apply lidocaine or topical anesthetic if needed to reduce pain upon washing the wound.\par Wash wound with ----0.9% saline/ Dakins 0.125% or Dove skin sensitive soap and clean water \par Apply ---santyl/ \par Change dressing ---daily\par Leg elevation as tolerated\par Encouraged ambulation or exercise.\par Optimization of nutrition.\par Spandage\par \par -----Wound supplies ordered via DME\par Patient given contact information to DME\par \par \par Follow up appointment scheduled for 1-2 weeks\par \par TeleHealth Services discussed with the patient and/or family.  Discharge instructions given including download of Jerry information regarding:\par 1)  [employer] [facility] to obtain medical records\par 2)  [employer] [facility] [facility] to conduct Face-to-Face TeleHealth visit\par 3)  [employer] for the Patient (patient takes a picture of their wound which is sent to the patient's chart for review)\par

## 2020-09-16 ENCOUNTER — APPOINTMENT (OUTPATIENT)
Dept: HEMATOLOGY ONCOLOGY | Facility: CLINIC | Age: 60
End: 2020-09-16

## 2020-09-23 ENCOUNTER — RESULT REVIEW (OUTPATIENT)
Age: 60
End: 2020-09-23

## 2020-09-23 ENCOUNTER — APPOINTMENT (OUTPATIENT)
Dept: INFUSION THERAPY | Facility: HOSPITAL | Age: 60
End: 2020-09-23

## 2020-09-23 ENCOUNTER — LABORATORY RESULT (OUTPATIENT)
Age: 60
End: 2020-09-23

## 2020-09-23 ENCOUNTER — APPOINTMENT (OUTPATIENT)
Dept: HEMATOLOGY ONCOLOGY | Facility: CLINIC | Age: 60
End: 2020-09-23
Payer: COMMERCIAL

## 2020-09-23 VITALS
HEART RATE: 89 BPM | HEIGHT: 62.2 IN | RESPIRATION RATE: 16 BRPM | WEIGHT: 169.09 LBS | TEMPERATURE: 98.6 F | DIASTOLIC BLOOD PRESSURE: 75 MMHG | OXYGEN SATURATION: 99 % | SYSTOLIC BLOOD PRESSURE: 112 MMHG | BODY MASS INDEX: 30.72 KG/M2

## 2020-09-23 LAB
BASOPHILS # BLD AUTO: 0 K/UL — SIGNIFICANT CHANGE UP (ref 0–0.2)
BASOPHILS NFR BLD AUTO: 0 % — SIGNIFICANT CHANGE UP (ref 0–2)
EOSINOPHIL # BLD AUTO: 0.33 K/UL — SIGNIFICANT CHANGE UP (ref 0–0.5)
EOSINOPHIL NFR BLD AUTO: 2 % — SIGNIFICANT CHANGE UP (ref 0–6)
HCT VFR BLD CALC: 33.3 % — LOW (ref 34.5–45)
HGB BLD-MCNC: 10.3 G/DL — LOW (ref 11.5–15.5)
LYMPHOCYTES # BLD AUTO: 13 % — SIGNIFICANT CHANGE UP (ref 13–44)
LYMPHOCYTES # BLD AUTO: 2.16 K/UL — SIGNIFICANT CHANGE UP (ref 1–3.3)
MCHC RBC-ENTMCNC: 24.8 PG — LOW (ref 27–34)
MCHC RBC-ENTMCNC: 30.9 G/DL — LOW (ref 32–36)
MCV RBC AUTO: 80 FL — SIGNIFICANT CHANGE UP (ref 80–100)
MONOCYTES # BLD AUTO: 0.83 K/UL — SIGNIFICANT CHANGE UP (ref 0–0.9)
MONOCYTES NFR BLD AUTO: 5 % — SIGNIFICANT CHANGE UP (ref 2–14)
MYELOCYTES NFR BLD: 2 % — HIGH (ref 0–0)
NEUTROPHILS # BLD AUTO: 12.99 K/UL — HIGH (ref 1.8–7.4)
NEUTROPHILS NFR BLD AUTO: 78 % — HIGH (ref 43–77)
NRBC # BLD: 0 /100 — SIGNIFICANT CHANGE UP (ref 0–0)
NRBC # BLD: SIGNIFICANT CHANGE UP /100 WBCS (ref 0–0)
PLAT MORPH BLD: NORMAL — SIGNIFICANT CHANGE UP
PLATELET # BLD AUTO: 237 K/UL — SIGNIFICANT CHANGE UP (ref 150–400)
RBC # BLD: 4.16 M/UL — SIGNIFICANT CHANGE UP (ref 3.8–5.2)
RBC # FLD: 14.1 % — SIGNIFICANT CHANGE UP (ref 10.3–14.5)
RBC BLD AUTO: SIGNIFICANT CHANGE UP
WBC # BLD: 16.65 K/UL — HIGH (ref 3.8–10.5)
WBC # FLD AUTO: 16.65 K/UL — HIGH (ref 3.8–10.5)

## 2020-09-23 PROCEDURE — 99214 OFFICE O/P EST MOD 30 MIN: CPT

## 2020-09-23 NOTE — ASSESSMENT
[FreeTextEntry1] : She is a 59 y/o AAF Stage III left invasive lobular carcinoma who was on anastrozole while awaiting surgery s/p lumpectomy with axillary lymph node dissection in 5/5/2020 followed by mastectomy 6/30/2020 with additional lymph nodes removed. She continues with wound care with МАРИНА flap site healing while on chemotherapy. She tolerated 1st cycle of AC with expected fatigue, GI upset, and arthralgias. We reviewed continued supportive measures for cumulative symptoms. We reviewed antiemetics. We reviewed with her expectations with this cycle. Her counts today show leucocytosis due to Onpro. She has baseline anemia which is stable. Next follow up in 2 weeks.

## 2020-09-23 NOTE — REVIEW OF SYSTEMS
[Fever] : no fever [Chills] : no chills [Night Sweats] : no night sweats [Fatigue] : fatigue [Recent Change In Weight] : ~T no recent weight change [Joint Pain] : joint pain [Joint Stiffness] : no joint stiffness [Muscle Pain] : no muscle pain [Skin Rash] : no skin rash [Skin Wound] : skin wound [Negative] : Allergic/Immunologic [de-identified] : over the МАРИНА flap slowly healing

## 2020-09-23 NOTE — PHYSICAL EXAM
[Restricted in physically strenuous activity but ambulatory and able to carry out work of a light or sedentary nature] : Status 1- Restricted in physically strenuous activity but ambulatory and able to carry out work of a light or sedentary nature, e.g., light house work, office work [Normal] : affect appropriate [de-identified] : B МАРИНА flap reconstruction with dressing over wound: nipple reconstruction with ulceration resolving ; clean base; no bleeding

## 2020-09-23 NOTE — HISTORY OF PRESENT ILLNESS
[Disease: _____________________] : Disease: [unfilled] [T: ___] : T[unfilled] [N: ___] : N[unfilled] [AJCC Stage: ____] : AJCC Stage: [unfilled] [de-identified] : Age 60: left axilla biopsy metastatic mammary carcinoma \par Screening mammogram done on 12/7/2019 which showed enlarged left axillary lymph node for which ultrasound guided biopsy was recommended and done on 1/21/2020. Pathology showed metastatic mammary carcinoma with lobular and signet ring cell features that is ER > 90%, PA 0%, and Ipj0rnh negative. MRI of the breast done 2/13/2020 showed nonmass enhancement in the upper inner left breast measuring 5 cm in AP dimension and abnormal left axillary lymph node with biopsy clip. Also 0.2 cm focus enhancing in the right breast. She underwent MRI guided biopsy of the left breast done on 3/11/2020. We reviewed neoadjuvant chemotherapy which she refused and was on anastrozole while awaiting surgery which was delayed due to covid. She underwent left lumpectomy with sentinel lymph node followed by left axillary lymph node dissection with Dr Hall on 5/5/2020. The pathology showed invasive lobular carcinoma, classic type, Grade 2, invasive component was 2.3 cm and 5 out of 7 lymph nodes positive for carcinoma: T2N2 ER 90%, PA 0%, Xts9hos negative. We performed CT of the chest/ abd/ pelvis along with bone scan 5/22/2020 which showed indeterminate liver lesion. She had MRI of the abdomen 6/22/2020 done which showed hemangioma. She underwent prophylactic nipple sparing prophylactic right mastectomy and left mastectomy with additional lymph node removal on 6/30/2020 with Dr Hall. The pathology showed fibrocystic changes in the right prophylactic mastectomy and negative sentinel lymph node. The left mastectomy showed LCIS along with 5 additional positive lymph nodes.  [de-identified] : mammary carcinoma ER > 90%, CO 0%, Dcq6pzz negative  [de-identified] : dose dense AC 9/9/2020 to present  [de-identified] : She noticed fatigue D2 and D3 after chemotherapy. She had mild stomach queasiness but improved after 1st week. She had mild joint pain but took Claritin and felt it was improved. Her МАРИНА flap wounds are continuing to heal. She has dressing changes three times a week and feels area continues to heal. She denies any worsening chest wall pain or palpitations. She is able to eat and drink fluids. She has taken off of work during this period until October 31st to allow for her to tolerate her treatment.

## 2020-09-30 ENCOUNTER — OUTPATIENT (OUTPATIENT)
Dept: OUTPATIENT SERVICES | Facility: HOSPITAL | Age: 60
LOS: 1 days | Discharge: HOME | End: 2020-09-30

## 2020-09-30 DIAGNOSIS — Z11.1 ENCOUNTER FOR SCREENING FOR RESPIRATORY TUBERCULOSIS: ICD-10-CM

## 2020-09-30 DIAGNOSIS — Z98.890 OTHER SPECIFIED POSTPROCEDURAL STATES: Chronic | ICD-10-CM

## 2020-09-30 DIAGNOSIS — Z90.13 ACQUIRED ABSENCE OF BILATERAL BREASTS AND NIPPLES: Chronic | ICD-10-CM

## 2020-09-30 DIAGNOSIS — Z90.710 ACQUIRED ABSENCE OF BOTH CERVIX AND UTERUS: Chronic | ICD-10-CM

## 2020-10-07 ENCOUNTER — RESULT REVIEW (OUTPATIENT)
Age: 60
End: 2020-10-07

## 2020-10-07 ENCOUNTER — LABORATORY RESULT (OUTPATIENT)
Age: 60
End: 2020-10-07

## 2020-10-07 ENCOUNTER — APPOINTMENT (OUTPATIENT)
Dept: INFUSION THERAPY | Facility: HOSPITAL | Age: 60
End: 2020-10-07

## 2020-10-07 ENCOUNTER — APPOINTMENT (OUTPATIENT)
Dept: HEMATOLOGY ONCOLOGY | Facility: CLINIC | Age: 60
End: 2020-10-07
Payer: COMMERCIAL

## 2020-10-07 VITALS
WEIGHT: 169.76 LBS | SYSTOLIC BLOOD PRESSURE: 113 MMHG | DIASTOLIC BLOOD PRESSURE: 61 MMHG | BODY MASS INDEX: 30.84 KG/M2 | HEART RATE: 76 BPM | RESPIRATION RATE: 16 BRPM | OXYGEN SATURATION: 99 % | TEMPERATURE: 98.6 F | HEIGHT: 62.2 IN

## 2020-10-07 DIAGNOSIS — Z01.818 ENCOUNTER FOR OTHER PREPROCEDURAL EXAMINATION: ICD-10-CM

## 2020-10-07 DIAGNOSIS — Z51.11 ENCOUNTER FOR ANTINEOPLASTIC CHEMOTHERAPY: ICD-10-CM

## 2020-10-07 DIAGNOSIS — C50.919 MALIGNANT NEOPLASM OF UNSPECIFIED SITE OF UNSPECIFIED FEMALE BREAST: ICD-10-CM

## 2020-10-07 DIAGNOSIS — Z51.89 ENCOUNTER FOR OTHER SPECIFIED AFTERCARE: ICD-10-CM

## 2020-10-07 DIAGNOSIS — R11.2 NAUSEA WITH VOMITING, UNSPECIFIED: ICD-10-CM

## 2020-10-07 LAB
BASOPHILS # BLD AUTO: 0 K/UL — SIGNIFICANT CHANGE UP (ref 0–0.2)
BASOPHILS NFR BLD AUTO: 0 % — SIGNIFICANT CHANGE UP (ref 0–2)
EOSINOPHIL # BLD AUTO: 0.13 K/UL — SIGNIFICANT CHANGE UP (ref 0–0.5)
EOSINOPHIL NFR BLD AUTO: 1 % — SIGNIFICANT CHANGE UP (ref 0–6)
HCT VFR BLD CALC: 30.7 % — LOW (ref 34.5–45)
HGB BLD-MCNC: 9.9 G/DL — LOW (ref 11.5–15.5)
LYMPHOCYTES # BLD AUTO: 1.55 K/UL — SIGNIFICANT CHANGE UP (ref 1–3.3)
LYMPHOCYTES # BLD AUTO: 12 % — LOW (ref 13–44)
MCHC RBC-ENTMCNC: 24.6 PG — LOW (ref 27–34)
MCHC RBC-ENTMCNC: 32.2 G/DL — SIGNIFICANT CHANGE UP (ref 32–36)
MCV RBC AUTO: 76.4 FL — LOW (ref 80–100)
METAMYELOCYTES # FLD: 1 % — HIGH (ref 0–0)
MONOCYTES # BLD AUTO: 0.9 K/UL — SIGNIFICANT CHANGE UP (ref 0–0.9)
MONOCYTES NFR BLD AUTO: 7 % — SIGNIFICANT CHANGE UP (ref 2–14)
MYELOCYTES NFR BLD: 5 % — HIGH (ref 0–0)
NEUTROPHILS # BLD AUTO: 9.4 K/UL — HIGH (ref 1.8–7.4)
NEUTROPHILS NFR BLD AUTO: 73 % — SIGNIFICANT CHANGE UP (ref 43–77)
NRBC # BLD: 0 /100 — SIGNIFICANT CHANGE UP (ref 0–0)
NRBC # BLD: SIGNIFICANT CHANGE UP /100 WBCS (ref 0–0)
PLAT MORPH BLD: NORMAL — SIGNIFICANT CHANGE UP
PLATELET # BLD AUTO: 213 K/UL — SIGNIFICANT CHANGE UP (ref 150–400)
PROMYELOCYTES # FLD: 1 % — HIGH (ref 0–0)
RBC # BLD: 4.02 M/UL — SIGNIFICANT CHANGE UP (ref 3.8–5.2)
RBC # FLD: 14.6 % — HIGH (ref 10.3–14.5)
RBC BLD AUTO: SIGNIFICANT CHANGE UP
WBC # BLD: 12.88 K/UL — HIGH (ref 3.8–10.5)
WBC # FLD AUTO: 12.88 K/UL — HIGH (ref 3.8–10.5)

## 2020-10-07 PROCEDURE — 99214 OFFICE O/P EST MOD 30 MIN: CPT

## 2020-10-08 NOTE — HISTORY OF PRESENT ILLNESS
[Disease: _____________________] : Disease: [unfilled] [T: ___] : T[unfilled] [N: ___] : N[unfilled] [AJCC Stage: ____] : AJCC Stage: [unfilled] [Cycle: ___] : Cycle: [unfilled] [de-identified] : Age 60: left axilla biopsy metastatic mammary carcinoma \par Screening mammogram done on 12/7/2019 which showed enlarged left axillary lymph node for which ultrasound guided biopsy was recommended and done on 1/21/2020. Pathology showed metastatic mammary carcinoma with lobular and signet ring cell features that is ER > 90%, VA 0%, and Kge6nfw negative. MRI of the breast done 2/13/2020 showed nonmass enhancement in the upper inner left breast measuring 5 cm in AP dimension and abnormal left axillary lymph node with biopsy clip. Also 0.2 cm focus enhancing in the right breast. She underwent MRI guided biopsy of the left breast done on 3/11/2020. We reviewed neoadjuvant chemotherapy which she refused and was on anastrozole while awaiting surgery which was delayed due to covid. She underwent left lumpectomy with sentinel lymph node followed by left axillary lymph node dissection with Dr Hall on 5/5/2020. The pathology showed invasive lobular carcinoma, classic type, Grade 2, invasive component was 2.3 cm and 5 out of 7 lymph nodes positive for carcinoma: T2N2 ER 90%, VA 0%, Wem4mlf negative. We performed CT of the chest/ abd/ pelvis along with bone scan 5/22/2020 which showed indeterminate liver lesion. She had MRI of the abdomen 6/22/2020 done which showed hemangioma. She underwent prophylactic nipple sparing prophylactic right mastectomy and left mastectomy with additional lymph node removal on 6/30/2020 with Dr Hall. The pathology showed fibrocystic changes in the right prophylactic mastectomy and negative sentinel lymph node. The left mastectomy showed LCIS along with 5 additional positive lymph nodes.  [de-identified] : mammary carcinoma ER > 90%, MD 0%, Uxb1vxr negative  [de-identified] : A [de-identified] : dose dense AC 9/9/2020 to present  [FreeTextEntry1] : AC + ONPRO  10/7/20  [de-identified] : She noticed fatigue D2 and D3 after chemotherapy but has been able to do long walks and short bike rides \par Denies chest pain, palpitations, SOB, TSE \par Uses Miralax for constipation, Senna tea - 1-2 days, hemmorhoids \par Could drink more fluids, but taste dyguesia . No mouth sores, oral pain, dysphagia \par She notes abdominal queezy feeling at times.   Deels better when she eats. Could drink more fluids, but taste dyguesia \par She denies nausea, emesis, abdominal pain, bloat distension or cramping. \par She denies falls or trauma\par Denies any acute bony pain/arthralgias. No claritin use \par Denies changes to healing breast flaps \par She has taken off of work during this period until October 31st to allow for her to tolerate her treatment.

## 2020-10-08 NOTE — ASSESSMENT
[Curative] : Goals of care discussed with patient: Curative [FreeTextEntry1] : She is a 61 y/o AAF Stage III left invasive lobular carcinoma who was on anastrozole while awaiting surgery s/p lumpectomy with axillary lymph node dissection in 5/5/2020 followed by mastectomy 6/30/2020 with additional lymph nodes removed. She continues with wound care with МАРИНА flap site healing while on chemotherapy. No further ulcerations or drainage.  She tolerated cycle 2 of AC with expected fatigue, GI upset, and arthralgias   We reviewed continued supportive measures for cumulative symptoms. We reviewed antiemetics Reviewed dietrary/vitamin restrictions Encouraged increase hydration with a variety of beverages. . We reviewed with her expectations with this cycle.   Her counts today show leucocytosis due to Onpro. She has baseline anemia which is stable.  Discussed Taxol after AC chemotherapy.  She would like to complete every two weeks for assistance with return to work.  Discussed pre-meds and potential allergy to Cremophor.  Next follow up in 2 weeks with Dr. Lucia for C4.   Encouraged c/b for issues concerns.

## 2020-10-08 NOTE — REVIEW OF SYSTEMS
[Fatigue] : fatigue [Joint Pain] : joint pain [Skin Wound] : skin wound [Negative] : Allergic/Immunologic [Fever] : no fever [Chills] : no chills [Night Sweats] : no night sweats [Recent Change In Weight] : ~T no recent weight change [Joint Stiffness] : no joint stiffness [Muscle Pain] : no muscle pain [Skin Rash] : no skin rash [de-identified] : over the МАРИНА flap slowly healing

## 2020-10-21 ENCOUNTER — RESULT REVIEW (OUTPATIENT)
Age: 60
End: 2020-10-21

## 2020-10-21 ENCOUNTER — LABORATORY RESULT (OUTPATIENT)
Age: 60
End: 2020-10-21

## 2020-10-21 ENCOUNTER — APPOINTMENT (OUTPATIENT)
Dept: HEMATOLOGY ONCOLOGY | Facility: CLINIC | Age: 60
End: 2020-10-21

## 2020-10-21 ENCOUNTER — APPOINTMENT (OUTPATIENT)
Dept: INFUSION THERAPY | Facility: HOSPITAL | Age: 60
End: 2020-10-21

## 2020-10-21 ENCOUNTER — APPOINTMENT (OUTPATIENT)
Dept: HEMATOLOGY ONCOLOGY | Facility: CLINIC | Age: 60
End: 2020-10-21
Payer: COMMERCIAL

## 2020-10-21 VITALS
HEART RATE: 89 BPM | RESPIRATION RATE: 15 BRPM | WEIGHT: 171.96 LBS | DIASTOLIC BLOOD PRESSURE: 86 MMHG | HEIGHT: 62.2 IN | OXYGEN SATURATION: 97 % | SYSTOLIC BLOOD PRESSURE: 138 MMHG | BODY MASS INDEX: 31.24 KG/M2 | TEMPERATURE: 98.3 F

## 2020-10-21 LAB
BASOPHILS # BLD AUTO: 0 K/UL — SIGNIFICANT CHANGE UP (ref 0–0.2)
BASOPHILS NFR BLD AUTO: 0 % — SIGNIFICANT CHANGE UP (ref 0–2)
EOSINOPHIL # BLD AUTO: 0.51 K/UL — HIGH (ref 0–0.5)
EOSINOPHIL NFR BLD AUTO: 3 % — SIGNIFICANT CHANGE UP (ref 0–6)
HCT VFR BLD CALC: 29.4 % — LOW (ref 34.5–45)
HGB BLD-MCNC: 9.3 G/DL — LOW (ref 11.5–15.5)
LYMPHOCYTES # BLD AUTO: 20 % — SIGNIFICANT CHANGE UP (ref 13–44)
LYMPHOCYTES # BLD AUTO: 3.43 K/UL — HIGH (ref 1–3.3)
MCHC RBC-ENTMCNC: 24.7 PG — LOW (ref 27–34)
MCHC RBC-ENTMCNC: 31.6 G/DL — LOW (ref 32–36)
MCV RBC AUTO: 78 FL — LOW (ref 80–100)
METAMYELOCYTES # FLD: 1 % — HIGH (ref 0–0)
MONOCYTES # BLD AUTO: 0.51 K/UL — SIGNIFICANT CHANGE UP (ref 0–0.9)
MONOCYTES NFR BLD AUTO: 3 % — SIGNIFICANT CHANGE UP (ref 2–14)
MYELOCYTES NFR BLD: 2 % — HIGH (ref 0–0)
NEUTROPHILS # BLD AUTO: 12.17 K/UL — HIGH (ref 1.8–7.4)
NEUTROPHILS NFR BLD AUTO: 70 % — SIGNIFICANT CHANGE UP (ref 43–77)
NEUTS BAND # BLD: 1 % — SIGNIFICANT CHANGE UP (ref 0–8)
NRBC # BLD: 4 /100 — HIGH (ref 0–0)
NRBC # BLD: SIGNIFICANT CHANGE UP /100 WBCS (ref 0–0)
PLAT MORPH BLD: NORMAL — SIGNIFICANT CHANGE UP
PLATELET # BLD AUTO: 204 K/UL — SIGNIFICANT CHANGE UP (ref 150–400)
RBC # BLD: 3.77 M/UL — LOW (ref 3.8–5.2)
RBC # FLD: 16.7 % — HIGH (ref 10.3–14.5)
RBC BLD AUTO: SIGNIFICANT CHANGE UP
WBC # BLD: 17.14 K/UL — HIGH (ref 3.8–10.5)
WBC # FLD AUTO: 17.14 K/UL — HIGH (ref 3.8–10.5)

## 2020-10-21 PROCEDURE — 99215 OFFICE O/P EST HI 40 MIN: CPT

## 2020-10-21 RX ORDER — DEXAMETHASONE 4 MG/1
4 TABLET ORAL TWICE DAILY
Qty: 30 | Refills: 0 | Status: DISCONTINUED | COMMUNITY
Start: 2020-08-29 | End: 2020-10-21

## 2020-10-21 NOTE — ASSESSMENT
[FreeTextEntry1] : She is a 59 y/o AAF Stage III left invasive lobular carcinoma who was on anastrozole while awaiting surgery s/p lumpectomy with axillary lymph node dissection in 5/5/2020 followed by mastectomy 6/30/2020 with additional lymph nodes removed. She has tolerated AC with expected fatigue, GI upset and constipation. We reviewed supportive measures to decrease fatigue and constipation. We reviewed mild anemia during chemotherapy and foods rich in B12 and iron to decrease symptoms. We reviewed leucocytosis due to Neulasta. \par \par We reviewed next steps: paclitaxel: reviewed premedication with dexamethasone (sent to [facility]) and Benadryl along with pepcid to decrease allergic reactions: back pain, itching, SOB, wheezing, and flushing. We reviewed weekly paclitaxel given her wish to be able to go to work. We reviewed weekly regimen usually with less arthralgias and myalgias. We reviewed neuropathy which can be persistent residual side effect and reviewed prevention: cold therapy during infusion and massage of hands and feet. Questions reviewed to her satisfaction. Work form completed.

## 2020-10-21 NOTE — CONSULT LETTER
[Dear  ___] : Dear  [unfilled], [Courtesy Letter:] : I had the pleasure of seeing your patient, [unfilled], in my office today. [Please see my note below.] : Please see my note below. [Consult Closing:] : Thank you very much for allowing me to participate in the care of this patient.  If you have any questions, please do not hesitate to contact me. [Sincerely,] : Sincerely, [FreeTextEntry2] : Heidy Hall MD\par [address] \[place] [address] [FreeTextEntry3] : Richard Lucia MD\par Attending\par [facility]\par  [DrCoral  ___] : Dr. WALLACE

## 2020-10-21 NOTE — PHYSICAL EXAM
[Restricted in physically strenuous activity but ambulatory and able to carry out work of a light or sedentary nature] : Status 1- Restricted in physically strenuous activity but ambulatory and able to carry out work of a light or sedentary nature, e.g., light house work, office work [Normal] : affect appropriate [de-identified] : B МАРИНА flap reconstruction with healed skin

## 2020-10-21 NOTE — HISTORY OF PRESENT ILLNESS
[Disease: _____________________] : Disease: [unfilled] [T: ___] : T[unfilled] [N: ___] : N[unfilled] [AJCC Stage: ____] : AJCC Stage: [unfilled] [de-identified] : Age 60: left axilla biopsy metastatic mammary carcinoma \par Screening mammogram done on 12/7/2019 which showed enlarged left axillary lymph node for which ultrasound guided biopsy was recommended and done on 1/21/2020. Pathology showed metastatic mammary carcinoma with lobular and signet ring cell features that is ER > 90%, [place] 0%, and Jkz2rkp negative. MRI of the breast done 2/13/2020 showed nonmass enhancement in the upper inner left breast measuring 5 cm in AP dimension and abnormal left axillary lymph node with biopsy clip. Also 0.2 cm focus enhancing in the right breast. She underwent MRI guided biopsy of the left breast done on 3/11/2020. We reviewed neoadjuvant chemotherapy which she refused and was on anastrozole while awaiting surgery which was delayed due to covid. She underwent left lumpectomy with sentinel lymph node followed by left axillary lymph node dissection with Dr Hall on 5/5/2020. The pathology showed invasive lobular carcinoma, classic type, Grade 2, invasive component was 2.3 cm and 5 out of 7 lymph nodes positive for carcinoma: T2N2 ER 90%, KS 0%, [id number] negative. We performed CT of the chest/ abd/ pelvis along with bone scan 5/22/2020 which showed indeterminate liver lesion. She had MRI of the abdomen 6/22/2020 done which showed hemangioma. She underwent prophylactic nipple sparing prophylactic right mastectomy and left mastectomy with additional lymph node removal on 6/30/2020 with Dr Hall. The pathology showed fibrocystic changes in the right prophylactic mastectomy and negative sentinel lymph node. The left mastectomy showed LCIS along with 5 additional positive lymph nodes.  [de-identified] : mammary carcinoma ER > 90%, NE 0%, Kvt3dxk negative  [de-identified] : dose dense AC 9/9/2020 to 10/21/2020 \par paclitaxel 11/4/2020 to  [de-identified] : Energy has been OK. She has chalky taste in mouth which is worse this week; has not tried any Pepcid or GERD medications. She has decreased appetite. No mouth sores. Has constipation and taking prune juice with Miralax with some effect. She is wanting to extend her work disability for another week to 11/6/2020 and feels she would be able to go to office few days a week on weekly paclitaxel. Wanting to review medication and expectations. She will be proceeding to paclitaxel in 2 weeks.

## 2020-10-21 NOTE — REVIEW OF SYSTEMS
[Fever] : no fever [Chills] : no chills [Night Sweats] : no night sweats [Fatigue] : fatigue [Recent Change In Weight] : ~T no recent weight change [Dysphagia] : no dysphagia [Abdominal Pain] : no abdominal pain [Vomiting] : no vomiting [Constipation] : constipation [Diarrhea] : no diarrhea [Negative] : Allergic/Immunologic [FreeTextEntry7] : chalky taste in mouth

## 2020-10-28 ENCOUNTER — OUTPATIENT (OUTPATIENT)
Dept: OUTPATIENT SERVICES | Facility: HOSPITAL | Age: 60
LOS: 1 days | Discharge: HOME | End: 2020-10-28

## 2020-10-28 ENCOUNTER — APPOINTMENT (OUTPATIENT)
Dept: INFUSION THERAPY | Facility: HOSPITAL | Age: 60
End: 2020-10-28

## 2020-10-28 DIAGNOSIS — Z90.13 ACQUIRED ABSENCE OF BILATERAL BREASTS AND NIPPLES: Chronic | ICD-10-CM

## 2020-10-28 DIAGNOSIS — Z98.890 OTHER SPECIFIED POSTPROCEDURAL STATES: Chronic | ICD-10-CM

## 2020-10-28 DIAGNOSIS — Z11.1 ENCOUNTER FOR SCREENING FOR RESPIRATORY TUBERCULOSIS: ICD-10-CM

## 2020-10-28 DIAGNOSIS — Z90.710 ACQUIRED ABSENCE OF BOTH CERVIX AND UTERUS: Chronic | ICD-10-CM

## 2020-11-04 ENCOUNTER — APPOINTMENT (OUTPATIENT)
Dept: HEMATOLOGY ONCOLOGY | Facility: CLINIC | Age: 60
End: 2020-11-04
Payer: COMMERCIAL

## 2020-11-04 ENCOUNTER — RESULT REVIEW (OUTPATIENT)
Age: 60
End: 2020-11-04

## 2020-11-04 ENCOUNTER — APPOINTMENT (OUTPATIENT)
Dept: HEMATOLOGY ONCOLOGY | Facility: CLINIC | Age: 60
End: 2020-11-04

## 2020-11-04 ENCOUNTER — LABORATORY RESULT (OUTPATIENT)
Age: 60
End: 2020-11-04

## 2020-11-04 ENCOUNTER — APPOINTMENT (OUTPATIENT)
Dept: INFUSION THERAPY | Facility: HOSPITAL | Age: 60
End: 2020-11-04

## 2020-11-04 VITALS
TEMPERATURE: 97.7 F | HEART RATE: 107 BPM | SYSTOLIC BLOOD PRESSURE: 134 MMHG | WEIGHT: 171.96 LBS | HEIGHT: 62.2 IN | BODY MASS INDEX: 31.24 KG/M2 | OXYGEN SATURATION: 97 % | RESPIRATION RATE: 16 BRPM | DIASTOLIC BLOOD PRESSURE: 74 MMHG

## 2020-11-04 DIAGNOSIS — C50.919 MALIGNANT NEOPLASM OF UNSPECIFIED SITE OF UNSPECIFIED FEMALE BREAST: ICD-10-CM

## 2020-11-04 LAB
BASOPHILS # BLD AUTO: 0 K/UL — SIGNIFICANT CHANGE UP (ref 0–0.2)
BASOPHILS NFR BLD AUTO: 0 % — SIGNIFICANT CHANGE UP (ref 0–2)
EOSINOPHIL # BLD AUTO: 0 K/UL — SIGNIFICANT CHANGE UP (ref 0–0.5)
EOSINOPHIL NFR BLD AUTO: 0 % — SIGNIFICANT CHANGE UP (ref 0–6)
HCT VFR BLD CALC: 29.9 % — LOW (ref 34.5–45)
HGB BLD-MCNC: 9.3 G/DL — LOW (ref 11.5–15.5)
LYMPHOCYTES # BLD AUTO: 0.16 K/UL — LOW (ref 1–3.3)
LYMPHOCYTES # BLD AUTO: 13.7 % — SIGNIFICANT CHANGE UP (ref 13–44)
MCHC RBC-ENTMCNC: 24.2 PG — LOW (ref 27–34)
MCHC RBC-ENTMCNC: 31.1 G/DL — LOW (ref 32–36)
MCV RBC AUTO: 77.9 FL — LOW (ref 80–100)
MONOCYTES # BLD AUTO: 0.08 K/UL — SIGNIFICANT CHANGE UP (ref 0–0.9)
MONOCYTES NFR BLD AUTO: 6.9 % — SIGNIFICANT CHANGE UP (ref 2–14)
NEUTROPHILS # BLD AUTO: 0.91 K/UL — LOW (ref 1.8–7.4)
NEUTROPHILS NFR BLD AUTO: 79.4 % — HIGH (ref 43–77)
NRBC # BLD: 0 /100 — SIGNIFICANT CHANGE UP (ref 0–0)
NRBC # BLD: SIGNIFICANT CHANGE UP /100 WBCS (ref 0–0)
PLAT MORPH BLD: NORMAL — SIGNIFICANT CHANGE UP
PLATELET # BLD AUTO: 154 K/UL — SIGNIFICANT CHANGE UP (ref 150–400)
RBC # BLD: 3.84 M/UL — SIGNIFICANT CHANGE UP (ref 3.8–5.2)
RBC # FLD: 17.6 % — HIGH (ref 10.3–14.5)
RBC BLD AUTO: SIGNIFICANT CHANGE UP
WBC # BLD: 1.15 K/UL — LOW (ref 3.8–10.5)
WBC # FLD AUTO: 1.15 K/UL — LOW (ref 3.8–10.5)

## 2020-11-04 PROCEDURE — 99214 OFFICE O/P EST MOD 30 MIN: CPT

## 2020-11-04 PROCEDURE — 99072 ADDL SUPL MATRL&STAF TM PHE: CPT

## 2020-11-04 NOTE — REVIEW OF SYSTEMS
[Fever] : no fever [Chills] : no chills [Night Sweats] : no night sweats [Recent Change In Weight] : ~T no recent weight change [Joint Stiffness] : no joint stiffness [Muscle Pain] : no muscle pain [Muscle Weakness] : no muscle weakness

## 2020-11-04 NOTE — ASSESSMENT
[FreeTextEntry1] : She is a 61 y/o AAF Stage III left invasive lobular carcinoma who was on anastrozole while awaiting surgery s/p lumpectomy with axillary lymph node dissection in 5/5/2020 followed by mastectomy 6/30/2020 with additional lymph nodes removed. She has tolerated AC dose dense x 4 and will proceed with paclitaxel today. We reviewed paclitaxel premedication and potential for allergic reaction. We reviewed myalgias and neuropathy. Questions answered to her satisfaction. Reviewed schedule and expectations.

## 2020-11-04 NOTE — HISTORY OF PRESENT ILLNESS
[de-identified] : Age 60: left axilla biopsy metastatic mammary carcinoma \par Screening mammogram done on 12/7/2019 which showed enlarged left axillary lymph node for which ultrasound guided biopsy was recommended and done on 1/21/2020. Pathology showed metastatic mammary carcinoma with lobular and signet ring cell features that is ER > 90%, LA 0%, and Xnr6cgz negative. MRI of the breast done 2/13/2020 showed nonmass enhancement in the upper inner left breast measuring 5 cm in AP dimension and abnormal left axillary lymph node with biopsy clip. Also 0.2 cm focus enhancing in the right breast. She underwent MRI guided biopsy of the left breast done on 3/11/2020. We reviewed neoadjuvant chemotherapy which she refused and was on anastrozole while awaiting surgery which was delayed due to covid. She underwent left lumpectomy with sentinel lymph node followed by left axillary lymph node dissection with Dr Hall on 5/5/2020. The pathology showed invasive lobular carcinoma, classic type, Grade 2, invasive component was 2.3 cm and 5 out of 7 lymph nodes positive for carcinoma: T2N2 ER 90%, LA 0%, Ltz8agn negative. We performed CT of the chest/ abd/ pelvis along with bone scan 5/22/2020 which showed indeterminate liver lesion. She had MRI of the abdomen 6/22/2020 done which showed hemangioma. She underwent prophylactic nipple sparing prophylactic right mastectomy and left mastectomy with additional lymph node removal on 6/30/2020 with Dr Hall. The pathology showed fibrocystic changes in the right prophylactic mastectomy and negative sentinel lymph node. The left mastectomy showed LCIS along with 5 additional positive lymph nodes.  [de-identified] : mammary carcinoma ER > 90%, AR 0%, Eks6ahn negative  [de-identified] : dose dense AC 9/9/2020 to 10/21/2020 \par paclitaxel 11/4/2020 to  [de-identified] : She completed AC and tolerating therapy. She is nervous about starting paclitaxel today. She took the premedication: dexamethasone and has brought the cold ice packs to use during paclitaxel infusion. She has some questions prior to the treatment today.

## 2020-11-05 ENCOUNTER — NON-APPOINTMENT (OUTPATIENT)
Age: 60
End: 2020-11-05

## 2020-11-11 ENCOUNTER — RESULT REVIEW (OUTPATIENT)
Age: 60
End: 2020-11-11

## 2020-11-11 ENCOUNTER — APPOINTMENT (OUTPATIENT)
Dept: INFUSION THERAPY | Facility: HOSPITAL | Age: 60
End: 2020-11-11

## 2020-11-11 ENCOUNTER — APPOINTMENT (OUTPATIENT)
Dept: HEMATOLOGY ONCOLOGY | Facility: CLINIC | Age: 60
End: 2020-11-11
Payer: COMMERCIAL

## 2020-11-11 VITALS
BODY MASS INDEX: 30.45 KG/M2 | SYSTOLIC BLOOD PRESSURE: 106 MMHG | WEIGHT: 167.55 LBS | RESPIRATION RATE: 14 BRPM | HEART RATE: 76 BPM | TEMPERATURE: 98 F | OXYGEN SATURATION: 99 % | DIASTOLIC BLOOD PRESSURE: 72 MMHG

## 2020-11-11 DIAGNOSIS — R11.2 NAUSEA WITH VOMITING, UNSPECIFIED: ICD-10-CM

## 2020-11-11 DIAGNOSIS — Z51.11 ENCOUNTER FOR ANTINEOPLASTIC CHEMOTHERAPY: ICD-10-CM

## 2020-11-11 DIAGNOSIS — C50.919 MALIGNANT NEOPLASM OF UNSPECIFIED SITE OF UNSPECIFIED FEMALE BREAST: ICD-10-CM

## 2020-11-11 LAB
BASOPHILS # BLD AUTO: 0.07 K/UL — SIGNIFICANT CHANGE UP (ref 0–0.2)
BASOPHILS NFR BLD AUTO: 1 % — SIGNIFICANT CHANGE UP (ref 0–2)
EOSINOPHIL # BLD AUTO: 0 K/UL — SIGNIFICANT CHANGE UP (ref 0–0.5)
EOSINOPHIL NFR BLD AUTO: 0 % — SIGNIFICANT CHANGE UP (ref 0–6)
HCT VFR BLD CALC: 29.9 % — LOW (ref 34.5–45)
HGB BLD-MCNC: 9.6 G/DL — LOW (ref 11.5–15.5)
LYMPHOCYTES # BLD AUTO: 1.18 K/UL — SIGNIFICANT CHANGE UP (ref 1–3.3)
LYMPHOCYTES # BLD AUTO: 18 % — SIGNIFICANT CHANGE UP (ref 13–44)
MCHC RBC-ENTMCNC: 25 PG — LOW (ref 27–34)
MCHC RBC-ENTMCNC: 32.1 G/DL — SIGNIFICANT CHANGE UP (ref 32–36)
MCV RBC AUTO: 77.9 FL — LOW (ref 80–100)
MONOCYTES # BLD AUTO: 1.57 K/UL — HIGH (ref 0–0.9)
MONOCYTES NFR BLD AUTO: 24 % — HIGH (ref 2–14)
NEUTROPHILS # BLD AUTO: 3.72 K/UL — SIGNIFICANT CHANGE UP (ref 1.8–7.4)
NEUTROPHILS NFR BLD AUTO: 57 % — SIGNIFICANT CHANGE UP (ref 43–77)
NRBC # BLD: 0 /100 — SIGNIFICANT CHANGE UP (ref 0–0)
NRBC # BLD: SIGNIFICANT CHANGE UP /100 WBCS (ref 0–0)
PLAT MORPH BLD: NORMAL — SIGNIFICANT CHANGE UP
PLATELET # BLD AUTO: 378 K/UL — SIGNIFICANT CHANGE UP (ref 150–400)
RBC # BLD: 3.84 M/UL — SIGNIFICANT CHANGE UP (ref 3.8–5.2)
RBC # FLD: 19.9 % — HIGH (ref 10.3–14.5)
RBC BLD AUTO: SIGNIFICANT CHANGE UP
WBC # BLD: 6.53 K/UL — SIGNIFICANT CHANGE UP (ref 3.8–10.5)
WBC # FLD AUTO: 6.53 K/UL — SIGNIFICANT CHANGE UP (ref 3.8–10.5)

## 2020-11-11 PROCEDURE — 99214 OFFICE O/P EST MOD 30 MIN: CPT

## 2020-11-11 PROCEDURE — 99072 ADDL SUPL MATRL&STAF TM PHE: CPT

## 2020-11-11 RX ORDER — APREPITANT 80 MG/1
80 CAPSULE ORAL DAILY
Qty: 2 | Refills: 0 | Status: DISCONTINUED | COMMUNITY
Start: 2020-08-29 | End: 2020-11-11

## 2020-11-18 ENCOUNTER — RESULT REVIEW (OUTPATIENT)
Age: 60
End: 2020-11-18

## 2020-11-18 ENCOUNTER — NON-APPOINTMENT (OUTPATIENT)
Age: 60
End: 2020-11-18

## 2020-11-18 ENCOUNTER — APPOINTMENT (OUTPATIENT)
Dept: INFUSION THERAPY | Facility: HOSPITAL | Age: 60
End: 2020-11-18

## 2020-11-18 LAB
BASOPHILS # BLD AUTO: 0.03 K/UL — SIGNIFICANT CHANGE UP (ref 0–0.2)
BASOPHILS NFR BLD AUTO: 0.5 % — SIGNIFICANT CHANGE UP (ref 0–2)
EOSINOPHIL # BLD AUTO: 0.04 K/UL — SIGNIFICANT CHANGE UP (ref 0–0.5)
EOSINOPHIL NFR BLD AUTO: 0.7 % — SIGNIFICANT CHANGE UP (ref 0–6)
HCT VFR BLD CALC: 29.5 % — LOW (ref 34.5–45)
HGB BLD-MCNC: 9.3 G/DL — LOW (ref 11.5–15.5)
IMM GRANULOCYTES NFR BLD AUTO: 1.7 % — HIGH (ref 0–1.5)
LYMPHOCYTES # BLD AUTO: 1.06 K/UL — SIGNIFICANT CHANGE UP (ref 1–3.3)
LYMPHOCYTES # BLD AUTO: 17.5 % — SIGNIFICANT CHANGE UP (ref 13–44)
MCHC RBC-ENTMCNC: 24.8 PG — LOW (ref 27–34)
MCHC RBC-ENTMCNC: 31.5 G/DL — LOW (ref 32–36)
MCV RBC AUTO: 78.7 FL — LOW (ref 80–100)
MONOCYTES # BLD AUTO: 0.66 K/UL — SIGNIFICANT CHANGE UP (ref 0–0.9)
MONOCYTES NFR BLD AUTO: 10.9 % — SIGNIFICANT CHANGE UP (ref 2–14)
NEUTROPHILS # BLD AUTO: 4.16 K/UL — SIGNIFICANT CHANGE UP (ref 1.8–7.4)
NEUTROPHILS NFR BLD AUTO: 68.7 % — SIGNIFICANT CHANGE UP (ref 43–77)
NRBC # BLD: 0 /100 WBCS — SIGNIFICANT CHANGE UP (ref 0–0)
PLATELET # BLD AUTO: 321 K/UL — SIGNIFICANT CHANGE UP (ref 150–400)
RBC # BLD: 3.75 M/UL — LOW (ref 3.8–5.2)
RBC # FLD: 19.8 % — HIGH (ref 10.3–14.5)
WBC # BLD: 6.05 K/UL — SIGNIFICANT CHANGE UP (ref 3.8–10.5)
WBC # FLD AUTO: 6.05 K/UL — SIGNIFICANT CHANGE UP (ref 3.8–10.5)

## 2020-11-19 ENCOUNTER — NON-APPOINTMENT (OUTPATIENT)
Age: 60
End: 2020-11-19

## 2020-11-19 NOTE — END OF VISIT
[Time Spent: ___ minutes] : I have spent [unfilled] minutes of time on the encounter. [>50% of the face to face encounter time was spent on counseling and/or coordination of care for ___] : Greater than 50% of the face to face encounter time was spent on counseling and/or coordination of care for [unfilled] [FreeTextEntry3] : seen &  d/w

## 2020-11-19 NOTE — PHYSICAL EXAM
[Restricted in physically strenuous activity but ambulatory and able to carry out work of a light or sedentary nature] : Status 1- Restricted in physically strenuous activity but ambulatory and able to carry out work of a light or sedentary nature, e.g., light house work, office work [Normal] : affect appropriate [de-identified] : B МАРИНА flap reconstruction with healed skin

## 2020-11-19 NOTE — HISTORY OF PRESENT ILLNESS
[Disease: _____________________] : Disease: [unfilled] [T: ___] : T[unfilled] [N: ___] : N[unfilled] [AJCC Stage: ____] : AJCC Stage: [unfilled] [de-identified] : Age 60: left axilla biopsy metastatic mammary carcinoma \par Screening mammogram done on 12/7/2019 which showed enlarged left axillary lymph node for which ultrasound guided biopsy was recommended and done on 1/21/2020. Pathology showed metastatic mammary carcinoma with lobular and signet ring cell features that is ER > 90%, MS 0%, and Gxv0eoe negative. MRI of the breast done 2/13/2020 showed nonmass enhancement in the upper inner left breast measuring 5 cm in AP dimension and abnormal left axillary lymph node with biopsy clip. Also 0.2 cm focus enhancing in the right breast. She underwent MRI guided biopsy of the left breast done on 3/11/2020. We reviewed neoadjuvant chemotherapy which she refused and was on anastrozole while awaiting surgery which was delayed due to covid. She underwent left lumpectomy with sentinel lymph node followed by left axillary lymph node dissection with Dr Hall on 5/5/2020. The pathology showed invasive lobular carcinoma, classic type, Grade 2, invasive component was 2.3 cm and 5 out of 7 lymph nodes positive for carcinoma: T2N2 ER 90%, MS 0%, Aoo6efv negative. We performed CT of the chest/ abd/ pelvis along with bone scan 5/22/2020 which showed indeterminate liver lesion. She had MRI of the abdomen 6/22/2020 done which showed hemangioma. She underwent prophylactic nipple sparing prophylactic right mastectomy and left mastectomy with additional lymph node removal on 6/30/2020 with Dr Hall. The pathology showed fibrocystic changes in the right prophylactic mastectomy and negative sentinel lymph node. The left mastectomy showed LCIS along with 5 additional positive lymph nodes.  [de-identified] : mammary carcinoma ER > 90%, MS 0%, Nme9zoe negative  [de-identified] : dose dense AC 9/9/2020 to 10/21/2020 \par paclitaxel 11/4/2020 to  [de-identified] : Completed 4 cycles of doxorubicin/cyclophosphamide with onpro and was scheduled to start weekly paclitaxel on 11/4/20. Treatment held secondary to neutropenia. Here today to start taxol. Feels well overall. Forgot to take the prescribed dexamethasone last night and this AM. Notes a good appetite, stable weight and a stable performance status.

## 2020-11-19 NOTE — REVIEW OF SYSTEMS
[Fatigue] : fatigue [Joint Pain] : joint pain [Negative] : Allergic/Immunologic [Fever] : no fever [Chills] : no chills [Night Sweats] : no night sweats [Recent Change In Weight] : ~T no recent weight change [Joint Stiffness] : no joint stiffness [Muscle Pain] : no muscle pain [Muscle Weakness] : no muscle weakness

## 2020-11-25 ENCOUNTER — RESULT REVIEW (OUTPATIENT)
Age: 60
End: 2020-11-25

## 2020-11-25 ENCOUNTER — APPOINTMENT (OUTPATIENT)
Dept: HEMATOLOGY ONCOLOGY | Facility: CLINIC | Age: 60
End: 2020-11-25
Payer: COMMERCIAL

## 2020-11-25 ENCOUNTER — APPOINTMENT (OUTPATIENT)
Dept: INFUSION THERAPY | Facility: HOSPITAL | Age: 60
End: 2020-11-25

## 2020-11-25 VITALS
WEIGHT: 168.65 LBS | OXYGEN SATURATION: 99 % | RESPIRATION RATE: 16 BRPM | HEART RATE: 102 BPM | TEMPERATURE: 96.4 F | SYSTOLIC BLOOD PRESSURE: 124 MMHG | DIASTOLIC BLOOD PRESSURE: 87 MMHG | BODY MASS INDEX: 30.65 KG/M2

## 2020-11-25 DIAGNOSIS — D64.9 ANEMIA, UNSPECIFIED: ICD-10-CM

## 2020-11-25 DIAGNOSIS — M25.50 PAIN IN UNSPECIFIED JOINT: ICD-10-CM

## 2020-11-25 DIAGNOSIS — S21.002D UNSPECIFIED OPEN WOUND OF LEFT BREAST, SUBSEQUENT ENCOUNTER: ICD-10-CM

## 2020-11-25 DIAGNOSIS — S21.001A UNSPECIFIED OPEN WOUND OF RIGHT BREAST, INITIAL ENCOUNTER: ICD-10-CM

## 2020-11-25 DIAGNOSIS — S21.001D UNSPECIFIED OPEN WOUND OF RIGHT BREAST, SUBSEQUENT ENCOUNTER: ICD-10-CM

## 2020-11-25 LAB
BASOPHILS # BLD AUTO: 0.01 K/UL — SIGNIFICANT CHANGE UP (ref 0–0.2)
BASOPHILS NFR BLD AUTO: 0.3 % — SIGNIFICANT CHANGE UP (ref 0–2)
EOSINOPHIL # BLD AUTO: 0.08 K/UL — SIGNIFICANT CHANGE UP (ref 0–0.5)
EOSINOPHIL NFR BLD AUTO: 2.4 % — SIGNIFICANT CHANGE UP (ref 0–6)
HCT VFR BLD CALC: 28.7 % — LOW (ref 34.5–45)
HGB BLD-MCNC: 9.4 G/DL — LOW (ref 11.5–15.5)
IMM GRANULOCYTES NFR BLD AUTO: 1.2 % — SIGNIFICANT CHANGE UP (ref 0–1.5)
LYMPHOCYTES # BLD AUTO: 0.94 K/UL — LOW (ref 1–3.3)
LYMPHOCYTES # BLD AUTO: 27.8 % — SIGNIFICANT CHANGE UP (ref 13–44)
MCHC RBC-ENTMCNC: 25.7 PG — LOW (ref 27–34)
MCHC RBC-ENTMCNC: 32.8 G/DL — SIGNIFICANT CHANGE UP (ref 32–36)
MCV RBC AUTO: 78.4 FL — LOW (ref 80–100)
MONOCYTES # BLD AUTO: 0.35 K/UL — SIGNIFICANT CHANGE UP (ref 0–0.9)
MONOCYTES NFR BLD AUTO: 10.4 % — SIGNIFICANT CHANGE UP (ref 2–14)
NEUTROPHILS # BLD AUTO: 1.96 K/UL — SIGNIFICANT CHANGE UP (ref 1.8–7.4)
NEUTROPHILS NFR BLD AUTO: 57.9 % — SIGNIFICANT CHANGE UP (ref 43–77)
NRBC # BLD: 0 /100 WBCS — SIGNIFICANT CHANGE UP (ref 0–0)
PLATELET # BLD AUTO: 219 K/UL — SIGNIFICANT CHANGE UP (ref 150–400)
RBC # BLD: 3.66 M/UL — LOW (ref 3.8–5.2)
RBC # FLD: 20.4 % — HIGH (ref 10.3–14.5)
WBC # BLD: 3.38 K/UL — LOW (ref 3.8–10.5)
WBC # FLD AUTO: 3.38 K/UL — LOW (ref 3.8–10.5)

## 2020-11-25 PROCEDURE — 99214 OFFICE O/P EST MOD 30 MIN: CPT

## 2020-11-25 RX ORDER — DEXAMETHASONE 4 MG/1
4 TABLET ORAL
Qty: 10 | Refills: 0 | Status: DISCONTINUED | COMMUNITY
Start: 2020-10-21 | End: 2020-11-25

## 2020-11-25 NOTE — END OF VISIT
Detail Level: Generalized [Time Spent: ___ minutes] : I have spent [unfilled] minutes of time on the encounter. [>50% of the face to face encounter time was spent on counseling and/or coordination of care for ___] : Greater than 50% of the face to face encounter time was spent on counseling and/or coordination of care for [unfilled] Detail Level: Simple Detail Level: Detailed

## 2020-11-25 NOTE — PHYSICAL EXAM
[Restricted in physically strenuous activity but ambulatory and able to carry out work of a light or sedentary nature] : Status 1- Restricted in physically strenuous activity but ambulatory and able to carry out work of a light or sedentary nature, e.g., light house work, office work [Normal] : affect appropriate [de-identified] : B МАРИНА flap reconstruction without any palpable axillary lymph nodes

## 2020-11-25 NOTE — HISTORY OF PRESENT ILLNESS
[Disease: _____________________] : Disease: [unfilled] [T: ___] : T[unfilled] [N: ___] : N[unfilled] [AJCC Stage: ____] : AJCC Stage: [unfilled] [de-identified] : Age 60: left axilla biopsy metastatic mammary carcinoma \par Screening mammogram done on 12/7/2019 which showed enlarged left axillary lymph node for which ultrasound guided biopsy was recommended and done on 1/21/2020. Pathology showed metastatic mammary carcinoma with lobular and signet ring cell features that is ER > 90%, [place] 0%, and Uib5bmx negative. MRI of the breast done 2/13/2020 showed nonmass enhancement in the upper inner left breast measuring 5 cm in AP dimension and abnormal left axillary lymph node with biopsy clip. Also 0.2 cm focus enhancing in the right breast. She underwent MRI guided biopsy of the left breast done on 3/11/2020. We reviewed neoadjuvant chemotherapy which she refused and was on anastrozole while awaiting surgery which was delayed due to covid. She underwent left lumpectomy with sentinel lymph node followed by left axillary lymph node dissection with Dr Hall on 5/5/2020. The pathology showed invasive lobular carcinoma, classic type, Grade 2, invasive component was 2.3 cm and 5 out of 7 lymph nodes positive for carcinoma: T2N2 ER 90%, ME 0%, Zjb6cti negative. We performed CT of the chest/ abd/ pelvis along with bone scan 5/22/2020 which showed indeterminate liver lesion. She had MRI of the abdomen 6/22/2020 done which showed hemangioma. She underwent prophylactic nipple sparing prophylactic right mastectomy and left mastectomy with additional lymph node removal on 6/30/2020 with Dr Hall. The pathology showed fibrocystic changes in the right prophylactic mastectomy and negative sentinel lymph node. The left mastectomy showed LCIS along with 5 additional positive lymph nodes.  [de-identified] : mammary carcinoma ER > 90%, [place] 0%, Qhg4nhk negative  [de-identified] : dose dense AC 9/9/2020 to 10/21/2020 \par paclitaxel 11/4/2020 to present  [de-identified] : She has been tolerating paclitaxel: denies any numbness or tingling of the hands or feet. Denies any nausea. Has fatigue but able to work. She denies any fevers or chills. She is eating normally. Her dentist put in implant posts for teeth on the left lower palate and she is taking amoxicillin for this week. No issues with posts.

## 2020-11-25 NOTE — REVIEW OF SYSTEMS
[Fever] : no fever [Chills] : no chills [Night Sweats] : no night sweats [Fatigue] : fatigue [Recent Change In Weight] : ~T no recent weight change [Joint Pain] : no joint pain [Joint Stiffness] : no joint stiffness [Muscle Pain] : muscle pain [Muscle Weakness] : no muscle weakness [Negative] : Allergic/Immunologic [FreeTextEntry9] : achiness over the hands

## 2020-11-25 NOTE — REASON FOR VISIT
[Follow-Up Visit] : a follow-up [FreeTextEntry2] : follow up for breast cancer on paclitaxel weekly: Week 3 today

## 2020-11-25 NOTE — ASSESSMENT
[FreeTextEntry1] : She is a 61 y/o AAF Stage III left invasive lobular carcinoma who was on anastrozole while awaiting surgery s/p lumpectomy with axillary lymph node dissection in 5/5/2020 followed by mastectomy 6/30/2020 with additional lymph nodes removed. She has tolerated AC dose dense x 4 and has been on paclitaxel: week 3 today. We reviewed cumulative side effects of paclitaxel and supportive measures. We reviewed exercise for achiness of the hands. We reviewed numbness and tingling of the hands and feet. We reviewed her counts and reviewed foods rich in B12 and iron to maintain counts and may need treatment delay if ANC is below 1 K/microL. She understands potential infection on chemotherapy and if any problems with dental posts, she will let us or her dentist know. She will continue with treatment weekly. Next follow up in 2 weeks.

## 2020-11-28 ENCOUNTER — OUTPATIENT (OUTPATIENT)
Dept: OUTPATIENT SERVICES | Facility: HOSPITAL | Age: 60
LOS: 1 days | Discharge: HOME | End: 2020-11-28

## 2020-11-28 DIAGNOSIS — Z98.890 OTHER SPECIFIED POSTPROCEDURAL STATES: Chronic | ICD-10-CM

## 2020-11-28 DIAGNOSIS — Z90.13 ACQUIRED ABSENCE OF BILATERAL BREASTS AND NIPPLES: Chronic | ICD-10-CM

## 2020-11-28 DIAGNOSIS — Z11.1 ENCOUNTER FOR SCREENING FOR RESPIRATORY TUBERCULOSIS: ICD-10-CM

## 2020-11-28 DIAGNOSIS — Z90.710 ACQUIRED ABSENCE OF BOTH CERVIX AND UTERUS: Chronic | ICD-10-CM

## 2020-12-02 ENCOUNTER — RESULT REVIEW (OUTPATIENT)
Age: 60
End: 2020-12-02

## 2020-12-02 ENCOUNTER — APPOINTMENT (OUTPATIENT)
Dept: INFUSION THERAPY | Facility: HOSPITAL | Age: 60
End: 2020-12-02

## 2020-12-02 DIAGNOSIS — R11.2 NAUSEA WITH VOMITING, UNSPECIFIED: ICD-10-CM

## 2020-12-02 DIAGNOSIS — C50.919 MALIGNANT NEOPLASM OF UNSPECIFIED SITE OF UNSPECIFIED FEMALE BREAST: ICD-10-CM

## 2020-12-02 DIAGNOSIS — Z51.11 ENCOUNTER FOR ANTINEOPLASTIC CHEMOTHERAPY: ICD-10-CM

## 2020-12-02 LAB
ANISOCYTOSIS BLD QL: SLIGHT — SIGNIFICANT CHANGE UP
BASOPHILS # BLD AUTO: 0 K/UL — SIGNIFICANT CHANGE UP (ref 0–0.2)
BASOPHILS NFR BLD AUTO: 0 % — SIGNIFICANT CHANGE UP (ref 0–2)
EOSINOPHIL # BLD AUTO: 0 K/UL — SIGNIFICANT CHANGE UP (ref 0–0.5)
EOSINOPHIL NFR BLD AUTO: 0 % — SIGNIFICANT CHANGE UP (ref 0–6)
HCT VFR BLD CALC: 28 % — LOW (ref 34.5–45)
HGB BLD-MCNC: 9.2 G/DL — LOW (ref 11.5–15.5)
LYMPHOCYTES # BLD AUTO: 1.01 K/UL — SIGNIFICANT CHANGE UP (ref 1–3.3)
LYMPHOCYTES # BLD AUTO: 37 % — SIGNIFICANT CHANGE UP (ref 13–44)
MCHC RBC-ENTMCNC: 26.6 PG — LOW (ref 27–34)
MCHC RBC-ENTMCNC: 32.9 G/DL — SIGNIFICANT CHANGE UP (ref 32–36)
MCV RBC AUTO: 80.9 FL — SIGNIFICANT CHANGE UP (ref 80–100)
MONOCYTES # BLD AUTO: 0.3 K/UL — SIGNIFICANT CHANGE UP (ref 0–0.9)
MONOCYTES NFR BLD AUTO: 11 % — SIGNIFICANT CHANGE UP (ref 2–14)
MYELOCYTES NFR BLD: 1 % — HIGH (ref 0–0)
NEUTROPHILS # BLD AUTO: 1.39 K/UL — LOW (ref 1.8–7.4)
NEUTROPHILS NFR BLD AUTO: 51 % — SIGNIFICANT CHANGE UP (ref 43–77)
NRBC # BLD: 0 /100 — SIGNIFICANT CHANGE UP (ref 0–0)
NRBC # BLD: SIGNIFICANT CHANGE UP /100 WBCS (ref 0–0)
PLAT MORPH BLD: NORMAL — SIGNIFICANT CHANGE UP
PLATELET # BLD AUTO: 263 K/UL — SIGNIFICANT CHANGE UP (ref 150–400)
POIKILOCYTOSIS BLD QL AUTO: SLIGHT — SIGNIFICANT CHANGE UP
RBC # BLD: 3.46 M/UL — LOW (ref 3.8–5.2)
RBC # FLD: 20.8 % — HIGH (ref 10.3–14.5)
RBC BLD AUTO: SIGNIFICANT CHANGE UP
WBC # BLD: 2.72 K/UL — LOW (ref 3.8–10.5)
WBC # FLD AUTO: 2.72 K/UL — LOW (ref 3.8–10.5)

## 2020-12-07 ENCOUNTER — APPOINTMENT (OUTPATIENT)
Dept: INFUSION THERAPY | Facility: HOSPITAL | Age: 60
End: 2020-12-07

## 2020-12-09 ENCOUNTER — RESULT REVIEW (OUTPATIENT)
Age: 60
End: 2020-12-09

## 2020-12-09 ENCOUNTER — APPOINTMENT (OUTPATIENT)
Dept: HEMATOLOGY ONCOLOGY | Facility: CLINIC | Age: 60
End: 2020-12-09
Payer: COMMERCIAL

## 2020-12-09 ENCOUNTER — APPOINTMENT (OUTPATIENT)
Dept: INFUSION THERAPY | Facility: HOSPITAL | Age: 60
End: 2020-12-09

## 2020-12-09 VITALS
HEART RATE: 80 BPM | BODY MASS INDEX: 30.64 KG/M2 | RESPIRATION RATE: 17 BRPM | OXYGEN SATURATION: 98 % | HEIGHT: 62.2 IN | WEIGHT: 168.65 LBS | SYSTOLIC BLOOD PRESSURE: 115 MMHG | DIASTOLIC BLOOD PRESSURE: 74 MMHG | TEMPERATURE: 97.4 F

## 2020-12-09 LAB
BASOPHILS # BLD AUTO: 0 K/UL — SIGNIFICANT CHANGE UP (ref 0–0.2)
BASOPHILS NFR BLD AUTO: 0 % — SIGNIFICANT CHANGE UP (ref 0–2)
EOSINOPHIL # BLD AUTO: 0.02 K/UL — SIGNIFICANT CHANGE UP (ref 0–0.5)
EOSINOPHIL NFR BLD AUTO: 1 % — SIGNIFICANT CHANGE UP (ref 0–6)
HCT VFR BLD CALC: 28.4 % — LOW (ref 34.5–45)
HGB BLD-MCNC: 9 G/DL — LOW (ref 11.5–15.5)
LYMPHOCYTES # BLD AUTO: 1.08 K/UL — SIGNIFICANT CHANGE UP (ref 1–3.3)
LYMPHOCYTES # BLD AUTO: 48 % — HIGH (ref 13–44)
MCHC RBC-ENTMCNC: 26.8 PG — LOW (ref 27–34)
MCHC RBC-ENTMCNC: 31.7 G/DL — LOW (ref 32–36)
MCV RBC AUTO: 84.5 FL — SIGNIFICANT CHANGE UP (ref 80–100)
MONOCYTES # BLD AUTO: 0.18 K/UL — SIGNIFICANT CHANGE UP (ref 0–0.9)
MONOCYTES NFR BLD AUTO: 8 % — SIGNIFICANT CHANGE UP (ref 2–14)
NEUTROPHILS # BLD AUTO: 0.97 K/UL — LOW (ref 1.8–7.4)
NEUTROPHILS NFR BLD AUTO: 43 % — SIGNIFICANT CHANGE UP (ref 43–77)
NRBC # BLD: 1 /100 — HIGH (ref 0–0)
NRBC # BLD: SIGNIFICANT CHANGE UP /100 WBCS (ref 0–0)
PLAT MORPH BLD: NORMAL — SIGNIFICANT CHANGE UP
PLATELET # BLD AUTO: 269 K/UL — SIGNIFICANT CHANGE UP (ref 150–400)
RBC # BLD: 3.36 M/UL — LOW (ref 3.8–5.2)
RBC # FLD: 21.2 % — HIGH (ref 10.3–14.5)
RBC BLD AUTO: SIGNIFICANT CHANGE UP
SARS-COV-2 N GENE NPH QL NAA+PROBE: NOT DETECTED
WBC # BLD: 2.26 K/UL — LOW (ref 3.8–10.5)
WBC # FLD AUTO: 2.26 K/UL — LOW (ref 3.8–10.5)

## 2020-12-09 PROCEDURE — 99214 OFFICE O/P EST MOD 30 MIN: CPT

## 2020-12-09 PROCEDURE — 99072 ADDL SUPL MATRL&STAF TM PHE: CPT

## 2020-12-10 NOTE — PHYSICAL EXAM
[Restricted in physically strenuous activity but ambulatory and able to carry out work of a light or sedentary nature] : Status 1- Restricted in physically strenuous activity but ambulatory and able to carry out work of a light or sedentary nature, e.g., light house work, office work [Normal] : affect appropriate [de-identified] : B МАРИНА flap reconstruction without any palpable axillary lymph nodes

## 2020-12-10 NOTE — ASSESSMENT
[FreeTextEntry1] : She is a 61 y/o AAF Stage III left invasive lobular carcinoma who was on anastrozole while awaiting surgery s/p lumpectomy with axillary lymph node dissection in 5/5/2020 followed by mastectomy 6/30/2020 with additional lymph nodes removed. She has tolerated AC dose dense x 4 and now on weekly paclitaxel 7/12 today. Tolerating well.\par - Mild ongoing fatigue - Toelrable\par - ANC today 0.97.  aware. Will proceed w treatment.\par To call us for any temp > 100.4F and/or shaking chills. has been on paclitaxel: week 3 today. May need to hold next cycle if ANC drifts down further. F/yp visit in 2 weeks, sooner if needed.

## 2020-12-10 NOTE — HISTORY OF PRESENT ILLNESS
[Disease: _____________________] : Disease: [unfilled] [T: ___] : T[unfilled] [N: ___] : N[unfilled] [AJCC Stage: ____] : AJCC Stage: [unfilled] [de-identified] : Age 60: left axilla biopsy metastatic mammary carcinoma \par Screening mammogram done on 12/7/2019 which showed enlarged left axillary lymph node for which ultrasound guided biopsy was recommended and done on 1/21/2020. Pathology showed metastatic mammary carcinoma with lobular and signet ring cell features that is ER > 90%, PA 0%, and Qgw4wzf negative. MRI of the breast done 2/13/2020 showed nonmass enhancement in the upper inner left breast measuring 5 cm in AP dimension and abnormal left axillary lymph node with biopsy clip. Also 0.2 cm focus enhancing in the right breast. She underwent MRI guided biopsy of the left breast done on 3/11/2020. We reviewed neoadjuvant chemotherapy which she refused and was on anastrozole while awaiting surgery which was delayed due to covid. She underwent left lumpectomy with sentinel lymph node followed by left axillary lymph node dissection with Dr Hall on 5/5/2020. The pathology showed invasive lobular carcinoma, classic type, Grade 2, invasive component was 2.3 cm and 5 out of 7 lymph nodes positive for carcinoma: T2N2 ER 90%, PA 0%, Cpz8vkl negative. We performed CT of the chest/ abd/ pelvis along with bone scan 5/22/2020 which showed indeterminate liver lesion. She had MRI of the abdomen 6/22/2020 done which showed hemangioma. She underwent prophylactic nipple sparing prophylactic right mastectomy and left mastectomy with additional lymph node removal on 6/30/2020 with Dr Hall. The pathology showed fibrocystic changes in the right prophylactic mastectomy and negative sentinel lymph node. The left mastectomy showed LCIS along with 5 additional positive lymph nodes.  [de-identified] : mammary carcinoma ER > 90%, MA 0%, Vhv4nkd negative  [de-identified] : dose dense AC 9/9/2020 to 10/21/2020 \par paclitaxel 11/4/2020 to present  [de-identified] : On weekly taxol. 7/12 today\par Mild ongoing fatigue\par Feels a little sad with the holidays. Mild ongoing fatigue.\par Denies any symptoms of peripheral neuropathy\par Notes a good appetite, stable weight, and a stable performance status.\par No new issues/concerns.

## 2020-12-10 NOTE — REASON FOR VISIT
[Follow-Up Visit] : a follow-up [FreeTextEntry2] : follow up for breast cancer on paclitaxel weekly: Week 7  today

## 2020-12-10 NOTE — REVIEW OF SYSTEMS
[Fatigue] : fatigue [Muscle Pain] : muscle pain [Negative] : Allergic/Immunologic [Fever] : no fever [Chills] : no chills [Night Sweats] : no night sweats [Recent Change In Weight] : ~T no recent weight change [Joint Pain] : no joint pain [Joint Stiffness] : no joint stiffness [Muscle Weakness] : no muscle weakness [FreeTextEntry9] : achiness over the hands

## 2020-12-16 ENCOUNTER — APPOINTMENT (OUTPATIENT)
Dept: INFUSION THERAPY | Facility: HOSPITAL | Age: 60
End: 2020-12-16

## 2020-12-16 ENCOUNTER — RESULT REVIEW (OUTPATIENT)
Age: 60
End: 2020-12-16

## 2020-12-16 ENCOUNTER — LABORATORY RESULT (OUTPATIENT)
Age: 60
End: 2020-12-16

## 2020-12-16 LAB
BASOPHILS # BLD AUTO: 0 K/UL — SIGNIFICANT CHANGE UP (ref 0–0.2)
BASOPHILS NFR BLD AUTO: 0 % — SIGNIFICANT CHANGE UP (ref 0–2)
EOSINOPHIL # BLD AUTO: 0 K/UL — SIGNIFICANT CHANGE UP (ref 0–0.5)
EOSINOPHIL NFR BLD AUTO: 0 % — SIGNIFICANT CHANGE UP (ref 0–6)
HCT VFR BLD CALC: 27.2 % — LOW (ref 34.5–45)
HGB BLD-MCNC: 8.8 G/DL — LOW (ref 11.5–15.5)
LYMPHOCYTES # BLD AUTO: 0.99 K/UL — LOW (ref 1–3.3)
LYMPHOCYTES # BLD AUTO: 45 % — HIGH (ref 13–44)
MCHC RBC-ENTMCNC: 27.7 PG — SIGNIFICANT CHANGE UP (ref 27–34)
MCHC RBC-ENTMCNC: 32.4 G/DL — SIGNIFICANT CHANGE UP (ref 32–36)
MCV RBC AUTO: 85.5 FL — SIGNIFICANT CHANGE UP (ref 80–100)
MONOCYTES # BLD AUTO: 0.2 K/UL — SIGNIFICANT CHANGE UP (ref 0–0.9)
MONOCYTES NFR BLD AUTO: 9 % — SIGNIFICANT CHANGE UP (ref 2–14)
NEUTROPHILS # BLD AUTO: 1.01 K/UL — LOW (ref 1.8–7.4)
NEUTROPHILS NFR BLD AUTO: 46 % — SIGNIFICANT CHANGE UP (ref 43–77)
NRBC # BLD: 0 /100 — SIGNIFICANT CHANGE UP (ref 0–0)
NRBC # BLD: SIGNIFICANT CHANGE UP /100 WBCS (ref 0–0)
PLAT MORPH BLD: NORMAL — SIGNIFICANT CHANGE UP
PLATELET # BLD AUTO: 251 K/UL — SIGNIFICANT CHANGE UP (ref 150–400)
RBC # BLD: 3.18 M/UL — LOW (ref 3.8–5.2)
RBC # FLD: 19.8 % — HIGH (ref 10.3–14.5)
RBC BLD AUTO: SIGNIFICANT CHANGE UP
WBC # BLD: 2.2 K/UL — LOW (ref 3.8–10.5)
WBC # FLD AUTO: 2.2 K/UL — LOW (ref 3.8–10.5)

## 2020-12-17 ENCOUNTER — NON-APPOINTMENT (OUTPATIENT)
Age: 60
End: 2020-12-17

## 2020-12-23 ENCOUNTER — RX RENEWAL (OUTPATIENT)
Age: 60
End: 2020-12-23

## 2020-12-23 ENCOUNTER — RESULT REVIEW (OUTPATIENT)
Age: 60
End: 2020-12-23

## 2020-12-23 ENCOUNTER — APPOINTMENT (OUTPATIENT)
Dept: HEMATOLOGY ONCOLOGY | Facility: CLINIC | Age: 60
End: 2020-12-23
Payer: COMMERCIAL

## 2020-12-23 ENCOUNTER — APPOINTMENT (OUTPATIENT)
Dept: INFUSION THERAPY | Facility: HOSPITAL | Age: 60
End: 2020-12-23

## 2020-12-23 VITALS
RESPIRATION RATE: 16 BRPM | OXYGEN SATURATION: 98 % | HEART RATE: 101 BPM | SYSTOLIC BLOOD PRESSURE: 117 MMHG | DIASTOLIC BLOOD PRESSURE: 70 MMHG | TEMPERATURE: 98.2 F | BODY MASS INDEX: 31.97 KG/M2 | HEIGHT: 62.2 IN | WEIGHT: 175.93 LBS

## 2020-12-23 LAB
BASOPHILS # BLD AUTO: 0 K/UL — SIGNIFICANT CHANGE UP (ref 0–0.2)
BASOPHILS NFR BLD AUTO: 0 % — SIGNIFICANT CHANGE UP (ref 0–2)
EOSINOPHIL # BLD AUTO: 0.02 K/UL — SIGNIFICANT CHANGE UP (ref 0–0.5)
EOSINOPHIL NFR BLD AUTO: 1 % — SIGNIFICANT CHANGE UP (ref 0–6)
HCT VFR BLD CALC: 28.9 % — LOW (ref 34.5–45)
HGB BLD-MCNC: 9.1 G/DL — LOW (ref 11.5–15.5)
LYMPHOCYTES # BLD AUTO: 1 K/UL — SIGNIFICANT CHANGE UP (ref 1–3.3)
LYMPHOCYTES # BLD AUTO: 50 % — HIGH (ref 13–44)
MCHC RBC-ENTMCNC: 27.7 PG — SIGNIFICANT CHANGE UP (ref 27–34)
MCHC RBC-ENTMCNC: 31.5 G/DL — LOW (ref 32–36)
MCV RBC AUTO: 87.8 FL — SIGNIFICANT CHANGE UP (ref 80–100)
MONOCYTES # BLD AUTO: 0.28 K/UL — SIGNIFICANT CHANGE UP (ref 0–0.9)
MONOCYTES NFR BLD AUTO: 14 % — SIGNIFICANT CHANGE UP (ref 2–14)
NEUTROPHILS # BLD AUTO: 0.7 K/UL — LOW (ref 1.8–7.4)
NEUTROPHILS NFR BLD AUTO: 35 % — LOW (ref 43–77)
NRBC # BLD: 0 /100 — SIGNIFICANT CHANGE UP (ref 0–0)
NRBC # BLD: SIGNIFICANT CHANGE UP /100 WBCS (ref 0–0)
PLAT MORPH BLD: NORMAL — SIGNIFICANT CHANGE UP
PLATELET # BLD AUTO: 291 K/UL — SIGNIFICANT CHANGE UP (ref 150–400)
RBC # BLD: 3.29 M/UL — LOW (ref 3.8–5.2)
RBC # FLD: 18.5 % — HIGH (ref 10.3–14.5)
RBC BLD AUTO: SIGNIFICANT CHANGE UP
WBC # BLD: 2 K/UL — LOW (ref 3.8–10.5)
WBC # FLD AUTO: 2 K/UL — LOW (ref 3.8–10.5)

## 2020-12-23 PROCEDURE — 99072 ADDL SUPL MATRL&STAF TM PHE: CPT

## 2020-12-23 PROCEDURE — 99214 OFFICE O/P EST MOD 30 MIN: CPT

## 2020-12-23 NOTE — REASON FOR VISIT
[Follow-Up Visit] : a follow-up [FreeTextEntry2] : follow up for breast cancer on weekly paclitaxel: Week 7

## 2020-12-23 NOTE — REVIEW OF SYSTEMS
[Fever] : no fever [Chills] : no chills [Night Sweats] : no night sweats [Fatigue] : fatigue [Recent Change In Weight] : ~T no recent weight change [Negative] : Allergic/Immunologic

## 2020-12-23 NOTE — PHYSICAL EXAM
[Restricted in physically strenuous activity but ambulatory and able to carry out work of a light or sedentary nature] : Status 1- Restricted in physically strenuous activity but ambulatory and able to carry out work of a light or sedentary nature, e.g., light house work, office work [Normal] : affect appropriate [de-identified] : B МАРИНА flap reconstruction without any palpable axillary lymph nodes

## 2020-12-23 NOTE — HISTORY OF PRESENT ILLNESS
[Disease: _____________________] : Disease: [unfilled] [T: ___] : T[unfilled] [N: ___] : N[unfilled] [AJCC Stage: ____] : AJCC Stage: [unfilled] [de-identified] : Age 60: left axilla biopsy metastatic mammary carcinoma \par Screening mammogram done on 12/7/2019 which showed enlarged left axillary lymph node for which ultrasound guided biopsy was recommended and done on 1/21/2020. Pathology showed metastatic mammary carcinoma with lobular and signet ring cell features that is ER > 90%, NE 0%, and Qmm7jth negative. MRI of the breast done 2/13/2020 showed nonmass enhancement in the upper inner left breast measuring 5 cm in AP dimension and abnormal left axillary lymph node with biopsy clip. Also 0.2 cm focus enhancing in the right breast. She underwent MRI guided biopsy of the left breast done on 3/11/2020. We reviewed neoadjuvant chemotherapy which she refused and was on anastrozole while awaiting surgery which was delayed due to covid. She underwent left lumpectomy with sentinel lymph node followed by left axillary lymph node dissection with Dr Hall on 5/5/2020. The pathology showed invasive lobular carcinoma, classic type, Grade 2, invasive component was 2.3 cm and 5 out of 7 lymph nodes positive for carcinoma: T2N2 ER 90%, NE 0%, Pdt9utr negative. We performed CT of the chest/ abd/ pelvis along with bone scan 5/22/2020 which showed indeterminate liver lesion. She had MRI of the abdomen 6/22/2020 done which showed hemangioma. She underwent prophylactic nipple sparing prophylactic right mastectomy and left mastectomy with additional lymph node removal on 6/30/2020 with Dr Hall. The pathology showed fibrocystic changes in the right prophylactic mastectomy and negative sentinel lymph node. The left mastectomy showed LCIS along with 5 additional positive lymph nodes.  [de-identified] : mammary carcinoma ER > 90%, [place] 0%, Kum3bbn negative  [de-identified] : dose dense AC 9/9/2020 to 10/21/2020 \par paclitaxel 11/4/2020 to present  [de-identified] : She denies any fevers or chills. Has fatigue but has been able to go to work. She denies any numbness or tingling of the fingers and toes. Her appetite is less but eating protein like chicken and having some greens. She denies any SOB or cough or new МАРИНА flap changes. She saw Dr Hall who saw something on us of the left reconstruction and will have biopsy following week. She denies any new medications. She is wondering if she could have Neulasta with the paclitaxel.

## 2020-12-23 NOTE — ASSESSMENT
[FreeTextEntry1] : She is a 59 y/o AAF Stage III left invasive lobular carcinoma who was on anastrozole while awaiting surgery s/p lumpectomy with axillary lymph node dissection in 5/5/2020 followed by mastectomy 6/30/2020 with additional lymph nodes removed. She has tolerated AC dose dense x 4 and has been on paclitaxel: week 7 today: her counts with ANC less than 1 K/microl will have to be held today. She will continue with foods rich in B12 and iron and will add folic acid supplement. We explained Neulasta cannot be given with weekly paclitaxel. We reviewed if counts do not recover by next week, we will consider dose reduction. We reviewed potential cumulative neuropathy and she will let us know if any symptoms. Will await to see if L reconstruction biopsy shows any pathology. She will be planned for RT followed by endocrine therapy. Questions answered to her satisfaction. Next follow up in 2 weeks.

## 2020-12-24 ENCOUNTER — OUTPATIENT (OUTPATIENT)
Dept: OUTPATIENT SERVICES | Facility: HOSPITAL | Age: 60
LOS: 1 days | Discharge: HOME | End: 2020-12-24

## 2020-12-24 DIAGNOSIS — Z98.890 OTHER SPECIFIED POSTPROCEDURAL STATES: Chronic | ICD-10-CM

## 2020-12-24 DIAGNOSIS — Z11.1 ENCOUNTER FOR SCREENING FOR RESPIRATORY TUBERCULOSIS: ICD-10-CM

## 2020-12-24 DIAGNOSIS — Z90.710 ACQUIRED ABSENCE OF BOTH CERVIX AND UTERUS: Chronic | ICD-10-CM

## 2020-12-24 DIAGNOSIS — Z90.13 ACQUIRED ABSENCE OF BILATERAL BREASTS AND NIPPLES: Chronic | ICD-10-CM

## 2020-12-30 ENCOUNTER — RESULT REVIEW (OUTPATIENT)
Age: 60
End: 2020-12-30

## 2020-12-30 ENCOUNTER — LABORATORY RESULT (OUTPATIENT)
Age: 60
End: 2020-12-30

## 2020-12-30 ENCOUNTER — APPOINTMENT (OUTPATIENT)
Dept: INFUSION THERAPY | Facility: HOSPITAL | Age: 60
End: 2020-12-30

## 2020-12-30 DIAGNOSIS — Z51.11 ENCOUNTER FOR ANTINEOPLASTIC CHEMOTHERAPY: ICD-10-CM

## 2020-12-30 DIAGNOSIS — R11.2 NAUSEA WITH VOMITING, UNSPECIFIED: ICD-10-CM

## 2020-12-30 DIAGNOSIS — C50.919 MALIGNANT NEOPLASM OF UNSPECIFIED SITE OF UNSPECIFIED FEMALE BREAST: ICD-10-CM

## 2020-12-30 LAB
BASOPHILS # BLD AUTO: 0.01 K/UL — SIGNIFICANT CHANGE UP (ref 0–0.2)
BASOPHILS NFR BLD AUTO: 0.3 % — SIGNIFICANT CHANGE UP (ref 0–2)
EOSINOPHIL # BLD AUTO: 0.03 K/UL — SIGNIFICANT CHANGE UP (ref 0–0.5)
EOSINOPHIL NFR BLD AUTO: 0.9 % — SIGNIFICANT CHANGE UP (ref 0–6)
HCT VFR BLD CALC: 30 % — LOW (ref 34.5–45)
HGB BLD-MCNC: 9.6 G/DL — LOW (ref 11.5–15.5)
IMM GRANULOCYTES NFR BLD AUTO: 3.1 % — HIGH (ref 0–1.5)
LYMPHOCYTES # BLD AUTO: 0.93 K/UL — LOW (ref 1–3.3)
LYMPHOCYTES # BLD AUTO: 28.8 % — SIGNIFICANT CHANGE UP (ref 13–44)
MCHC RBC-ENTMCNC: 27.5 PG — SIGNIFICANT CHANGE UP (ref 27–34)
MCHC RBC-ENTMCNC: 32 G/DL — SIGNIFICANT CHANGE UP (ref 32–36)
MCV RBC AUTO: 86 FL — SIGNIFICANT CHANGE UP (ref 80–100)
MONOCYTES # BLD AUTO: 0.62 K/UL — SIGNIFICANT CHANGE UP (ref 0–0.9)
MONOCYTES NFR BLD AUTO: 19.2 % — HIGH (ref 2–14)
NEUTROPHILS # BLD AUTO: 1.54 K/UL — LOW (ref 1.8–7.4)
NEUTROPHILS NFR BLD AUTO: 47.7 % — SIGNIFICANT CHANGE UP (ref 43–77)
NRBC # BLD: 0 /100 WBCS — SIGNIFICANT CHANGE UP (ref 0–0)
PLATELET # BLD AUTO: 297 K/UL — SIGNIFICANT CHANGE UP (ref 150–400)
RBC # BLD: 3.49 M/UL — LOW (ref 3.8–5.2)
RBC # FLD: 17.2 % — HIGH (ref 10.3–14.5)
WBC # BLD: 3.23 K/UL — LOW (ref 3.8–10.5)
WBC # FLD AUTO: 3.23 K/UL — LOW (ref 3.8–10.5)

## 2021-01-04 ENCOUNTER — APPOINTMENT (OUTPATIENT)
Dept: INFUSION THERAPY | Facility: HOSPITAL | Age: 61
End: 2021-01-04

## 2021-01-04 ENCOUNTER — LABORATORY RESULT (OUTPATIENT)
Age: 61
End: 2021-01-04

## 2021-01-06 ENCOUNTER — RESULT REVIEW (OUTPATIENT)
Age: 61
End: 2021-01-06

## 2021-01-06 ENCOUNTER — APPOINTMENT (OUTPATIENT)
Dept: INFUSION THERAPY | Facility: HOSPITAL | Age: 61
End: 2021-01-06

## 2021-01-06 ENCOUNTER — APPOINTMENT (OUTPATIENT)
Dept: HEMATOLOGY ONCOLOGY | Facility: CLINIC | Age: 61
End: 2021-01-06
Payer: COMMERCIAL

## 2021-01-06 ENCOUNTER — LABORATORY RESULT (OUTPATIENT)
Age: 61
End: 2021-01-06

## 2021-01-06 VITALS
TEMPERATURE: 96.6 F | HEIGHT: 62 IN | OXYGEN SATURATION: 100 % | SYSTOLIC BLOOD PRESSURE: 124 MMHG | DIASTOLIC BLOOD PRESSURE: 68 MMHG | BODY MASS INDEX: 30.63 KG/M2 | WEIGHT: 166.45 LBS | RESPIRATION RATE: 14 BRPM | HEART RATE: 90 BPM

## 2021-01-06 LAB
BASOPHILS # BLD AUTO: 0 K/UL — SIGNIFICANT CHANGE UP (ref 0–0.2)
BASOPHILS NFR BLD AUTO: 0 % — SIGNIFICANT CHANGE UP (ref 0–2)
EOSINOPHIL # BLD AUTO: 0.03 K/UL — SIGNIFICANT CHANGE UP (ref 0–0.5)
EOSINOPHIL NFR BLD AUTO: 1 % — SIGNIFICANT CHANGE UP (ref 0–6)
HCT VFR BLD CALC: 30.3 % — LOW (ref 34.5–45)
HGB BLD-MCNC: 9.7 G/DL — LOW (ref 11.5–15.5)
LYMPHOCYTES # BLD AUTO: 0.94 K/UL — LOW (ref 1–3.3)
LYMPHOCYTES # BLD AUTO: 28 % — SIGNIFICANT CHANGE UP (ref 13–44)
MCHC RBC-ENTMCNC: 27.5 PG — SIGNIFICANT CHANGE UP (ref 27–34)
MCHC RBC-ENTMCNC: 32 G/DL — SIGNIFICANT CHANGE UP (ref 32–36)
MCV RBC AUTO: 85.8 FL — SIGNIFICANT CHANGE UP (ref 80–100)
MONOCYTES # BLD AUTO: 0.33 K/UL — SIGNIFICANT CHANGE UP (ref 0–0.9)
MONOCYTES NFR BLD AUTO: 10 % — SIGNIFICANT CHANGE UP (ref 2–14)
NEUTROPHILS # BLD AUTO: 2.04 K/UL — SIGNIFICANT CHANGE UP (ref 1.8–7.4)
NEUTROPHILS NFR BLD AUTO: 61 % — SIGNIFICANT CHANGE UP (ref 43–77)
NRBC # BLD: 0 /100 — SIGNIFICANT CHANGE UP (ref 0–0)
NRBC # BLD: SIGNIFICANT CHANGE UP /100 WBCS (ref 0–0)
PLAT MORPH BLD: NORMAL — SIGNIFICANT CHANGE UP
PLATELET # BLD AUTO: 286 K/UL — SIGNIFICANT CHANGE UP (ref 150–400)
RBC # BLD: 3.53 M/UL — LOW (ref 3.8–5.2)
RBC # FLD: 15.8 % — HIGH (ref 10.3–14.5)
RBC BLD AUTO: SIGNIFICANT CHANGE UP
WBC # BLD: 3.34 K/UL — LOW (ref 3.8–10.5)
WBC # FLD AUTO: 3.34 K/UL — LOW (ref 3.8–10.5)

## 2021-01-06 PROCEDURE — 99213 OFFICE O/P EST LOW 20 MIN: CPT

## 2021-01-06 PROCEDURE — 99072 ADDL SUPL MATRL&STAF TM PHE: CPT

## 2021-01-09 NOTE — HISTORY OF PRESENT ILLNESS
[Disease: _____________________] : Disease: [unfilled] [T: ___] : T[unfilled] [N: ___] : N[unfilled] [AJCC Stage: ____] : AJCC Stage: [unfilled] [de-identified] : Age 60: left axilla biopsy metastatic mammary carcinoma \par Screening mammogram done on 12/7/2019 which showed enlarged left axillary lymph node for which ultrasound guided biopsy was recommended and done on 1/21/2020. Pathology showed metastatic mammary carcinoma with lobular and signet ring cell features that is ER > 90%, TN 0%, and Ttx3csc negative. MRI of the breast done 2/13/2020 showed nonmass enhancement in the upper inner left breast measuring 5 cm in AP dimension and abnormal left axillary lymph node with biopsy clip. Also 0.2 cm focus enhancing in the right breast. She underwent MRI guided biopsy of the left breast done on 3/11/2020. We reviewed neoadjuvant chemotherapy which she refused and was on anastrozole while awaiting surgery which was delayed due to covid. She underwent left lumpectomy with sentinel lymph node followed by left axillary lymph node dissection with Dr Hall on 5/5/2020. The pathology showed invasive lobular carcinoma, classic type, Grade 2, invasive component was 2.3 cm and 5 out of 7 lymph nodes positive for carcinoma: T2N2 ER 90%, TN 0%, Qka8ejk negative. We performed CT of the chest/ abd/ pelvis along with bone scan 5/22/2020 which showed indeterminate liver lesion. She had MRI of the abdomen 6/22/2020 done which showed hemangioma. She underwent prophylactic nipple sparing prophylactic right mastectomy and left mastectomy with additional lymph node removal on 6/30/2020 with Dr Hall. The pathology showed fibrocystic changes in the right prophylactic mastectomy and negative sentinel lymph node. The left mastectomy showed LCIS along with 5 additional positive lymph nodes.  [de-identified] : mammary carcinoma ER > 90%, [place] 0%, Zdu9grc negative  [de-identified] : dose dense AC 9/9/2020 to 10/21/2020 \par Paclitaxel 11/4/2020 to present  [de-identified] : Seen in follow up prior to C8 weekly Taxol scheduled for today. She reports mild fatigue & generalized aches. States taking Aleve prn for aches which has provided relief. She denies any fevers or chills. C/o constipation. Reports h/o "sluggish" bowel. States it's been days since her last BM despite her taking Miralax & Dulcolax. She denies abdominal pain. Reports her appetite has not been great & she has been drinking water as much as possible. She denies neuropathy. CBC drawn today reveals WBC 3.34, ANC 2040. Hgb 9.7.

## 2021-01-09 NOTE — REASON FOR VISIT
[Follow-Up Visit] : a follow-up [FreeTextEntry2] : follow up for breast cancer on weekly Paclitaxel C8

## 2021-01-09 NOTE — ASSESSMENT
[FreeTextEntry1] : She is a 59 y/o AAF Stage III left invasive lobular carcinoma who was on anastrozole while awaiting surgery s/p lumpectomy with axillary lymph node dissection in 5/5/2020 followed by mastectomy 6/30/2020 with additional lymph nodes removed. She has tolerated AC dose dense x 4 and has been on weekly Paclitaxel. She will receive C8 today. ANC 2040. Instructed her on plan to try to alleviate constipation. She was advised to increase hydration, water intake. She will also try prunes/prune juice, Senna & increase fiber intake. Follow up in 2 weeks or sooner if needed.

## 2021-01-09 NOTE — PHYSICAL EXAM
[Restricted in physically strenuous activity but ambulatory and able to carry out work of a light or sedentary nature] : Status 1- Restricted in physically strenuous activity but ambulatory and able to carry out work of a light or sedentary nature, e.g., light house work, office work [Normal] : affect appropriate [de-identified] : B МАРИНА flap reconstruction without any palpable axillary lymph nodes

## 2021-01-11 NOTE — DISCUSSION/SUMMARY
[Cancer Type / Location / Histology Subtype: ________] : Cancer Type / Location / Histology Subtype: [unfilled] [Diagnosis Date (year): ____] : Diagnosis Date (year): [unfilled] [III] : III [Surgery] : Surgery: Yes [Surgery Date(s) (year): ____] : Surgery Date(s) (year): [unfilled] [Surgical Procedure / Location / Findings: _________] : Surgical Procedure / Location / Findings: [unfilled] [Systemic Therapy (chemotherapy, hormonal therapy, other)] : Systemic Therapy (chemotherapy, hormonal therapy, other): Yes [Genetic / hereditary risk factor(s) or predisposing conditions: __________] : Genetic / hereditary risk factor(s) or predisposing conditions: [unfilled] [Need for onging (adjuvant) treatment for cancer?] : Need for onging (adjuvant) treatment for cancer? Yes [Primary care physician] : primary care physician [Colonoscopy] : colonoscopy [Skin Checks] : skin checks [PAP Test] : PAP test [Bone Density Test] : bone density test [Cholesterol Test] : cholesterol test [Annual Flu Shot] : annual flu shot [Cardiac Toxicity] : cardiac toxicity [Anxiety and Depression] : anxiety and depression [Cognitive Function] : cognitive function [Sexual Function] : sexual function [Sleep Disorders] : sleep disorders [Emotional and mental health] : Emotional and mental health [Memory or Concentration Loss] : Memory or concentration loss [Fatigue] : Fatigue [Weight Changes] : Weight changes [Sexual Functioning] : Sexual functioning [Weigh Management (loss / gain)] : Weight management (loss / gain) [Diet] : Diet [Sun screen use] : Sun screen use [Physical activity] : Physical activity [Path to Wellness Survivorship Program] : Path to Wellness Survivorship Program [Psycho-social Emotional Support ([place])] : Psycho-social Emotional Support  (please call SW at [phone]) [Nutritional and Wellness Counseling ([place])] : Nutritional and Wellness Counseling (please call Nutrition office at [phone]) [[facility] Breast Cancer Hotline] : [facility] Breast Cancer Hotline [Cancer Care] : Cancer Care [American Cancer Society] : the American Cancer Society [Other: ______] : [unfilled] [Radiation] : Radiation: Yes [Body Area Treated: _________] : Body Area Treated: [unfilled] [FreeTextEntry1] : adjuvant dose dense AC -T\par doxorubicin / cyclophosphamide every 2 weeks x 4 cycles completed 10/2020 \par cumulative doxorubicin dose  440mg/ 240mg/m2\par paclitaxel weekly x 12 weeks  completed 1/2021 [FreeTextEntry2] : anastrazole 1 mg daily for minimum 5 years for hormone sensitive tumor.\par Potential side effects include joint pain hot flashes, vaginal dryness, mood changes, sleep disturbance,\par elevation in blood pressure and cholesterol level, thinning of hair and bones, fatigue  [FreeTextEntry7] : Ms. Torres received anastrazole 1 mg daily while awaiting surgery which was delayed due to covid 19 pandemic.

## 2021-01-12 ENCOUNTER — RESULT REVIEW (OUTPATIENT)
Age: 61
End: 2021-01-12

## 2021-01-13 ENCOUNTER — APPOINTMENT (OUTPATIENT)
Dept: INFUSION THERAPY | Facility: HOSPITAL | Age: 61
End: 2021-01-13

## 2021-01-13 ENCOUNTER — RESULT REVIEW (OUTPATIENT)
Age: 61
End: 2021-01-13

## 2021-01-13 LAB
BASOPHILS # BLD AUTO: 0 K/UL — SIGNIFICANT CHANGE UP (ref 0–0.2)
BASOPHILS NFR BLD AUTO: 0 % — SIGNIFICANT CHANGE UP (ref 0–2)
EOSINOPHIL # BLD AUTO: 0.02 K/UL — SIGNIFICANT CHANGE UP (ref 0–0.5)
EOSINOPHIL NFR BLD AUTO: 1 % — SIGNIFICANT CHANGE UP (ref 0–6)
HCT VFR BLD CALC: 28.7 % — LOW (ref 34.5–45)
HGB BLD-MCNC: 9.2 G/DL — LOW (ref 11.5–15.5)
LYMPHOCYTES # BLD AUTO: 0.79 K/UL — LOW (ref 1–3.3)
LYMPHOCYTES # BLD AUTO: 33 % — SIGNIFICANT CHANGE UP (ref 13–44)
MCHC RBC-ENTMCNC: 28 PG — SIGNIFICANT CHANGE UP (ref 27–34)
MCHC RBC-ENTMCNC: 32.1 G/DL — SIGNIFICANT CHANGE UP (ref 32–36)
MCV RBC AUTO: 87.5 FL — SIGNIFICANT CHANGE UP (ref 80–100)
MONOCYTES # BLD AUTO: 0.36 K/UL — SIGNIFICANT CHANGE UP (ref 0–0.9)
MONOCYTES NFR BLD AUTO: 15 % — HIGH (ref 2–14)
NEUTROPHILS # BLD AUTO: 1.21 K/UL — LOW (ref 1.8–7.4)
NEUTROPHILS NFR BLD AUTO: 51 % — SIGNIFICANT CHANGE UP (ref 43–77)
NRBC # BLD: 0 /100 — SIGNIFICANT CHANGE UP (ref 0–0)
NRBC # BLD: SIGNIFICANT CHANGE UP /100 WBCS (ref 0–0)
PLAT MORPH BLD: NORMAL — SIGNIFICANT CHANGE UP
PLATELET # BLD AUTO: 271 K/UL — SIGNIFICANT CHANGE UP (ref 150–400)
RBC # BLD: 3.28 M/UL — LOW (ref 3.8–5.2)
RBC # FLD: 15.5 % — HIGH (ref 10.3–14.5)
RBC BLD AUTO: SIGNIFICANT CHANGE UP
WBC # BLD: 2.38 K/UL — LOW (ref 3.8–10.5)
WBC # FLD AUTO: 2.38 K/UL — LOW (ref 3.8–10.5)

## 2021-01-20 ENCOUNTER — RESULT REVIEW (OUTPATIENT)
Age: 61
End: 2021-01-20

## 2021-01-20 ENCOUNTER — APPOINTMENT (OUTPATIENT)
Dept: HEMATOLOGY ONCOLOGY | Facility: CLINIC | Age: 61
End: 2021-01-20
Payer: COMMERCIAL

## 2021-01-20 ENCOUNTER — APPOINTMENT (OUTPATIENT)
Dept: INFUSION THERAPY | Facility: HOSPITAL | Age: 61
End: 2021-01-20

## 2021-01-20 VITALS
RESPIRATION RATE: 16 BRPM | OXYGEN SATURATION: 99 % | HEIGHT: 62.99 IN | WEIGHT: 165.1 LBS | TEMPERATURE: 97 F | HEART RATE: 94 BPM | DIASTOLIC BLOOD PRESSURE: 82 MMHG | SYSTOLIC BLOOD PRESSURE: 128 MMHG | BODY MASS INDEX: 29.25 KG/M2

## 2021-01-20 DIAGNOSIS — R59.0 LOCALIZED ENLARGED LYMPH NODES: ICD-10-CM

## 2021-01-20 LAB
BASOPHILS # BLD AUTO: 0.02 K/UL — SIGNIFICANT CHANGE UP (ref 0–0.2)
BASOPHILS NFR BLD AUTO: 1 % — SIGNIFICANT CHANGE UP (ref 0–2)
EOSINOPHIL # BLD AUTO: 0 K/UL — SIGNIFICANT CHANGE UP (ref 0–0.5)
EOSINOPHIL NFR BLD AUTO: 0 % — SIGNIFICANT CHANGE UP (ref 0–6)
HCT VFR BLD CALC: 29.7 % — LOW (ref 34.5–45)
HGB BLD-MCNC: 9.6 G/DL — LOW (ref 11.5–15.5)
LYMPHOCYTES # BLD AUTO: 0.91 K/UL — LOW (ref 1–3.3)
LYMPHOCYTES # BLD AUTO: 41 % — SIGNIFICANT CHANGE UP (ref 13–44)
MCHC RBC-ENTMCNC: 28.2 PG — SIGNIFICANT CHANGE UP (ref 27–34)
MCHC RBC-ENTMCNC: 32.3 G/DL — SIGNIFICANT CHANGE UP (ref 32–36)
MCV RBC AUTO: 87.4 FL — SIGNIFICANT CHANGE UP (ref 80–100)
MONOCYTES # BLD AUTO: 0.22 K/UL — SIGNIFICANT CHANGE UP (ref 0–0.9)
MONOCYTES NFR BLD AUTO: 10 % — SIGNIFICANT CHANGE UP (ref 2–14)
NEUTROPHILS # BLD AUTO: 1.06 K/UL — LOW (ref 1.8–7.4)
NEUTROPHILS NFR BLD AUTO: 48 % — SIGNIFICANT CHANGE UP (ref 43–77)
NRBC # BLD: 0 /100 — SIGNIFICANT CHANGE UP (ref 0–0)
NRBC # BLD: SIGNIFICANT CHANGE UP /100 WBCS (ref 0–0)
PLAT MORPH BLD: NORMAL — SIGNIFICANT CHANGE UP
PLATELET # BLD AUTO: 281 K/UL — SIGNIFICANT CHANGE UP (ref 150–400)
RBC # BLD: 3.4 M/UL — LOW (ref 3.8–5.2)
RBC # FLD: 15.3 % — HIGH (ref 10.3–14.5)
RBC BLD AUTO: SIGNIFICANT CHANGE UP
WBC # BLD: 2.21 K/UL — LOW (ref 3.8–10.5)
WBC # FLD AUTO: 2.21 K/UL — LOW (ref 3.8–10.5)

## 2021-01-20 PROCEDURE — 99215 OFFICE O/P EST HI 40 MIN: CPT

## 2021-01-20 PROCEDURE — 99072 ADDL SUPL MATRL&STAF TM PHE: CPT

## 2021-01-20 RX ORDER — ANASTROZOLE 1 MG/1
1 TABLET ORAL
Qty: 0 | Refills: 0 | DISCHARGE

## 2021-01-20 NOTE — ASSESSMENT
[FreeTextEntry1] : She is a 62 y/o AAF Stage III left invasive lobular carcinoma who was on anastrozole while awaiting surgery s/p lumpectomy with axillary lymph node dissection in 5/5/2020 followed by mastectomy 6/30/2020 with additional lymph nodes removed. She has tolerated AC dose dense x 4 and has been on paclitaxel: week 11 today: she had Grade 1 myalgias and arthralgias which is expected on paclitaxel. We reviewed supportive care. We reviewed continued cold therapy during paclitaxel infusion and massage of the hands and feet. We reviewed cumulative neuropathy and if worse after week 11 may hold off on week 12. We reviewed leucopenia; she will continue with B supplement and oral intake. \par \par We reviewed expectations after completion of chemotherapy and exercise to have stamina return. We reviewed RT evaluation given number of LN that was affected. We reviewed anastrozole along with survivorship follow up after completion of RT. Questions answered to her satisfaction. Will plan on port flush in 8 weeks and removal if covid surge is improved and counts improved. She understands to call if any fevers or chills.

## 2021-01-20 NOTE — PHYSICAL EXAM
[Restricted in physically strenuous activity but ambulatory and able to carry out work of a light or sedentary nature] : Status 1- Restricted in physically strenuous activity but ambulatory and able to carry out work of a light or sedentary nature, e.g., light house work, office work [Normal] : affect appropriate [de-identified] : B МАРИНА flap reconstruction without any palpable axillary lymph nodes; mild hyperpigmentation over the right and left reconstructed sites

## 2021-01-20 NOTE — REASON FOR VISIT
[Follow-Up Visit] : a follow-up [FreeTextEntry2] : follow up for breast cancer on Week 11 of paclitaxel

## 2021-01-20 NOTE — HISTORY OF PRESENT ILLNESS
[Disease: _____________________] : Disease: [unfilled] [T: ___] : T[unfilled] [N: ___] : N[unfilled] [AJCC Stage: ____] : AJCC Stage: [unfilled] [de-identified] : Age 60: left axilla biopsy metastatic mammary carcinoma \par Screening mammogram done on 12/7/2019 which showed enlarged left axillary lymph node for which ultrasound guided biopsy was recommended and done on 1/21/2020. Pathology showed metastatic mammary carcinoma with lobular and signet ring cell features that is ER > 90%, PA 0%, and Tux4tfh negative. MRI of the breast done 2/13/2020 showed nonmass enhancement in the upper inner left breast measuring 5 cm in AP dimension and abnormal left axillary lymph node with biopsy clip. Also 0.2 cm focus enhancing in the right breast. She underwent MRI guided biopsy of the left breast done on 3/11/2020. We reviewed neoadjuvant chemotherapy which she refused and was on anastrozole while awaiting surgery which was delayed due to covid. She underwent left lumpectomy with sentinel lymph node followed by left axillary lymph node dissection with Dr Hall on 5/5/2020. The pathology showed invasive lobular carcinoma, classic type, Grade 2, invasive component was 2.3 cm and 5 out of 7 lymph nodes positive for carcinoma: T2N2 ER 90%, PA 0%, Grq4xek negative. We performed CT of the chest/ abd/ pelvis along with bone scan 5/22/2020 which showed indeterminate liver lesion. She had MRI of the abdomen 6/22/2020 done which showed hemangioma. She underwent prophylactic nipple sparing prophylactic right mastectomy and left mastectomy with additional lymph node removal on 6/30/2020 with Dr Hall. The pathology showed fibrocystic changes in the right prophylactic mastectomy and negative sentinel lymph node. The left mastectomy showed LCIS along with 5 additional positive lymph nodes.  [de-identified] : mammary carcinoma ER > 90%, [place] 0%, Ceg2tra negative  [de-identified] : dose dense AC 9/9/2020 to 10/21/2020 \par paclitaxel 11/4/2020 to present  [de-identified] : She has been having thigh achiness but denies any trouble walking up stairs or standing. She has tingling in the fingers but denies any affect on function. She is tired of treatment and hoping to finish soon. She is eating normally. She is continuing to use ice packs during paclitaxel infusion. She denies any chest wall changes or pain. She had skin biopsy done by Dr Hall that was benign. No new medications. No fevers or chills.

## 2021-01-20 NOTE — REVIEW OF SYSTEMS
[Fatigue] : fatigue [Negative] : Allergic/Immunologic [Fever] : no fever [Chills] : no chills [Night Sweats] : no night sweats [Recent Change In Weight] : ~T no recent weight change [Joint Pain] : no joint pain [Joint Stiffness] : no joint stiffness [Muscle Pain] : no muscle pain [Muscle Weakness] : no muscle weakness [Confused] : no confusion [Dizziness] : no dizziness [Fainting] : no fainting [Difficulty Walking] : no difficulty walking [FreeTextEntry9] : thigh muscle achiness  [de-identified] : tingling over the fingertips

## 2021-01-25 ENCOUNTER — OUTPATIENT (OUTPATIENT)
Dept: OUTPATIENT SERVICES | Facility: HOSPITAL | Age: 61
LOS: 1 days | Discharge: HOME | End: 2021-01-25

## 2021-01-25 DIAGNOSIS — Z90.13 ACQUIRED ABSENCE OF BILATERAL BREASTS AND NIPPLES: Chronic | ICD-10-CM

## 2021-01-25 DIAGNOSIS — Z90.710 ACQUIRED ABSENCE OF BOTH CERVIX AND UTERUS: Chronic | ICD-10-CM

## 2021-01-25 DIAGNOSIS — Z98.890 OTHER SPECIFIED POSTPROCEDURAL STATES: Chronic | ICD-10-CM

## 2021-01-25 DIAGNOSIS — Z11.1 ENCOUNTER FOR SCREENING FOR RESPIRATORY TUBERCULOSIS: ICD-10-CM

## 2021-01-27 ENCOUNTER — APPOINTMENT (OUTPATIENT)
Dept: INFUSION THERAPY | Facility: HOSPITAL | Age: 61
End: 2021-01-27

## 2021-02-03 ENCOUNTER — APPOINTMENT (OUTPATIENT)
Dept: RADIATION ONCOLOGY | Facility: CLINIC | Age: 61
End: 2021-02-03
Payer: COMMERCIAL

## 2021-02-03 VITALS
RESPIRATION RATE: 16 BRPM | SYSTOLIC BLOOD PRESSURE: 130 MMHG | OXYGEN SATURATION: 99 % | DIASTOLIC BLOOD PRESSURE: 86 MMHG | HEART RATE: 91 BPM

## 2021-02-03 PROCEDURE — 99072 ADDL SUPL MATRL&STAF TM PHE: CPT

## 2021-02-03 PROCEDURE — 99204 OFFICE O/P NEW MOD 45 MIN: CPT | Mod: 25

## 2021-02-03 NOTE — OB/GYN HISTORY
[Currently In Menopause] : currently in menopause [Menopause Age: ____] : patient was [unfilled] years old at menopause [___] : Full Term: [unfilled]

## 2021-02-12 ENCOUNTER — OUTPATIENT (OUTPATIENT)
Dept: OUTPATIENT SERVICES | Facility: HOSPITAL | Age: 61
LOS: 1 days | Discharge: HOME | End: 2021-02-12
Payer: COMMERCIAL

## 2021-02-12 DIAGNOSIS — Z98.890 OTHER SPECIFIED POSTPROCEDURAL STATES: Chronic | ICD-10-CM

## 2021-02-12 DIAGNOSIS — Z90.710 ACQUIRED ABSENCE OF BOTH CERVIX AND UTERUS: Chronic | ICD-10-CM

## 2021-02-12 DIAGNOSIS — Z90.13 ACQUIRED ABSENCE OF BILATERAL BREASTS AND NIPPLES: Chronic | ICD-10-CM

## 2021-02-12 PROCEDURE — 99072 ADDL SUPL MATRL&STAF TM PHE: CPT

## 2021-02-12 PROCEDURE — 77263 THER RADIOLOGY TX PLNG CPLX: CPT

## 2021-02-22 ENCOUNTER — NON-APPOINTMENT (OUTPATIENT)
Age: 61
End: 2021-02-22

## 2021-02-22 PROCEDURE — 77290 THER RAD SIMULAJ FIELD CPLX: CPT | Mod: 26

## 2021-02-22 PROCEDURE — 77333 RADIATION TREATMENT AID(S): CPT | Mod: 26

## 2021-02-25 NOTE — LETTER CLOSING
[Consult Closing:] : Thank you for allowing me to participate in the care of this patient.  If you have any questions, please do not hesitate to contact me. [Sincerely yours,] : Sincerely yours, [FreeTextEntry3] : Halina Diehl MD\par

## 2021-02-25 NOTE — REVIEW OF SYSTEMS
[Fatigue] : fatigue [Muscle Pain] : muscle pain [Negative] : Heme/Lymph [Fever] : no fever [Night Sweats] : no night sweats [Muscle Weakness] : no muscle weakness [de-identified] : alopecia

## 2021-02-25 NOTE — HISTORY OF PRESENT ILLNESS
[FreeTextEntry1] : Ms. Torres is a 61 year old female with stage III left invasive lobular carcinoma, who is now s/p lumpectomy with axillary lymph node dissection in 5/5/2020 followed by mastectomy 6/30/2020 with additional lymph nodes removed. She has completed adjuvant chemotherapy, and presents today for radiotherapy recommendations.  Her oncologic history is below:\par \par Screening mammogram on 12/7/2019 revealed an enlarged left axillary lymph node, for which ultrasound guided biopsy was recommended.  Biopsy on 1/21/2020 showed metastatic mammary carcinoma with lobular and signet ring cell features, which was ER > 90%, MS 0%, and Tug0mdc negative. MRI of the breast done 2/13/2020 showed nonmass enhancement in the upper inner left breast measuring 5 cm in AP dimension and abnormal left axillary lymph node with biopsy clip. Also 0.2 cm focus enhancing in the right breast. \par \par She underwent MRI guided biopsy of the upper central left breast done on 3/11/2020. Pathology showing invasive lobular carcinoma with focal signet ring cell features.  Amador score 6/9, tumor measuring at least 13mm, LVI not seen.  ER 90% positive, MS negative 0%, HER 2 negative.\par \par She underwent left lumpectomy and left axillary dissection on 5/5/2020 with Dr. Hall, which demonstrated invasive lobular carcinoma, [place] grade 2, tumor measuring 2.3 cm, no LVI. Tumor present at deep margin and 0.7mm from the anterior margin.  Left axillary dissection: 5/7 lymph nodes positive for metastatic carcinoma, largest measures 2.0 cm in greatest dimension.  Extranodal extension present.\par \par CT CAP and bone scan on 5/22/2020 showed indeterminate liver lesion.  She had MRI of the abdomen 6/22/2020 done which showed hemangioma.\par \par She underwent prophylactic nipple sparing prophylactic right mastectomy and left mastectomy with additional lymph node removal on 6/30/2020 with Dr Hall. The pathology showed fibrocystic changes in the right prophylactic mastectomy and negative sentinel lymph node. The left mastectomy showed LCIS along with 5 additional positive lymph nodes.  \par \par She completed dose dense AC and paclitaxel, completed treatment on 1/20/2021 with Dr. Lucia.  She is here for radiotherapy recommendations.\par \par Interval history:   She continues to have mild myalgias, peripheral neuropathy, and fatigue after completion of chemotherapy.  She reports some mild swelling in her left arm s/p mastectomy.\par \par

## 2021-02-25 NOTE — PHYSICAL EXAM
[General Appearance - Well Developed] : well developed [Sclera] : the sclera and conjunctiva were normal [Outer Ear] : the ears and nose were normal in appearance [] : no rash [No Focal Deficits] : no focal deficits [Heart Rate And Rhythm] : heart rate and rhythm were normal [No UE Edema] : there is no upper extremity edema [Abdomen Soft] : soft [Nondistended] : nondistended [Abdomen Tenderness] : non-tender [Supraclavicular Lymph Nodes Enlarged Bilaterally] : supraclavicular [Axillary Lymph Nodes Enlarged Bilaterally] : axillary [Normal] : oriented to person, place and time, the affect was normal, the mood was normal and not anxious [de-identified] : Bilateral МАРИНА flap reconstruction, incisions intact.

## 2021-03-02 ENCOUNTER — OUTPATIENT (OUTPATIENT)
Dept: OUTPATIENT SERVICES | Facility: HOSPITAL | Age: 61
LOS: 1 days | Discharge: HOME | End: 2021-03-02

## 2021-03-02 DIAGNOSIS — Z98.890 OTHER SPECIFIED POSTPROCEDURAL STATES: Chronic | ICD-10-CM

## 2021-03-02 DIAGNOSIS — Z90.13 ACQUIRED ABSENCE OF BILATERAL BREASTS AND NIPPLES: Chronic | ICD-10-CM

## 2021-03-02 DIAGNOSIS — Z90.710 ACQUIRED ABSENCE OF BOTH CERVIX AND UTERUS: Chronic | ICD-10-CM

## 2021-03-02 DIAGNOSIS — Z11.1 ENCOUNTER FOR SCREENING FOR RESPIRATORY TUBERCULOSIS: ICD-10-CM

## 2021-03-03 PROCEDURE — 77295 3-D RADIOTHERAPY PLAN: CPT | Mod: 26

## 2021-03-03 PROCEDURE — 77334 RADIATION TREATMENT AID(S): CPT | Mod: 26

## 2021-03-03 PROCEDURE — 77300 RADIATION THERAPY DOSE PLAN: CPT | Mod: 26

## 2021-03-11 ENCOUNTER — NON-APPOINTMENT (OUTPATIENT)
Age: 61
End: 2021-03-11

## 2021-03-11 ENCOUNTER — APPOINTMENT (OUTPATIENT)
Dept: HEMATOLOGY ONCOLOGY | Facility: CLINIC | Age: 61
End: 2021-03-11
Payer: COMMERCIAL

## 2021-03-11 ENCOUNTER — RESULT REVIEW (OUTPATIENT)
Age: 61
End: 2021-03-11

## 2021-03-11 VITALS
OXYGEN SATURATION: 100 % | DIASTOLIC BLOOD PRESSURE: 82 MMHG | WEIGHT: 163.8 LBS | SYSTOLIC BLOOD PRESSURE: 122 MMHG | HEART RATE: 76 BPM | RESPIRATION RATE: 17 BRPM | HEIGHT: 62.95 IN | BODY MASS INDEX: 29.02 KG/M2 | TEMPERATURE: 97.8 F

## 2021-03-11 DIAGNOSIS — R53.83 OTHER FATIGUE: ICD-10-CM

## 2021-03-11 LAB
BASOPHILS # BLD AUTO: 0.01 K/UL — SIGNIFICANT CHANGE UP (ref 0–0.2)
BASOPHILS NFR BLD AUTO: 0.2 % — SIGNIFICANT CHANGE UP (ref 0–2)
EOSINOPHIL # BLD AUTO: 0.03 K/UL — SIGNIFICANT CHANGE UP (ref 0–0.5)
EOSINOPHIL NFR BLD AUTO: 0.6 % — SIGNIFICANT CHANGE UP (ref 0–6)
HCT VFR BLD CALC: 35.5 % — SIGNIFICANT CHANGE UP (ref 34.5–45)
HGB BLD-MCNC: 11.2 G/DL — LOW (ref 11.5–15.5)
IMM GRANULOCYTES NFR BLD AUTO: 0.4 % — SIGNIFICANT CHANGE UP (ref 0–1.5)
LYMPHOCYTES # BLD AUTO: 1.34 K/UL — SIGNIFICANT CHANGE UP (ref 1–3.3)
LYMPHOCYTES # BLD AUTO: 26.4 % — SIGNIFICANT CHANGE UP (ref 13–44)
MCHC RBC-ENTMCNC: 26.7 PG — LOW (ref 27–34)
MCHC RBC-ENTMCNC: 31.5 G/DL — LOW (ref 32–36)
MCV RBC AUTO: 84.5 FL — SIGNIFICANT CHANGE UP (ref 80–100)
MONOCYTES # BLD AUTO: 0.51 K/UL — SIGNIFICANT CHANGE UP (ref 0–0.9)
MONOCYTES NFR BLD AUTO: 10 % — SIGNIFICANT CHANGE UP (ref 2–14)
NEUTROPHILS # BLD AUTO: 3.17 K/UL — SIGNIFICANT CHANGE UP (ref 1.8–7.4)
NEUTROPHILS NFR BLD AUTO: 62.4 % — SIGNIFICANT CHANGE UP (ref 43–77)
NRBC # BLD: 0 /100 WBCS — SIGNIFICANT CHANGE UP (ref 0–0)
PLATELET # BLD AUTO: 227 K/UL — SIGNIFICANT CHANGE UP (ref 150–400)
RBC # BLD: 4.2 M/UL — SIGNIFICANT CHANGE UP (ref 3.8–5.2)
RBC # FLD: 14.5 % — SIGNIFICANT CHANGE UP (ref 10.3–14.5)
WBC # BLD: 5.08 K/UL — SIGNIFICANT CHANGE UP (ref 3.8–10.5)
WBC # FLD AUTO: 5.08 K/UL — SIGNIFICANT CHANGE UP (ref 3.8–10.5)

## 2021-03-11 PROCEDURE — 99072 ADDL SUPL MATRL&STAF TM PHE: CPT

## 2021-03-11 PROCEDURE — 99214 OFFICE O/P EST MOD 30 MIN: CPT

## 2021-03-11 RX ORDER — FOLIC ACID 1 MG/1
1 TABLET ORAL DAILY
Qty: 90 | Refills: 0 | Status: DISCONTINUED | COMMUNITY
Start: 2020-12-23 | End: 2021-03-11

## 2021-03-11 RX ORDER — PROCHLORPERAZINE MALEATE 10 MG/1
10 TABLET ORAL EVERY 6 HOURS
Qty: 60 | Refills: 0 | Status: DISCONTINUED | COMMUNITY
Start: 2020-08-29 | End: 2021-03-11

## 2021-03-11 NOTE — ASSESSMENT
[FreeTextEntry1] : She is a 62 y/o AAF Stage III left invasive lobular carcinoma who was on anastrozole while awaiting surgery s/p lumpectomy with axillary lymph node dissection in 5/5/2020 followed by mastectomy 6/30/2020 with additional lymph nodes removed. She completed adjuvant dose dense AC followed by paclitaxel (week 12 held due to worsening neuropathy). We reviewed her Grade 1 neuropathy and continuation of B12 and trial of alpha lipoic acid. We reviewed supportive measures and neuropathy may be chronic symptom from prior taxane. We reviewed her fatigue after chemotherapy which has improved and reviewed introduction of increasing amounts of exercise for recovery. We reviewed expected counts will have recovered enough for covid vaccine: currently she wants to wait until after RT. We reviewed expected recovery and potential side effects during RT. We reviewed expectations with resuming anastrozole. Questions answered to her satisfaction. Will follow up in 1 month.

## 2021-03-11 NOTE — PHYSICAL EXAM
[Restricted in physically strenuous activity but ambulatory and able to carry out work of a light or sedentary nature] : Status 1- Restricted in physically strenuous activity but ambulatory and able to carry out work of a light or sedentary nature, e.g., light house work, office work [Normal] : affect appropriate [de-identified] : R sided port C/D/I  [de-identified] : B МАРИНА flap reconstruction without any palpable axillary lymph nodes; mild hyperpigmentation over the right and left reconstructed sites  [de-identified] :  strength 5/5 BUE; negative Tinel's sign, gait steady

## 2021-03-11 NOTE — HISTORY OF PRESENT ILLNESS
[Disease: _____________________] : Disease: [unfilled] [T: ___] : T[unfilled] [N: ___] : N[unfilled] [AJCC Stage: ____] : AJCC Stage: [unfilled] [de-identified] : Age 60: left axilla biopsy metastatic mammary carcinoma \par Screening mammogram done on 12/7/2019 which showed enlarged left axillary lymph node for which ultrasound guided biopsy was recommended and done on 1/21/2020. Pathology showed metastatic mammary carcinoma with lobular and signet ring cell features that is ER > 90%, OK 0%, and Int8ifa negative. MRI of the breast done 2/13/2020 showed nonmass enhancement in the upper inner left breast measuring 5 cm in AP dimension and abnormal left axillary lymph node with biopsy clip. Also 0.2 cm focus enhancing in the right breast. She underwent MRI guided biopsy of the left breast done on 3/11/2020. We reviewed neoadjuvant chemotherapy which she refused and was on anastrozole while awaiting surgery which was delayed due to covid. She underwent left lumpectomy with sentinel lymph node followed by left axillary lymph node dissection with Dr Hall on 5/5/2020. The pathology showed invasive lobular carcinoma, classic type, Grade 2, invasive component was 2.3 cm and 5 out of 7 lymph nodes positive for carcinoma: T2N2 ER 90%, OK 0%, Lqz2fwd negative. We performed CT of the chest/ abd/ pelvis along with bone scan 5/22/2020 which showed indeterminate liver lesion. She had MRI of the abdomen 6/22/2020 done which showed hemangioma. She underwent prophylactic nipple sparing prophylactic right mastectomy and left mastectomy with additional lymph node removal on 6/30/2020 with Dr Hall. The pathology showed fibrocystic changes in the right prophylactic mastectomy and negative sentinel lymph node. The left mastectomy showed LCIS along with 5 additional positive lymph nodes. She had adjuvant dose dense AC from 9/9/2020 to 10/21/2020 followed by weekly paclitaxel from 11/4/2020 to 1/2021. She started RT with Dr Diehl 3/2021.  [de-identified] : mammary carcinoma ER > 90%, MT 0%, Aaz3guv negative  [de-identified] : dose dense AC 9/9/2020 to 10/21/2020 \par paclitaxel 11/4/2020 to 1/20/2021 [de-identified] : Since last follow up, she feels energy is continuing to improve: she is able to do her ADLs. She has stationary bike and trying to start exercise. She has numbness of the fingertips: rates discomfort as 5/10 and improved from 6/10. She feels overall the numbness if better since paclitaxel 1/20/2021. Able to manipulate items and has not noticed any trouble with dropping items. She will be starting RT tomorrow and will finish mid April. She would like to have port removed. She has not had covid vaccine but would like to wait for completion of chemotherapy.

## 2021-03-11 NOTE — CONSULT LETTER
[Dear  ___] : Dear  [unfilled], [Courtesy Letter:] : I had the pleasure of seeing your patient, [unfilled], in my office today. [Please see my note below.] : Please see my note below. [Consult Closing:] : Thank you very much for allowing me to participate in the care of this patient.  If you have any questions, please do not hesitate to contact me. [Sincerely,] : Sincerely, [FreeTextEntry2] : Heidy Hall MD\par [address] [facility] [address]\[place] [address] [FreeTextEntry3] : Richard Lucia MD\par Attending\par [facility]\par  [DrCoral  ___] : Dr. WALLACE

## 2021-03-11 NOTE — REVIEW OF SYSTEMS
[Fever] : no fever [Chills] : no chills [Night Sweats] : no night sweats [Fatigue] : fatigue [Recent Change In Weight] : ~T no recent weight change [Confused] : no confusion [Dizziness] : no dizziness [Fainting] : no fainting [Difficulty Walking] : no difficulty walking [Negative] : Allergic/Immunologic [de-identified] : numbness of the fingertips

## 2021-03-12 LAB
ALBUMIN SERPL ELPH-MCNC: 4 G/DL
ALP BLD-CCNC: 92 U/L
ALT SERPL-CCNC: 8 U/L
ANION GAP SERPL CALC-SCNC: 16 MMOL/L
AST SERPL-CCNC: 16 U/L
BILIRUB SERPL-MCNC: 0.3 MG/DL
BUN SERPL-MCNC: 16 MG/DL
CALCIUM SERPL-MCNC: 9.5 MG/DL
CHLORIDE SERPL-SCNC: 106 MMOL/L
CO2 SERPL-SCNC: 20 MMOL/L
CREAT SERPL-MCNC: 0.75 MG/DL
GLUCOSE SERPL-MCNC: 93 MG/DL
POTASSIUM SERPL-SCNC: 4.6 MMOL/L
PROT SERPL-MCNC: 6.4 G/DL
SODIUM SERPL-SCNC: 142 MMOL/L

## 2021-03-12 PROCEDURE — G6017: CPT

## 2021-03-15 ENCOUNTER — NON-APPOINTMENT (OUTPATIENT)
Age: 61
End: 2021-03-15

## 2021-03-15 LAB — CANCER AG27-29 SERPL-ACNC: 29.3 U/ML

## 2021-03-15 PROCEDURE — G6017: CPT

## 2021-03-15 RX ORDER — OMEGA-3/DHA/EPA/FISH OIL 35-113.5MG
1000 TABLET,CHEWABLE ORAL
Refills: 0 | Status: ACTIVE | COMMUNITY
Start: 2021-03-15

## 2021-03-16 PROCEDURE — G6017: CPT

## 2021-03-16 NOTE — HISTORY OF PRESENT ILLNESS
[FreeTextEntry1] : Ms. Torres is a 62yo woman with stage IIIC, [id number] ER+PRneg HER2 negative, invasive lobular carcinoma with signet ring cell features, s/p lumpectomy with positive deep margin and ALND showing 5/7 positive lymph nodes; and subsequent bilateral mastectomies showing 5 more positive LNs and LCIS in left breast and LAKISHA. She completed ddACT with Dr. Lucia, and is now receiving post-mastectomy radiation therapy. \par \par She is feeling generally well. She denies fatigue and pain. She has not noted any skin reactions. She is using Aquaphor on the skin.\par

## 2021-03-16 NOTE — PHYSICAL EXAM
[Normal] : well developed, well nourished, in no acute distress [de-identified] : left chest wall with mastectomy scar, no erythema or hyperpigmentation

## 2021-03-16 NOTE — DISEASE MANAGEMENT
[TTNM] : 2 [NTNM] : 3 [MTNM] : 0 [de-identified] : 400cGy [de-identified] : 5000cGy [de-identified] : left chest wall and nodes

## 2021-03-17 PROCEDURE — G6017: CPT

## 2021-03-18 ENCOUNTER — NON-APPOINTMENT (OUTPATIENT)
Age: 61
End: 2021-03-18

## 2021-03-18 PROCEDURE — 77427 RADIATION TX MANAGEMENT X5: CPT

## 2021-03-18 PROCEDURE — G6017: CPT

## 2021-03-19 PROCEDURE — G6017: CPT

## 2021-03-22 PROCEDURE — G6017: CPT

## 2021-03-22 NOTE — HISTORY OF PRESENT ILLNESS
[FreeTextEntry1] : Ms. Torres is a 60yo woman with stage IIIC, [id number] ER+PRneg HER2 negative, invasive lobular carcinoma with signet ring cell features, s/p lumpectomy with positive deep margin and ALND showing 5/7 positive lymph nodes; and subsequent bilateral mastectomies showing 5 more positive LNs and LCIS in left breast and LAKISHA. She completed ddACT with Dr. uLcia, and is now receiving post-mastectomy radiation therapy. \par \par She is feeling generally well. She denies fatigue and pain. She has not noted any skin reactions. She is using Aquaphor on the skin. \par

## 2021-03-22 NOTE — PHYSICAL EXAM
[Normal] : well developed, well nourished, in no acute distress [de-identified] : left chest wall with mastectomy scar, no erythema or hyperpigmentation

## 2021-03-22 NOTE — DISEASE MANAGEMENT
[Pathological] : TNM Stage: p [IIIC] : IIIC [TTNM] : 2 [NTNM] : 3 [MTNM] : 0 [de-identified] : 1000cGy [de-identified] : 5000cGy [de-identified] : left chest wall and nodes

## 2021-03-23 PROCEDURE — G6017: CPT

## 2021-03-24 PROCEDURE — G6017: CPT

## 2021-03-25 ENCOUNTER — NON-APPOINTMENT (OUTPATIENT)
Age: 61
End: 2021-03-25

## 2021-03-25 PROCEDURE — G6017: CPT

## 2021-03-25 PROCEDURE — 77427 RADIATION TX MANAGEMENT X5: CPT

## 2021-03-25 NOTE — REVIEW OF SYSTEMS
[Alopecia: Grade 0] : Alopecia: Grade 0 [Pruritus: Grade 0] : Pruritus: Grade 0 [Skin Atrophy: Grade 0] : Skin Atrophy: Grade 0 [Skin Induration: Grade 0] : Skin Induration: Grade 0 [Skin Hyperpigmentation: Grade 1 - Hyperpigmentation covering <10% BSA; no psychosocial impact] : Skin Hyperpigmentation: Grade 1 - Hyperpigmentation covering <10% BSA; no psychosocial impact [Dermatitis Radiation: Grade 1 - Faint erythema or dry desquamation] : Dermatitis Radiation: Grade 1 - Faint erythema or dry desquamation

## 2021-03-26 PROCEDURE — G6017: CPT

## 2021-03-29 PROCEDURE — G6017: CPT

## 2021-03-30 PROCEDURE — G6017: CPT

## 2021-03-30 NOTE — HISTORY OF PRESENT ILLNESS
[FreeTextEntry1] : Ms. Torres is a 62yo woman with stage IIIC, [id number] ER+PRneg HER2 negative, invasive lobular carcinoma with signet ring cell features, s/p lumpectomy with positive deep margin and ALND showing 5/7 positive lymph nodes; and subsequent bilateral mastectomies showing 5 more positive LNs and LCIS in left breast and LAKISHA. She completed ddACT with Dr. Lucia, and is now receiving post-mastectomy radiation therapy. \par \par She is feeling generally well. She denies fatigue and pain. Minimal discomfort to the skin. She is using Aquaphor on the skin. \par

## 2021-03-30 NOTE — DISEASE MANAGEMENT
[Pathological] : TNM Stage: p [IIIC] : IIIC [TTNM] : 2 [NTNM] : 3 [MTNM] : 0 [de-identified] : 2000cGy [de-identified] : 5000cGy [de-identified] : left chest wall and nodes

## 2021-03-30 NOTE — PHYSICAL EXAM
[Normal] : well developed, well nourished, in no acute distress [de-identified] : left chest wall with mastectomy scar, mild erythema and hyperpigmentation

## 2021-03-31 PROCEDURE — G6017: CPT

## 2021-04-01 ENCOUNTER — NON-APPOINTMENT (OUTPATIENT)
Age: 61
End: 2021-04-01

## 2021-04-01 PROCEDURE — G6017: CPT

## 2021-04-01 PROCEDURE — 77427 RADIATION TX MANAGEMENT X5: CPT

## 2021-04-02 PROCEDURE — G6017: CPT

## 2021-04-02 NOTE — PHYSICAL EXAM
[Normal] : well developed, well nourished, in no acute distress [de-identified] : left chest wall with mastectomy scar, mild erythema and hyperpigmentation

## 2021-04-02 NOTE — DISEASE MANAGEMENT
[Pathological] : TNM Stage: p [IIIC] : IIIC [TTNM] : 2 [NTNM] : 3 [de-identified] : 3000cGy [MTNM] : 0 [de-identified] : 5000cGy [de-identified] : left chest wall and nodes

## 2021-04-02 NOTE — HISTORY OF PRESENT ILLNESS
[FreeTextEntry1] : Ms. Torres is a 60yo woman with stage IIIC, [id number] ER+PRneg HER2 negative, invasive lobular carcinoma with signet ring cell features, s/p lumpectomy with positive deep margin and ALND showing 5/7 positive lymph nodes; and subsequent bilateral mastectomies showing 5 more positive LNs and LCIS in left breast and LAKISHA. She completed ddACT with Dr. Lucia, and is now receiving post-mastectomy radiation therapy. \par \par She is feeling generally well. She denies fatigue and pain. Minimal discomfort to the skin. She is using Aquaphor on the skin. \par

## 2021-04-05 PROCEDURE — G6017: CPT

## 2021-04-06 PROCEDURE — G6017: CPT

## 2021-04-07 ENCOUNTER — NON-APPOINTMENT (OUTPATIENT)
Age: 61
End: 2021-04-07

## 2021-04-07 PROCEDURE — G6017: CPT

## 2021-04-08 ENCOUNTER — NON-APPOINTMENT (OUTPATIENT)
Age: 61
End: 2021-04-08

## 2021-04-08 PROCEDURE — 77427 RADIATION TX MANAGEMENT X5: CPT

## 2021-04-08 PROCEDURE — G6017: CPT

## 2021-04-08 NOTE — PHYSICAL EXAM
[Normal] : well developed, well nourished, in no acute distress [de-identified] : left chest wall with mastectomy scar, mild erythema and hyperpigmentation

## 2021-04-08 NOTE — HISTORY OF PRESENT ILLNESS
[FreeTextEntry1] : Ms. Torres is a 60yo woman with stage IIIC, [id number] ER+PRneg HER2 negative, invasive lobular carcinoma with signet ring cell features, s/p lumpectomy with positive deep margin and ALND showing 5/7 positive lymph nodes; and subsequent bilateral mastectomies showing 5 more positive LNs and LCIS in left breast and LAKISHA. She completed ddACT with Dr. Lucia, and is now receiving post-mastectomy radiation therapy. \par \par She is feeling generally well. She denies fatigue and pain. Minimal discomfort to the skin. She is using Aquaphor on the skin. \par

## 2021-04-08 NOTE — DISEASE MANAGEMENT
[Pathological] : TNM Stage: p [IIIC] : IIIC [TTNM] : 2 [NTNM] : 3 [MTNM] : 0 [de-identified] : [id number] [de-identified] : 5000cGy [de-identified] : left chest wall and nodes

## 2021-04-09 PROCEDURE — G6017: CPT

## 2021-04-12 PROCEDURE — G6017: CPT

## 2021-04-13 ENCOUNTER — NON-APPOINTMENT (OUTPATIENT)
Age: 61
End: 2021-04-13

## 2021-04-13 PROCEDURE — G6017: CPT

## 2021-04-14 ENCOUNTER — NON-APPOINTMENT (OUTPATIENT)
Age: 61
End: 2021-04-14

## 2021-04-14 PROCEDURE — G6017: CPT

## 2021-04-15 ENCOUNTER — NON-APPOINTMENT (OUTPATIENT)
Age: 61
End: 2021-04-15

## 2021-04-15 PROCEDURE — G6017: CPT

## 2021-04-15 PROCEDURE — 77427 RADIATION TX MANAGEMENT X5: CPT

## 2021-04-19 ENCOUNTER — NON-APPOINTMENT (OUTPATIENT)
Age: 61
End: 2021-04-19

## 2021-04-19 ENCOUNTER — OUTPATIENT (OUTPATIENT)
Dept: OUTPATIENT SERVICES | Facility: HOSPITAL | Age: 61
LOS: 1 days | Discharge: HOME | End: 2021-04-19

## 2021-04-19 DIAGNOSIS — Z11.1 ENCOUNTER FOR SCREENING FOR RESPIRATORY TUBERCULOSIS: ICD-10-CM

## 2021-04-19 DIAGNOSIS — Z98.890 OTHER SPECIFIED POSTPROCEDURAL STATES: Chronic | ICD-10-CM

## 2021-04-19 DIAGNOSIS — Z90.710 ACQUIRED ABSENCE OF BOTH CERVIX AND UTERUS: Chronic | ICD-10-CM

## 2021-04-19 DIAGNOSIS — Z90.13 ACQUIRED ABSENCE OF BILATERAL BREASTS AND NIPPLES: Chronic | ICD-10-CM

## 2021-04-19 NOTE — DISEASE MANAGEMENT
[Pathological] : TNM Stage: p [IIIC] : IIIC [TTNM] : 2 [NTNM] : 3 [MTNM] : 0 [de-identified] : 5000cGy [de-identified] : 5000cGy [de-identified] : left chest wall and nodes

## 2021-04-19 NOTE — PHYSICAL EXAM
[Normal] : well developed, well nourished, in no acute distress [de-identified] : left chest wall with mastectomy scar, mild to moderate erythema and hyperpigmentation

## 2021-04-19 NOTE — HISTORY OF PRESENT ILLNESS
[FreeTextEntry1] : Ms. Torres is a 62yo woman with stage IIIC, [id number] ER+PRneg HER2 negative, invasive lobular carcinoma with signet ring cell features, s/p lumpectomy with positive deep margin and ALND showing 5/7 positive lymph nodes; and subsequent bilateral mastectomies showing 5 more positive LNs and LCIS in left breast and LAKISHA. She completed ddACT with Dr. Lucia, and is now receiving post-mastectomy radiation therapy. \par \par She is feeling generally well. She completes radiation today.  She is using Aquaphor on the skin.  She was in touch with Lora, nutritionist, in regards to safe supplements. Slight discomfort noted. Hyperpigmentation in axilla.\par

## 2021-04-21 ENCOUNTER — APPOINTMENT (OUTPATIENT)
Dept: HEMATOLOGY ONCOLOGY | Facility: CLINIC | Age: 61
End: 2021-04-21
Payer: COMMERCIAL

## 2021-04-21 VITALS
BODY MASS INDEX: 30.43 KG/M2 | SYSTOLIC BLOOD PRESSURE: 120 MMHG | WEIGHT: 165.35 LBS | HEIGHT: 61.81 IN | HEART RATE: 76 BPM | OXYGEN SATURATION: 99 % | TEMPERATURE: 97.9 F | DIASTOLIC BLOOD PRESSURE: 76 MMHG | RESPIRATION RATE: 17 BRPM

## 2021-04-21 PROCEDURE — 99214 OFFICE O/P EST MOD 30 MIN: CPT

## 2021-04-21 PROCEDURE — 99072 ADDL SUPL MATRL&STAF TM PHE: CPT

## 2021-04-21 NOTE — PHYSICAL EXAM
[Restricted in physically strenuous activity but ambulatory and able to carry out work of a light or sedentary nature] : Status 1- Restricted in physically strenuous activity but ambulatory and able to carry out work of a light or sedentary nature, e.g., light house work, office work [Normal] : affect appropriate [de-identified] : R sided port C/D/I  [de-identified] : B МАРИНА flap reconstruction with hyperpigmentation over the L reconstruction and ulceration under axilla 2 cm with Silvadene cream over skin  [de-identified] :  strength 5/5 BUE, gait steady

## 2021-04-21 NOTE — HISTORY OF PRESENT ILLNESS
[Disease: _____________________] : Disease: [unfilled] [T: ___] : T[unfilled] [N: ___] : N[unfilled] [AJCC Stage: ____] : AJCC Stage: [unfilled] [de-identified] : Age 60: left axilla biopsy metastatic mammary carcinoma \par Screening mammogram done on 12/7/2019 which showed enlarged left axillary lymph node for which ultrasound guided biopsy was recommended and done on 1/21/2020. Pathology showed metastatic mammary carcinoma with lobular and signet ring cell features that is ER > 90%, [place] 0%, and Kmq7jgv negative. MRI of the breast done 2/13/2020 showed nonmass enhancement in the upper inner left breast measuring 5 cm in AP dimension and abnormal left axillary lymph node with biopsy clip. Also 0.2 cm focus enhancing in the right breast. She underwent MRI guided biopsy of the left breast done on 3/11/2020. We reviewed neoadjuvant chemotherapy which she refused and was on anastrozole while awaiting surgery which was delayed due to covid. She underwent left lumpectomy with sentinel lymph node followed by left axillary lymph node dissection with Dr Hall on 5/5/2020. The pathology showed invasive lobular carcinoma, classic type, Grade 2, invasive component was 2.3 cm and 5 out of 7 lymph nodes positive for carcinoma: T2N2 ER 90%, KY 0%, Jhv1mlt negative. We performed CT of the chest/ abd/ pelvis along with bone scan 5/22/2020 which showed indeterminate liver lesion. She had MRI of the abdomen 6/22/2020 done which showed hemangioma. She underwent prophylactic nipple sparing prophylactic right mastectomy and left mastectomy with additional lymph node removal on 6/30/2020 with Dr Hall. The pathology showed fibrocystic changes in the right prophylactic mastectomy and negative sentinel lymph node. The left mastectomy showed LCIS along with 5 additional positive lymph nodes. She had adjuvant dose dense AC from 9/9/2020 to 10/21/2020 followed by weekly paclitaxel from 11/4/2020 to 1/2021. She started RT with Dr Diehl 3/2021.  [de-identified] : mammary carcinoma ER > 90%, MT 0%, Jek8hsn negative  [de-identified] : dose dense AC 9/9/2020 to 10/21/2020 \par paclitaxel 11/4/2020 to 1/20/2021 [de-identified] : Since RT, she has tenderness over the left reconstruction: has been applying silvadene and moisturizer. She has ulceration under the axilla. She has numbness over the fingertips which makes it difficult to wear clothes with buttons but otherwise able to do all activities. Numbness more noticeable over the fingertips than feet. She has not been taking the alpha lipoic acid but had bought the vitamin. She had her covid vaccine and will complete 2nd injection in few weeks. She will be visiting her family in [facility]. She denies any port tenderness but prefers she has removed than flush every 2 months.

## 2021-04-21 NOTE — CONSULT LETTER
[Dear  ___] : Dear  [unfilled], [Courtesy Letter:] : I had the pleasure of seeing your patient, [unfilled], in my office today. [Please see my note below.] : Please see my note below. [Consult Closing:] : Thank you very much for allowing me to participate in the care of this patient.  If you have any questions, please do not hesitate to contact me. [Sincerely,] : Sincerely, [FreeTextEntry2] : Heidy Hall MD\par [address] [facility] [address]\[place] [address] [FreeTextEntry3] : Richard Lucia MD\par Attending\par [facility]\par  [DrCoral  ___] : Dr. WALLACE

## 2021-04-21 NOTE — ASSESSMENT
[FreeTextEntry1] : She is a 62 y/o AAF Stage III left invasive lobular carcinoma who was on anastrozole while awaiting surgery s/p lumpectomy with axillary lymph node dissection in 5/5/2020 followed by mastectomy 6/30/2020 with additional lymph nodes removed. She completed adjuvant dose dense AC followed by paclitaxel (week 12 held due to worsening neuropathy). 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 We reviewed expectations with Grade 1 neuropathy of the fingertips that may be residual symptom after chemotherapy. We encouraged continued massage of the hands and to take the alpha lipoic acid to see if symptom can continue to improve over the next 6 months. We reviewed re-initiation of anastrozole which she was on previously while awaiting surgery. She had tolerated endocrine therapy but we reviewed potential side effects including but not limited to fatigue, joint stiffness/ pain, hot flashes and GI upset. We reviewed supportive measures to decrease side effects. She understands if any intolerable side effects, we can switch medications. \par \par Will s/w Dr Hall: pt prefers to have port removal. She has completed ddACT and will continue with anastrozole and surveillance. Her counts have recovered for removal. We reviewed her CMP, CA 27-29, and CBC which are WNL after chemotherapy.

## 2021-04-21 NOTE — REVIEW OF SYSTEMS
[Skin Rash] : no skin rash [Skin Wound] : skin wound [Negative] : Allergic/Immunologic [de-identified] : ulceration over the left axilla and hyperpigmentation over the chest wall

## 2021-04-24 ENCOUNTER — APPOINTMENT (OUTPATIENT)
Dept: INFUSION THERAPY | Facility: HOSPITAL | Age: 61
End: 2021-04-24

## 2021-04-24 ENCOUNTER — LABORATORY RESULT (OUTPATIENT)
Age: 61
End: 2021-04-24

## 2021-04-24 ENCOUNTER — RESULT REVIEW (OUTPATIENT)
Age: 61
End: 2021-04-24

## 2021-04-24 LAB
BASOPHILS # BLD AUTO: 0.01 K/UL — SIGNIFICANT CHANGE UP (ref 0–0.2)
BASOPHILS NFR BLD AUTO: 0.3 % — SIGNIFICANT CHANGE UP (ref 0–2)
EOSINOPHIL # BLD AUTO: 0.05 K/UL — SIGNIFICANT CHANGE UP (ref 0–0.5)
EOSINOPHIL NFR BLD AUTO: 1.3 % — SIGNIFICANT CHANGE UP (ref 0–6)
HCT VFR BLD CALC: 33.5 % — LOW (ref 34.5–45)
HGB BLD-MCNC: 10.6 G/DL — LOW (ref 11.5–15.5)
IMM GRANULOCYTES NFR BLD AUTO: 0.8 % — SIGNIFICANT CHANGE UP (ref 0–1.5)
LYMPHOCYTES # BLD AUTO: 0.63 K/UL — LOW (ref 1–3.3)
LYMPHOCYTES # BLD AUTO: 17 % — SIGNIFICANT CHANGE UP (ref 13–44)
MCHC RBC-ENTMCNC: 26 PG — LOW (ref 27–34)
MCHC RBC-ENTMCNC: 31.6 G/DL — LOW (ref 32–36)
MCV RBC AUTO: 82.3 FL — SIGNIFICANT CHANGE UP (ref 80–100)
MONOCYTES # BLD AUTO: 0.51 K/UL — SIGNIFICANT CHANGE UP (ref 0–0.9)
MONOCYTES NFR BLD AUTO: 13.7 % — SIGNIFICANT CHANGE UP (ref 2–14)
NEUTROPHILS # BLD AUTO: 2.48 K/UL — SIGNIFICANT CHANGE UP (ref 1.8–7.4)
NEUTROPHILS NFR BLD AUTO: 66.9 % — SIGNIFICANT CHANGE UP (ref 43–77)
NRBC # BLD: 0 /100 WBCS — SIGNIFICANT CHANGE UP (ref 0–0)
PLATELET # BLD AUTO: 206 K/UL — SIGNIFICANT CHANGE UP (ref 150–400)
RBC # BLD: 4.07 M/UL — SIGNIFICANT CHANGE UP (ref 3.8–5.2)
RBC # FLD: 14.2 % — SIGNIFICANT CHANGE UP (ref 10.3–14.5)
WBC # BLD: 3.71 K/UL — LOW (ref 3.8–10.5)
WBC # FLD AUTO: 3.71 K/UL — LOW (ref 3.8–10.5)

## 2021-04-27 DIAGNOSIS — C50.919 MALIGNANT NEOPLASM OF UNSPECIFIED SITE OF UNSPECIFIED FEMALE BREAST: ICD-10-CM

## 2021-05-06 ENCOUNTER — OUTPATIENT (OUTPATIENT)
Dept: OUTPATIENT SERVICES | Facility: HOSPITAL | Age: 61
LOS: 1 days | End: 2021-05-06
Payer: COMMERCIAL

## 2021-05-06 VITALS
WEIGHT: 162.92 LBS | TEMPERATURE: 98 F | OXYGEN SATURATION: 100 % | RESPIRATION RATE: 12 BRPM | SYSTOLIC BLOOD PRESSURE: 120 MMHG | HEART RATE: 87 BPM | DIASTOLIC BLOOD PRESSURE: 81 MMHG | HEIGHT: 61 IN

## 2021-05-06 DIAGNOSIS — Z90.13 ACQUIRED ABSENCE OF BILATERAL BREASTS AND NIPPLES: Chronic | ICD-10-CM

## 2021-05-06 DIAGNOSIS — G47.33 OBSTRUCTIVE SLEEP APNEA (ADULT) (PEDIATRIC): ICD-10-CM

## 2021-05-06 DIAGNOSIS — Z85.3 PERSONAL HISTORY OF MALIGNANT NEOPLASM OF BREAST: ICD-10-CM

## 2021-05-06 DIAGNOSIS — Z98.890 OTHER SPECIFIED POSTPROCEDURAL STATES: Chronic | ICD-10-CM

## 2021-05-06 DIAGNOSIS — Z90.710 ACQUIRED ABSENCE OF BOTH CERVIX AND UTERUS: Chronic | ICD-10-CM

## 2021-05-06 DIAGNOSIS — Z01.818 ENCOUNTER FOR OTHER PREPROCEDURAL EXAMINATION: ICD-10-CM

## 2021-05-06 LAB
ANION GAP SERPL CALC-SCNC: 15 MMOL/L — SIGNIFICANT CHANGE UP (ref 5–17)
BUN SERPL-MCNC: 17 MG/DL — SIGNIFICANT CHANGE UP (ref 7–23)
CALCIUM SERPL-MCNC: 9.6 MG/DL — SIGNIFICANT CHANGE UP (ref 8.4–10.5)
CHLORIDE SERPL-SCNC: 108 MMOL/L — SIGNIFICANT CHANGE UP (ref 96–108)
CO2 SERPL-SCNC: 20 MMOL/L — LOW (ref 22–31)
CREAT SERPL-MCNC: 0.71 MG/DL — SIGNIFICANT CHANGE UP (ref 0.5–1.3)
GLUCOSE SERPL-MCNC: 80 MG/DL — SIGNIFICANT CHANGE UP (ref 70–99)
HCT VFR BLD CALC: 35.3 % — SIGNIFICANT CHANGE UP (ref 34.5–45)
HGB BLD-MCNC: 11 G/DL — LOW (ref 11.5–15.5)
MCHC RBC-ENTMCNC: 25.7 PG — LOW (ref 27–34)
MCHC RBC-ENTMCNC: 31.2 GM/DL — LOW (ref 32–36)
MCV RBC AUTO: 82.5 FL — SIGNIFICANT CHANGE UP (ref 80–100)
NRBC # BLD: 0 /100 WBCS — SIGNIFICANT CHANGE UP (ref 0–0)
PLATELET # BLD AUTO: 258 K/UL — SIGNIFICANT CHANGE UP (ref 150–400)
POTASSIUM SERPL-MCNC: 4.1 MMOL/L — SIGNIFICANT CHANGE UP (ref 3.5–5.3)
POTASSIUM SERPL-SCNC: 4.1 MMOL/L — SIGNIFICANT CHANGE UP (ref 3.5–5.3)
RBC # BLD: 4.28 M/UL — SIGNIFICANT CHANGE UP (ref 3.8–5.2)
RBC # FLD: 14.2 % — SIGNIFICANT CHANGE UP (ref 10.3–14.5)
SODIUM SERPL-SCNC: 143 MMOL/L — SIGNIFICANT CHANGE UP (ref 135–145)
WBC # BLD: 4.46 K/UL — SIGNIFICANT CHANGE UP (ref 3.8–10.5)
WBC # FLD AUTO: 4.46 K/UL — SIGNIFICANT CHANGE UP (ref 3.8–10.5)

## 2021-05-06 PROCEDURE — 85027 COMPLETE CBC AUTOMATED: CPT

## 2021-05-06 PROCEDURE — 80048 BASIC METABOLIC PNL TOTAL CA: CPT

## 2021-05-06 PROCEDURE — 83036 HEMOGLOBIN GLYCOSYLATED A1C: CPT

## 2021-05-06 PROCEDURE — G0463: CPT

## 2021-05-06 RX ORDER — LIDOCAINE HCL 20 MG/ML
0.2 VIAL (ML) INJECTION ONCE
Refills: 0 | Status: DISCONTINUED | OUTPATIENT
Start: 2021-05-12 | End: 2021-05-26

## 2021-05-06 RX ORDER — SODIUM CHLORIDE 9 MG/ML
3 INJECTION INTRAMUSCULAR; INTRAVENOUS; SUBCUTANEOUS EVERY 8 HOURS
Refills: 0 | Status: DISCONTINUED | OUTPATIENT
Start: 2021-05-12 | End: 2021-05-26

## 2021-05-06 NOTE — H&P PST ADULT - NSICDXPASTMEDICALHX_GEN_ALL_CORE_FT
PAST MEDICAL HISTORY:  Axillary adenopathy     Cancer of left female breast with positive nodes    Cervical ca had + PAP "abnormal cells ? cancer  had hysterectomy 2015    Encounter for central line care     H/O therapeutic radiation     History of prediabetes     Neuropathy s/p chemo    Non-healing surgical wound right breast nipple    Obesity     JOSH (obstructive sleep apnea) CPAP recommended, pt refused

## 2021-05-06 NOTE — H&P PST ADULT - HISTORY OF PRESENT ILLNESS
60 YO M PM breast cancer s/p b/l mastectomy & chemotherapy/radiation, presents to PST for REMOVAL OF RIGHT SUBCLAVIAN MEDIPORT 5/12/2021. Denies any palpitations, SOB, N/V, Covid symptoms (fever, chills, cough) or exposure.     ***Covid test scheduled for 5/9/21 at [facility].  62 YO M PMH breast cancer s/p b/l mastectomy/chemotherapy/radiation, presents to [facility] for REMOVAL OF RIGHT SUBCLAVIAN MEDIPORT 5/12/2021. Denies any palpitations, SOB, N/V, Covid symptoms (fever, chills, cough) or exposure.     ***Covid test scheduled for 5/9/21 at [facility].  62 YO F PMH breast cancer s/p b/l mastectomy, reconstruction, chemotherapy, radiation - presents to [facility] for REMOVAL OF RIGHT SUBCLAVIAN MEDIPORT 5/12/2021. Denies any palpitations, SOB, N/V, Covid symptoms (fever, chills, cough) or exposure.     ***Covid test scheduled for 5/9/21 at [facility].

## 2021-05-06 NOTE — H&P PST ADULT - NSICDXPROBLEM_GEN_ALL_CORE_FT
PROBLEM DIAGNOSES  Problem: Personal history of malignant neoplasm of breast  Assessment and Plan: REMOVAL OF RIGHT SUBCLAVIAN MEDIPORT  Pre-op education provided - all questions answered   Chlorhex soap & instructions given  CBC, BMP, Ha1c sent in PST    Problem: JOSH (obstructive sleep apnea)  Assessment and Plan: OR Booking notified  No device used

## 2021-05-06 NOTE — H&P PST ADULT - BSA (M2)
rosuvastatin (CRESTOR) 10 MG tablet   Last Written Prescription Date:  1/11/18  Last Fill Quantity: 30,   # refills: 1  Last Office Visit: 2/26/18  Future Office visit:    Next 5 appointments (look out 90 days)     Apr 17, 2018  9:40 AM CDT   (Arrive by 9:20 AM)   Return Visit with Baldev Lou DO    ORTHOPEDICS ([facility] )    [address] [facility] [address] [phone]                     
1.73

## 2021-05-06 NOTE — H&P PST ADULT - OTHER CARE PROVIDERS
Richard Lucia (oncology) last visit 4/21/21 Richard Lucia (oncology) [phone] - last visit 4/21/21

## 2021-05-06 NOTE — H&P PST ADULT - NEGATIVE NEUROLOGICAL SYMPTOMS
no generalized seizures/no focal seizures/no syncope/no tremors/no loss of sensation/no difficulty walking/no headache

## 2021-05-06 NOTE — H&P PST ADULT - NSICDXPASTSURGICALHX_GEN_ALL_CORE_FT
PAST SURGICAL HISTORY:  H/O breast biopsy open    S/P bilateral mastectomy 6/30/2020 reconstruction  МАРИНА flap    S/P hysterectomy 2015    S/P LEEP (loop electrosurgical excision procedure)     S/P lumpectomy, left breast May 2020

## 2021-05-07 PROBLEM — Z45.2 ENCOUNTER FOR ADJUSTMENT AND MANAGEMENT OF VASCULAR ACCESS DEVICE: Chronic | Status: ACTIVE | Noted: 2021-05-06

## 2021-05-07 LAB
A1C WITH ESTIMATED AVERAGE GLUCOSE RESULT: 5.5 % — SIGNIFICANT CHANGE UP (ref 4–5.6)
ESTIMATED AVERAGE GLUCOSE: 111 MG/DL — SIGNIFICANT CHANGE UP (ref 68–114)

## 2021-05-09 ENCOUNTER — OUTPATIENT (OUTPATIENT)
Dept: OUTPATIENT SERVICES | Facility: HOSPITAL | Age: 61
LOS: 1 days | End: 2021-05-09
Payer: COMMERCIAL

## 2021-05-09 DIAGNOSIS — Z90.13 ACQUIRED ABSENCE OF BILATERAL BREASTS AND NIPPLES: Chronic | ICD-10-CM

## 2021-05-09 DIAGNOSIS — Z98.890 OTHER SPECIFIED POSTPROCEDURAL STATES: Chronic | ICD-10-CM

## 2021-05-09 DIAGNOSIS — Z11.52 ENCOUNTER FOR SCREENING FOR COVID-19: ICD-10-CM

## 2021-05-09 DIAGNOSIS — Z90.710 ACQUIRED ABSENCE OF BOTH CERVIX AND UTERUS: Chronic | ICD-10-CM

## 2021-05-09 LAB — SARS-COV-2 RNA SPEC QL NAA+PROBE: SIGNIFICANT CHANGE UP

## 2021-05-09 PROCEDURE — U0003: CPT

## 2021-05-09 PROCEDURE — C9803: CPT

## 2021-05-09 PROCEDURE — U0005: CPT

## 2021-05-10 ENCOUNTER — APPOINTMENT (OUTPATIENT)
Dept: HEMATOLOGY ONCOLOGY | Facility: CLINIC | Age: 61
End: 2021-05-10
Payer: COMMERCIAL

## 2021-05-10 ENCOUNTER — NON-APPOINTMENT (OUTPATIENT)
Age: 61
End: 2021-05-10

## 2021-05-10 PROCEDURE — 99443: CPT

## 2021-05-11 ENCOUNTER — TRANSCRIPTION ENCOUNTER (OUTPATIENT)
Age: 61
End: 2021-05-11

## 2021-05-12 ENCOUNTER — OUTPATIENT (OUTPATIENT)
Dept: OUTPATIENT SERVICES | Facility: HOSPITAL | Age: 61
LOS: 1 days | End: 2021-05-12
Payer: COMMERCIAL

## 2021-05-12 VITALS
DIASTOLIC BLOOD PRESSURE: 74 MMHG | RESPIRATION RATE: 16 BRPM | WEIGHT: 162.92 LBS | HEIGHT: 61 IN | OXYGEN SATURATION: 100 % | HEART RATE: 71 BPM | SYSTOLIC BLOOD PRESSURE: 109 MMHG | TEMPERATURE: 97 F

## 2021-05-12 VITALS
HEART RATE: 70 BPM | DIASTOLIC BLOOD PRESSURE: 67 MMHG | OXYGEN SATURATION: 100 % | RESPIRATION RATE: 14 BRPM | SYSTOLIC BLOOD PRESSURE: 123 MMHG | TEMPERATURE: 98 F

## 2021-05-12 DIAGNOSIS — Z90.13 ACQUIRED ABSENCE OF BILATERAL BREASTS AND NIPPLES: Chronic | ICD-10-CM

## 2021-05-12 DIAGNOSIS — Z98.890 OTHER SPECIFIED POSTPROCEDURAL STATES: Chronic | ICD-10-CM

## 2021-05-12 DIAGNOSIS — Z85.3 PERSONAL HISTORY OF MALIGNANT NEOPLASM OF BREAST: ICD-10-CM

## 2021-05-12 DIAGNOSIS — Z90.710 ACQUIRED ABSENCE OF BOTH CERVIX AND UTERUS: Chronic | ICD-10-CM

## 2021-05-12 LAB — GLUCOSE BLDC GLUCOMTR-MCNC: 88 MG/DL — SIGNIFICANT CHANGE UP (ref 70–99)

## 2021-05-12 PROCEDURE — 82962 GLUCOSE BLOOD TEST: CPT

## 2021-05-12 PROCEDURE — 36590 REMOVAL TUNNELED CV CATH: CPT

## 2021-05-12 RX ORDER — ANASTROZOLE 1 MG/1
1 TABLET ORAL
Qty: 0 | Refills: 0 | DISCHARGE

## 2021-05-12 RX ORDER — SODIUM CHLORIDE 9 MG/ML
1000 INJECTION, SOLUTION INTRAVENOUS
Refills: 0 | Status: DISCONTINUED | OUTPATIENT
Start: 2021-05-12 | End: 2021-05-26

## 2021-05-12 RX ORDER — CHLORHEXIDINE GLUCONATE 213 G/1000ML
1 SOLUTION TOPICAL ONCE
Refills: 0 | Status: COMPLETED | OUTPATIENT
Start: 2021-05-12 | End: 2021-05-12

## 2021-05-12 RX ORDER — FENTANYL CITRATE 50 UG/ML
25 INJECTION INTRAVENOUS
Refills: 0 | Status: DISCONTINUED | OUTPATIENT
Start: 2021-05-12 | End: 2021-05-12

## 2021-05-12 RX ORDER — ONDANSETRON 8 MG/1
4 TABLET, FILM COATED ORAL ONCE
Refills: 0 | Status: DISCONTINUED | OUTPATIENT
Start: 2021-05-12 | End: 2021-05-26

## 2021-05-12 RX ADMIN — CHLORHEXIDINE GLUCONATE 1 APPLICATION(S): 213 SOLUTION TOPICAL at 09:16

## 2021-05-12 NOTE — ASU DISCHARGE PLAN (ADULT/PEDIATRIC) - NURSING INSTRUCTIONS
Next dose of Tylenol will be on or after ___4:38 PM________ ,today/tonight and every 6 hours afterwards as needed for pain management, do not take any Tylenol containing products until this time. Your first dose of Tylenol was given at _______10:38 AM____. Do not exceed more than 4000mg of Tylenol in one 24 hour setting. If no contraindications, you may alternate with Ibuprofen  3 hours after dose of Tylenol. Ibuprofen can be taken every 6 hours.

## 2021-05-12 NOTE — ASU DISCHARGE PLAN (ADULT/PEDIATRIC) - CALL YOUR DOCTOR IF YOU HAVE ANY OF THE FOLLOWING:
Bleeding that does not stop/Swelling that gets worse/Pain not relieved by Medications/Fever greater than (need to indicate Fahrenheit or Celsius) Bleeding that does not stop/Swelling that gets worse/Pain not relieved by Medications/Fever greater than (need to indicate Fahrenheit or Celsius)/Wound/Surgical Site with redness, or foul smelling discharge or pus/Nausea and vomiting that does not stop/Unable to urinate/Inability to tolerate liquids or foods

## 2021-05-12 NOTE — ASU DISCHARGE PLAN (ADULT/PEDIATRIC) - CARE PROVIDER_API CALL
Heidy Hall  SURGERY  [address] [place] [address], [address]  [place] [address]  Phone: [phone]  Fax: [phone]  Follow Up Time: 2 weeks

## 2021-05-12 NOTE — ASU PATIENT PROFILE, ADULT - PMH
Axillary adenopathy    Cancer of left female breast  with positive nodes  Cervical ca  had + PAP "abnormal cells ? cancer  had hysterectomy 2015  Encounter for central line care    H/O therapeutic radiation    History of prediabetes    Neuropathy  s/p chemo  Non-healing surgical wound  right breast nipple  Obesity    JOSH (obstructive sleep apnea)  CPAP recommended, pt refused

## 2021-05-25 ENCOUNTER — APPOINTMENT (OUTPATIENT)
Dept: RADIATION ONCOLOGY | Facility: CLINIC | Age: 61
End: 2021-05-25
Payer: COMMERCIAL

## 2021-05-26 NOTE — ASU PREOP CHECKLIST - AICD PRESENT
no Ivermectin Counseling:  Patient instructed to take medication on an empty stomach with a full glass of water.  Patient informed of potential adverse effects including but not limited to nausea, diarrhea, dizziness, itching, and swelling of the extremities or lymph nodes.  The patient verbalized understanding of the proper use and possible adverse effects of ivermectin.  All of the patient's questions and concerns were addressed.

## 2021-06-02 ENCOUNTER — APPOINTMENT (OUTPATIENT)
Dept: RADIATION ONCOLOGY | Facility: CLINIC | Age: 61
End: 2021-06-02
Payer: COMMERCIAL

## 2021-06-02 ENCOUNTER — NON-APPOINTMENT (OUTPATIENT)
Age: 61
End: 2021-06-02

## 2021-06-02 VITALS
TEMPERATURE: 97.2 F | SYSTOLIC BLOOD PRESSURE: 125 MMHG | WEIGHT: 165.78 LBS | OXYGEN SATURATION: 99 % | RESPIRATION RATE: 17 BRPM | DIASTOLIC BLOOD PRESSURE: 78 MMHG | HEART RATE: 80 BPM | BODY MASS INDEX: 30.51 KG/M2

## 2021-06-02 PROCEDURE — 99024 POSTOP FOLLOW-UP VISIT: CPT

## 2021-06-02 NOTE — REVIEW OF SYSTEMS
[Anxiety] : anxiety [Negative] : Constitutional [Alopecia: Grade 0] : Alopecia: Grade 0 [Pruritus: Grade 0] : Pruritus: Grade 0 [Skin Atrophy: Grade 0] : Skin Atrophy: Grade 0 [Skin Hyperpigmentation: Grade 1 - Hyperpigmentation covering <10% BSA; no psychosocial impact] : Skin Hyperpigmentation: Grade 1 - Hyperpigmentation covering <10% BSA; no psychosocial impact [Skin Induration: Grade 0] : Skin Induration: Grade 0 [Dermatitis Radiation: Grade 0] : Dermatitis Radiation: Grade 0

## 2021-06-03 PROBLEM — G62.9 POLYNEUROPATHY, UNSPECIFIED: Chronic | Status: ACTIVE | Noted: 2021-05-06

## 2021-06-03 PROBLEM — Z87.898 PERSONAL HISTORY OF OTHER SPECIFIED CONDITIONS: Chronic | Status: ACTIVE | Noted: 2021-05-06

## 2021-06-03 PROBLEM — Z92.3 PERSONAL HISTORY OF IRRADIATION: Chronic | Status: ACTIVE | Noted: 2021-05-06

## 2021-06-04 NOTE — HISTORY OF PRESENT ILLNESS
[FreeTextEntry1] : Ms. Torres is a 62yo woman with stage IIIC, [id number] ER+PRneg HER2 negative, invasive lobular carcinoma with signet ring cell features, s/p lumpectomy with positive deep margin and ALND showing 5/7 positive lymph nodes; and subsequent bilateral mastectomies showing 5 more positive LNs and LCIS in left breast and LAKISHA. She completed ddACT with Dr. Lucia, and has now completed post-mastectomy radiation therapy, a dose of 5,000 cGy on 4/15/2021.\par \par The patient reports feeling generally improved.  She is involved in her usual activities.  She was prescribed Silvadene 1% cream at the completion of the radiation for desquamation.  Her skin is much improved.  She is taking anastrazole per Dr. Lucia.  She continues to have neuropathy, mainly in her hands, tingling and numbness. She also noted weakness of the left hand. She reports that somethings she drops things. She is planning to start physical therapy for her hands. \par

## 2021-06-04 NOTE — PHYSICAL EXAM
[] : no respiratory distress [Normal] : well developed, well nourished, in no acute distress [Sclera] : the sclera and conjunctiva were normal [Heart Rate And Rhythm] : heart rate and rhythm were normal [Abdomen Soft] : soft [Nondistended] : nondistended [Supraclavicular Lymph Nodes Enlarged Bilaterally] : supraclavicular [Axillary Lymph Nodes Enlarged Bilaterally] : axillary [de-identified] : bilateral breast reconstruction, patchy left sided hyperpigmentation, left UE mild edema [de-identified] : mildly reduced strength in left hand  compared to right

## 2021-06-16 ENCOUNTER — APPOINTMENT (OUTPATIENT)
Dept: PHYSICAL MEDICINE AND REHAB | Facility: CLINIC | Age: 61
End: 2021-06-16
Payer: COMMERCIAL

## 2021-06-16 PROCEDURE — 99072 ADDL SUPL MATRL&STAF TM PHE: CPT

## 2021-06-16 PROCEDURE — 99204 OFFICE O/P NEW MOD 45 MIN: CPT

## 2021-06-16 NOTE — HISTORY OF PRESENT ILLNESS
[FreeTextEntry1] : Ms. 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 She completed ddACT with Dr. Lucia, and has now completed post-mastectomy radiation therapy, a dose of 5,000 cGy on 4/15/2021. Referred by Dr. Halina Diehl for physical therapy and lymphedema.\par \par Pt has hand numbness and weakness bilaterally, left worse than right. Pt has attended one session of lymphedema therapy, but it was while she was doing radiation and was too many appointments for her to continue. Pt describes the neuropathy as tingling and weakness in the fingers, mainly in the left. The numbness and tingling started about 2 months prior and has been progressively worsening. She reports mild swelling of her left arm compared to the right. No reported pain in the arm or axilla, but left arm is mildly more sensitive and tight and she reports heaviness sensation.\par \par Pt lives at home with her  and son. There are a few stairs to enter, but she has no difficulty ambulating.

## 2021-06-16 NOTE — ASSESSMENT
[FreeTextEntry1] : Ms. 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 She completed ddACT with Dr. Lucia, and has now completed post-mastectomy radiation therapy, a dose of 5,000 cGy on 4/15/2021. Referred by Dr. Halina Diehl for physical therapy and lymphedema.\par \par Recommend:\par 1. LUE dopplers ordered to r/o dvt\par 1. Start lymphedema therapy for left upper extremity edema\par 2. Will order breast cancer physical therapy in the future after she completes lymphedema therapy sessions\par 3. Return to office in 4-6 weeks for follow up

## 2021-06-16 NOTE — PHYSICAL EXAM
[FreeTextEntry1] : Constitutional: Well-nourished, well-developed, No acute distress\par Respiratory: Good respiratory effort, no SOB\par Lymphatic: 10cm below Left elbow 25cm, 10cm below Right elbow 25cm.10cm above Left elbow 31cm, 10cm above Right elbow 29cm. \par Psychiatric: Pleasant and normal affect, alert and oriented x3\par Skin: Clean dry and intact B/L UE/LE\par Musculoskeletal: mildly limited ROM left shoulder (with tightness)\par Neurologic: mild numbness at bilateral hands (left worse than right), Motor strength LUE 4+/5, RUE 5/5, LLE 5/5, RLE 5/5\par

## 2021-06-16 NOTE — REVIEW OF SYSTEMS
[Negative] : Psychiatric [FreeTextEntry9] : +limited shoulder ROM [de-identified] : +tingling  [de-identified] : mild lymphedema left arm compared to right

## 2021-07-23 ENCOUNTER — OUTPATIENT (OUTPATIENT)
Dept: OUTPATIENT SERVICES | Facility: HOSPITAL | Age: 61
LOS: 1 days | Discharge: HOME | End: 2021-07-23

## 2021-07-23 DIAGNOSIS — Z90.710 ACQUIRED ABSENCE OF BOTH CERVIX AND UTERUS: Chronic | ICD-10-CM

## 2021-07-23 DIAGNOSIS — Z11.1 ENCOUNTER FOR SCREENING FOR RESPIRATORY TUBERCULOSIS: ICD-10-CM

## 2021-07-23 DIAGNOSIS — Z98.890 OTHER SPECIFIED POSTPROCEDURAL STATES: Chronic | ICD-10-CM

## 2021-07-23 DIAGNOSIS — Z90.13 ACQUIRED ABSENCE OF BILATERAL BREASTS AND NIPPLES: Chronic | ICD-10-CM

## 2021-07-26 ENCOUNTER — RESULT REVIEW (OUTPATIENT)
Age: 61
End: 2021-07-26

## 2021-07-26 ENCOUNTER — APPOINTMENT (OUTPATIENT)
Dept: HEMATOLOGY ONCOLOGY | Facility: CLINIC | Age: 61
End: 2021-07-26
Payer: COMMERCIAL

## 2021-07-26 VITALS
BODY MASS INDEX: 30.35 KG/M2 | DIASTOLIC BLOOD PRESSURE: 85 MMHG | WEIGHT: 164.91 LBS | HEIGHT: 61.81 IN | TEMPERATURE: 97.5 F | HEART RATE: 82 BPM | OXYGEN SATURATION: 99 % | SYSTOLIC BLOOD PRESSURE: 122 MMHG | RESPIRATION RATE: 16 BRPM

## 2021-07-26 LAB
BASOPHILS # BLD AUTO: 0 K/UL — SIGNIFICANT CHANGE UP (ref 0–0.2)
BASOPHILS NFR BLD AUTO: 0 % — SIGNIFICANT CHANGE UP (ref 0–2)
EOSINOPHIL # BLD AUTO: 0.04 K/UL — SIGNIFICANT CHANGE UP (ref 0–0.5)
EOSINOPHIL NFR BLD AUTO: 0.7 % — SIGNIFICANT CHANGE UP (ref 0–6)
HCT VFR BLD CALC: 35.3 % — SIGNIFICANT CHANGE UP (ref 34.5–45)
HGB BLD-MCNC: 11.1 G/DL — LOW (ref 11.5–15.5)
IMM GRANULOCYTES NFR BLD AUTO: 0.5 % — SIGNIFICANT CHANGE UP (ref 0–1.5)
LYMPHOCYTES # BLD AUTO: 1.15 K/UL — SIGNIFICANT CHANGE UP (ref 1–3.3)
LYMPHOCYTES # BLD AUTO: 20.4 % — SIGNIFICANT CHANGE UP (ref 13–44)
MCHC RBC-ENTMCNC: 26 PG — LOW (ref 27–34)
MCHC RBC-ENTMCNC: 31.4 G/DL — LOW (ref 32–36)
MCV RBC AUTO: 82.7 FL — SIGNIFICANT CHANGE UP (ref 80–100)
MONOCYTES # BLD AUTO: 0.53 K/UL — SIGNIFICANT CHANGE UP (ref 0–0.9)
MONOCYTES NFR BLD AUTO: 9.4 % — SIGNIFICANT CHANGE UP (ref 2–14)
NEUTROPHILS # BLD AUTO: 3.9 K/UL — SIGNIFICANT CHANGE UP (ref 1.8–7.4)
NEUTROPHILS NFR BLD AUTO: 69 % — SIGNIFICANT CHANGE UP (ref 43–77)
NRBC # BLD: 0 /100 WBCS — SIGNIFICANT CHANGE UP (ref 0–0)
PLATELET # BLD AUTO: 208 K/UL — SIGNIFICANT CHANGE UP (ref 150–400)
RBC # BLD: 4.27 M/UL — SIGNIFICANT CHANGE UP (ref 3.8–5.2)
RBC # FLD: 13.7 % — SIGNIFICANT CHANGE UP (ref 10.3–14.5)
WBC # BLD: 5.65 K/UL — SIGNIFICANT CHANGE UP (ref 3.8–10.5)
WBC # FLD AUTO: 5.65 K/UL — SIGNIFICANT CHANGE UP (ref 3.8–10.5)

## 2021-07-26 PROCEDURE — 99072 ADDL SUPL MATRL&STAF TM PHE: CPT

## 2021-07-26 PROCEDURE — 99214 OFFICE O/P EST MOD 30 MIN: CPT

## 2021-07-26 NOTE — REVIEW OF SYSTEMS
[Abdominal Pain] : no abdominal pain [Vomiting] : no vomiting [Constipation] : constipation [Diarrhea] : no diarrhea [Skin Rash] : no skin rash [Skin Wound] : no skin wound [Confused] : no confusion [Dizziness] : no dizziness [Fainting] : no fainting [Difficulty Walking] : no difficulty walking [Negative] : Allergic/Immunologic [de-identified] : pt with darkness over the L reconstruction [de-identified] : neuropathy over the L hand

## 2021-07-26 NOTE — ASSESSMENT
[FreeTextEntry1] : She is a 60 y/o AAF Stage III left invasive lobular carcinoma who was on anastrozole while awaiting surgery s/p lumpectomy with axillary lymph node dissection in 5/5/2020 followed by mastectomy 6/30/2020 with additional lymph nodes removed. She completed adjuvant dose dense AC followed by paclitaxel (week 12 held due to worsening neuropathy). She has completed RT with Dr Diehl and on anastrozole and tolerating medication. 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 We reviewed duloxetine once a day and slow titration to help symptoms. We reviewed continued massage and alpha lipoic acid to take daily. We reviewed her anastrozole and she will continue on endocrine therapy. Questions answered to her satisfaction. Next follow up in 3 months but earlier if any new symptoms.\par

## 2021-07-26 NOTE — HISTORY OF PRESENT ILLNESS
[Disease: _____________________] : Disease: [unfilled] [T: ___] : T[unfilled] [N: ___] : N[unfilled] [AJCC Stage: ____] : AJCC Stage: [unfilled] [de-identified] : Age 60: left axilla biopsy metastatic mammary carcinoma \par Screening mammogram done on 12/7/2019 which showed enlarged left axillary lymph node for which ultrasound guided biopsy was recommended and done on 1/21/2020. Pathology showed metastatic mammary carcinoma with lobular and signet ring cell features that is ER > 90%, [place] 0%, and [id number] negative. MRI of the breast done 2/13/2020 showed nonmass enhancement in the upper inner left breast measuring 5 cm in AP dimension and abnormal left axillary lymph node with biopsy clip. Also 0.2 cm focus enhancing in the right breast. She underwent MRI guided biopsy of the left breast done on 3/11/2020. We reviewed neoadjuvant chemotherapy which she refused and was on anastrozole while awaiting surgery which was delayed due to covid. She underwent left lumpectomy with sentinel lymph node followed by left axillary lymph node dissection with Dr Hall on 5/5/2020. The pathology showed invasive lobular carcinoma, classic type, Grade 2, invasive component was 2.3 cm and 5 out of 7 lymph nodes positive for carcinoma: T2N2 ER 90%, [place] 0%, Raj2pdk negative. We performed CT of the chest/ abd/ pelvis along with bone scan 5/22/2020 which showed indeterminate liver lesion. She had MRI of the abdomen 6/22/2020 done which showed hemangioma. She underwent prophylactic nipple sparing prophylactic right mastectomy and left mastectomy with additional lymph node removal on 6/30/2020 with Dr Hall. The pathology showed fibrocystic changes in the right prophylactic mastectomy and negative sentinel lymph node. The left mastectomy showed LCIS along with 5 additional positive lymph nodes. She had adjuvant dose dense AC from 9/9/2020 to 10/21/2020 followed by weekly paclitaxel from 11/4/2020 to 1/2021. She started RT with Dr Diehl 3/2021.  [de-identified] : mammary carcinoma ER > 90%, [place] 0%, Xay6iqv negative  [de-identified] : dose dense AC 9/9/2020 to 10/21/2020 \par paclitaxel 11/4/2020 to 1/20/2021\par anastrozole 6/2021 to present  [de-identified] : Has been having Left hand neuropathy: rates 10/10 discomfort. Can't put on ear rings or tying shoelaces. She has been taking alpha lipoic acid but not helping. She denies any new cough or back pain. She has been tolerating anastrozole without any new symptoms. She is taking Linzess for constipation. No other new medications. Has been getting PT to the E lymphedema. She is worried about lymphedema.

## 2021-07-26 NOTE — PHYSICAL EXAM
[Fully active, able to carry on all pre-disease performance without restriction] : Status 0 - Fully active, able to carry on all pre-disease performance without restriction [Normal] : affect appropriate [de-identified] : R sided port C/D/I  [de-identified] :  strength 5/5 BUE, gait steady  [de-identified] : B МАРИНА flap reconstruction with hyperpigmentation

## 2021-07-26 NOTE — CONSULT LETTER
[Dear  ___] : Dear  [unfilled], [Courtesy Letter:] : I had the pleasure of seeing your patient, [unfilled], in my office today. [Please see my note below.] : Please see my note below. [Consult Closing:] : Thank you very much for allowing me to participate in the care of this patient.  If you have any questions, please do not hesitate to contact me. [Sincerely,] : Sincerely, [FreeTextEntry2] : Heidy Hall MD\par [address] [facility] [address]\[place] [address] [FreeTextEntry3] : Richard Lucia MD\par Attending\par [facility]\par  [DrCoral  ___] : Dr. WALLACE

## 2021-07-27 LAB
ALBUMIN SERPL ELPH-MCNC: 4.2 G/DL
ALP BLD-CCNC: 84 U/L
ALT SERPL-CCNC: 10 U/L
ANION GAP SERPL CALC-SCNC: 14 MMOL/L
AST SERPL-CCNC: 18 U/L
BILIRUB SERPL-MCNC: 0.5 MG/DL
BUN SERPL-MCNC: 17 MG/DL
CALCIUM SERPL-MCNC: 9.9 MG/DL
CANCER AG27-29 SERPL-ACNC: 24.9 U/ML
CHLORIDE SERPL-SCNC: 105 MMOL/L
CHOLEST SERPL-MCNC: 233 MG/DL
CO2 SERPL-SCNC: 21 MMOL/L
CREAT SERPL-MCNC: 0.74 MG/DL
GLUCOSE SERPL-MCNC: 91 MG/DL
HDLC SERPL-MCNC: 55 MG/DL
LDLC SERPL CALC-MCNC: 156 MG/DL
NONHDLC SERPL-MCNC: 177 MG/DL
POTASSIUM SERPL-SCNC: 4.1 MMOL/L
PROT SERPL-MCNC: 6.7 G/DL
SODIUM SERPL-SCNC: 140 MMOL/L
TRIGL SERPL-MCNC: 107 MG/DL

## 2021-08-23 ENCOUNTER — RX RENEWAL (OUTPATIENT)
Age: 61
End: 2021-08-23

## 2021-10-18 ENCOUNTER — APPOINTMENT (OUTPATIENT)
Dept: HEMATOLOGY ONCOLOGY | Facility: CLINIC | Age: 61
End: 2021-10-18

## 2021-10-22 ENCOUNTER — OUTPATIENT (OUTPATIENT)
Dept: OUTPATIENT SERVICES | Facility: HOSPITAL | Age: 61
LOS: 1 days | Discharge: HOME | End: 2021-10-22

## 2021-10-22 DIAGNOSIS — Z98.890 OTHER SPECIFIED POSTPROCEDURAL STATES: Chronic | ICD-10-CM

## 2021-10-22 DIAGNOSIS — Z90.13 ACQUIRED ABSENCE OF BILATERAL BREASTS AND NIPPLES: Chronic | ICD-10-CM

## 2021-10-22 DIAGNOSIS — Z90.710 ACQUIRED ABSENCE OF BOTH CERVIX AND UTERUS: Chronic | ICD-10-CM

## 2021-10-22 DIAGNOSIS — Z11.1 ENCOUNTER FOR SCREENING FOR RESPIRATORY TUBERCULOSIS: ICD-10-CM

## 2021-10-25 ENCOUNTER — APPOINTMENT (OUTPATIENT)
Dept: HEMATOLOGY ONCOLOGY | Facility: CLINIC | Age: 61
End: 2021-10-25
Payer: COMMERCIAL

## 2021-10-25 ENCOUNTER — RESULT REVIEW (OUTPATIENT)
Age: 61
End: 2021-10-25

## 2021-10-25 VITALS
SYSTOLIC BLOOD PRESSURE: 128 MMHG | HEIGHT: 61.81 IN | DIASTOLIC BLOOD PRESSURE: 76 MMHG | OXYGEN SATURATION: 97 % | WEIGHT: 171.96 LBS | HEART RATE: 84 BPM | RESPIRATION RATE: 16 BRPM | BODY MASS INDEX: 31.64 KG/M2 | TEMPERATURE: 97.3 F

## 2021-10-25 DIAGNOSIS — Z79.811 LONG TERM (CURRENT) USE OF AROMATASE INHIBITORS: ICD-10-CM

## 2021-10-25 DIAGNOSIS — Z79.899 OTHER LONG TERM (CURRENT) DRUG THERAPY: ICD-10-CM

## 2021-10-25 LAB
BASOPHILS # BLD AUTO: 0.01 K/UL — SIGNIFICANT CHANGE UP (ref 0–0.2)
BASOPHILS NFR BLD AUTO: 0.2 % — SIGNIFICANT CHANGE UP (ref 0–2)
EOSINOPHIL # BLD AUTO: 0.13 K/UL — SIGNIFICANT CHANGE UP (ref 0–0.5)
EOSINOPHIL NFR BLD AUTO: 2.1 % — SIGNIFICANT CHANGE UP (ref 0–6)
HCT VFR BLD CALC: 37.5 % — SIGNIFICANT CHANGE UP (ref 34.5–45)
HGB BLD-MCNC: 11.9 G/DL — SIGNIFICANT CHANGE UP (ref 11.5–15.5)
IMM GRANULOCYTES NFR BLD AUTO: 0.8 % — SIGNIFICANT CHANGE UP (ref 0–1.5)
LYMPHOCYTES # BLD AUTO: 1.55 K/UL — SIGNIFICANT CHANGE UP (ref 1–3.3)
LYMPHOCYTES # BLD AUTO: 25 % — SIGNIFICANT CHANGE UP (ref 13–44)
MCHC RBC-ENTMCNC: 26.6 PG — LOW (ref 27–34)
MCHC RBC-ENTMCNC: 31.7 G/DL — LOW (ref 32–36)
MCV RBC AUTO: 83.9 FL — SIGNIFICANT CHANGE UP (ref 80–100)
MONOCYTES # BLD AUTO: 0.57 K/UL — SIGNIFICANT CHANGE UP (ref 0–0.9)
MONOCYTES NFR BLD AUTO: 9.2 % — SIGNIFICANT CHANGE UP (ref 2–14)
NEUTROPHILS # BLD AUTO: 3.9 K/UL — SIGNIFICANT CHANGE UP (ref 1.8–7.4)
NEUTROPHILS NFR BLD AUTO: 62.7 % — SIGNIFICANT CHANGE UP (ref 43–77)
NRBC # BLD: 0 /100 WBCS — SIGNIFICANT CHANGE UP (ref 0–0)
PLATELET # BLD AUTO: 228 K/UL — SIGNIFICANT CHANGE UP (ref 150–400)
RBC # BLD: 4.47 M/UL — SIGNIFICANT CHANGE UP (ref 3.8–5.2)
RBC # FLD: 13.9 % — SIGNIFICANT CHANGE UP (ref 10.3–14.5)
WBC # BLD: 6.21 K/UL — SIGNIFICANT CHANGE UP (ref 3.8–10.5)
WBC # FLD AUTO: 6.21 K/UL — SIGNIFICANT CHANGE UP (ref 3.8–10.5)

## 2021-10-25 PROCEDURE — 99214 OFFICE O/P EST MOD 30 MIN: CPT

## 2021-10-26 PROBLEM — Z79.811 USE OF ANASTROZOLE: Status: ACTIVE | Noted: 2021-10-26

## 2021-10-26 PROBLEM — Z79.899 ON ANTINEOPLASTIC CHEMOTHERAPY: Status: RESOLVED | Noted: 2020-09-10 | Resolved: 2021-10-26

## 2021-10-26 LAB
CANCER AG27-29 SERPL-ACNC: 30.2 U/ML
VZV AB TITR SER: POSITIVE
VZV IGG SER IF-ACNC: 2391 INDEX

## 2021-10-26 RX ORDER — LIDOCAINE AND PRILOCAINE 25; 25 MG/G; MG/G
2.5-2.5 CREAM TOPICAL
Qty: 1 | Refills: 3 | Status: DISCONTINUED | COMMUNITY
Start: 2020-08-20 | End: 2021-10-26

## 2021-10-26 NOTE — PHYSICAL EXAM
[Fully active, able to carry on all pre-disease performance without restriction] : Status 0 - Fully active, able to carry on all pre-disease performance without restriction [Normal] : affect appropriate [de-identified] : B МАРИНА flap reconstruction with hyperpigmentation ; No palpable masses b/l, no chest wall changes, no skin or nipple changes noted; no axillary adenopathy [de-identified] : left upper extremity mild swelling c/w right forearm/hand [de-identified] :  strength 5/5 BUE, gait steady

## 2021-10-26 NOTE — HISTORY OF PRESENT ILLNESS
[Disease: _____________________] : Disease: [unfilled] [T: ___] : T[unfilled] [N: ___] : N[unfilled] [AJCC Stage: ____] : AJCC Stage: [unfilled] [de-identified] : Age 60: left axilla biopsy metastatic mammary carcinoma \par Screening mammogram done on 12/7/2019 which showed enlarged left axillary lymph node for which ultrasound guided biopsy was recommended and done on 1/21/2020. Pathology showed metastatic mammary carcinoma with lobular and signet ring cell features that is ER > 90%, [place] 0%, and [id number] negative. MRI of the breast done 2/13/2020 showed nonmass enhancement in the upper inner left breast measuring 5 cm in AP dimension and abnormal left axillary lymph node with biopsy clip. Also 0.2 cm focus enhancing in the right breast. She underwent MRI guided biopsy of the left breast done on 3/11/2020. We reviewed neoadjuvant chemotherapy which she refused and was on anastrozole while awaiting surgery which was delayed due to covid. She underwent left lumpectomy with sentinel lymph node followed by left axillary lymph node dissection with Dr Hall on 5/5/2020. The pathology showed invasive lobular carcinoma, classic type, Grade 2, invasive component was 2.3 cm and 5 out of 7 lymph nodes positive for carcinoma: T2N2 ER 90%, [place] 0%, Xqw6rnh negative. We performed CT of the chest/ abd/ pelvis along with bone scan 5/22/2020 which showed indeterminate liver lesion. She had MRI of the abdomen 6/22/2020 done which showed hemangioma. She underwent prophylactic nipple sparing prophylactic right mastectomy and left mastectomy with additional lymph node removal on 6/30/2020 with Dr Hall. The pathology showed fibrocystic changes in the right prophylactic mastectomy and negative sentinel lymph node. The left mastectomy showed LCIS along with 5 additional positive lymph nodes. She had adjuvant dose dense AC from 9/9/2020 to 10/21/2020 followed by weekly paclitaxel from 11/4/2020 to 1/2021. She started RT with Dr Diehl 3/2021.  [de-identified] : mammary carcinoma ER > 90%, [place] 0%, Gip6giw negative  [de-identified] : dose dense AC 9/9/2020 to 10/21/2020 \par paclitaxel 11/4/2020 to 1/20/2021\par anastrozole 6/2021 to present  [de-identified] : 10/25/21\par Patient presents today to rule out metastatic breast cancer and assess treatment toxicity - anastrozole\par patient w/ residual neuropathy related to chemotherapy - she did not start cymbalta and stopped alpha lipoic acid; Still painful and interferes with ADL, but patient feels she has adjusted to managing.\par Patient denies any SOB, CP, abdominal pain, bone pain or headache.\par Patient denies any hotflashes, arthralgias, vaginal dryness, vaginal bleeding, hair loss, muscle cramps.\par + lymphedema - see [facility] - but hasn't seen in a while and is concerned about lymphedema\par \par .

## 2021-10-26 NOTE — REVIEW OF SYSTEMS
[Constipation] : constipation [Negative] : Allergic/Immunologic [Abdominal Pain] : no abdominal pain [Vomiting] : no vomiting [Diarrhea] : no diarrhea [Skin Rash] : no skin rash [Skin Wound] : no skin wound [Confused] : no confusion [Dizziness] : no dizziness [Fainting] : no fainting [Difficulty Walking] : no difficulty walking [de-identified] : pt with darkness over the L reconstruction [de-identified] : neuropathy L hand  [de-identified] : left upper extremity edema

## 2021-10-26 NOTE — ASSESSMENT
[FreeTextEntry1] : She is a 60 y/o AAF Stage III left invasive lobular carcinoma who was on anastrozole while awaiting surgery s/p lumpectomy with axillary lymph node dissection in 5/5/2020 followed by mastectomy 6/30/2020 with additional lymph nodes removed. She completed adjuvant dose dense AC followed by paclitaxel (week 12 held due to worsening neuropathy). She has completed RT with Dr Diehl and on anastrozole and tolerating medication. \par \par - KARMA x 1 year\par - on endocrine therapy since 4/2021\par - continue anastrozole 1 mg daily \par - continue observation for recurrence\par - Baseline Bone density ordered\par \par Grade 3 Neuropathy - ongoing\par - encouraged patient to resume alpha lipoic acid and consider starting duloxetine as previously discussed\par \par Lymphedema - Mild\par - encouraged f/up with [facility] - RX provided\par \par HMD\par - patient referred for internal medicine MD to establish care - patient does not a primary care provider\par \par Patient had an opportunity to have her questions asked and answered to her satisfaction.\par \par f/up 4 months

## 2021-10-26 NOTE — END OF VISIT
[Time Spent: ___ minutes] : I have spent [unfilled] minutes of time on the encounter. [FreeTextEntry3] : Patient being seen as per physician's primary plan of care.\par \par

## 2021-10-27 LAB
ALBUMIN SERPL ELPH-MCNC: 4.4 G/DL
ALP BLD-CCNC: 150 U/L
ALT SERPL-CCNC: 19 U/L
ANION GAP SERPL CALC-SCNC: 20 MMOL/L
AST SERPL-CCNC: 25 U/L
BILIRUB SERPL-MCNC: 0.7 MG/DL
BUN SERPL-MCNC: 21 MG/DL
CALCIUM SERPL-MCNC: 9.7 MG/DL
CHLORIDE SERPL-SCNC: 106 MMOL/L
CO2 SERPL-SCNC: 18 MMOL/L
CREAT SERPL-MCNC: 1.06 MG/DL
GLUCOSE SERPL-MCNC: 96 MG/DL
POTASSIUM SERPL-SCNC: 4.8 MMOL/L
PROT SERPL-MCNC: 7.1 G/DL
SODIUM SERPL-SCNC: 145 MMOL/L

## 2022-02-23 ENCOUNTER — OUTPATIENT (OUTPATIENT)
Dept: OUTPATIENT SERVICES | Facility: HOSPITAL | Age: 62
LOS: 1 days | Discharge: HOME | End: 2022-02-23

## 2022-02-23 DIAGNOSIS — Z11.1 ENCOUNTER FOR SCREENING FOR RESPIRATORY TUBERCULOSIS: ICD-10-CM

## 2022-02-23 DIAGNOSIS — Z98.890 OTHER SPECIFIED POSTPROCEDURAL STATES: Chronic | ICD-10-CM

## 2022-02-23 DIAGNOSIS — Z90.13 ACQUIRED ABSENCE OF BILATERAL BREASTS AND NIPPLES: Chronic | ICD-10-CM

## 2022-02-23 DIAGNOSIS — Z90.710 ACQUIRED ABSENCE OF BOTH CERVIX AND UTERUS: Chronic | ICD-10-CM

## 2022-02-25 ENCOUNTER — RESULT REVIEW (OUTPATIENT)
Age: 62
End: 2022-02-25

## 2022-02-25 ENCOUNTER — APPOINTMENT (OUTPATIENT)
Dept: HEMATOLOGY ONCOLOGY | Facility: CLINIC | Age: 62
End: 2022-02-25
Payer: COMMERCIAL

## 2022-02-25 VITALS
HEIGHT: 61.81 IN | OXYGEN SATURATION: 99 % | BODY MASS INDEX: 33.06 KG/M2 | WEIGHT: 179.65 LBS | RESPIRATION RATE: 17 BRPM | TEMPERATURE: 96.8 F | DIASTOLIC BLOOD PRESSURE: 76 MMHG | SYSTOLIC BLOOD PRESSURE: 118 MMHG

## 2022-02-25 DIAGNOSIS — R06.02 SHORTNESS OF BREATH: ICD-10-CM

## 2022-02-25 DIAGNOSIS — R07.89 OTHER CHEST PAIN: ICD-10-CM

## 2022-02-25 DIAGNOSIS — F32.A DEPRESSION, UNSPECIFIED: ICD-10-CM

## 2022-02-25 LAB
BASOPHILS # BLD AUTO: 0.01 K/UL — SIGNIFICANT CHANGE UP (ref 0–0.2)
BASOPHILS NFR BLD AUTO: 0.2 % — SIGNIFICANT CHANGE UP (ref 0–2)
EOSINOPHIL # BLD AUTO: 0.04 K/UL — SIGNIFICANT CHANGE UP (ref 0–0.5)
EOSINOPHIL NFR BLD AUTO: 0.6 % — SIGNIFICANT CHANGE UP (ref 0–6)
HCT VFR BLD CALC: 38.6 % — SIGNIFICANT CHANGE UP (ref 34.5–45)
HGB BLD-MCNC: 12.4 G/DL — SIGNIFICANT CHANGE UP (ref 11.5–15.5)
IMM GRANULOCYTES NFR BLD AUTO: 0.5 % — SIGNIFICANT CHANGE UP (ref 0–1.5)
LYMPHOCYTES # BLD AUTO: 1.5 K/UL — SIGNIFICANT CHANGE UP (ref 1–3.3)
LYMPHOCYTES # BLD AUTO: 23.4 % — SIGNIFICANT CHANGE UP (ref 13–44)
MCHC RBC-ENTMCNC: 26.7 PG — LOW (ref 27–34)
MCHC RBC-ENTMCNC: 32.1 G/DL — SIGNIFICANT CHANGE UP (ref 32–36)
MCV RBC AUTO: 83 FL — SIGNIFICANT CHANGE UP (ref 80–100)
MONOCYTES # BLD AUTO: 0.61 K/UL — SIGNIFICANT CHANGE UP (ref 0–0.9)
MONOCYTES NFR BLD AUTO: 9.5 % — SIGNIFICANT CHANGE UP (ref 2–14)
NEUTROPHILS # BLD AUTO: 4.22 K/UL — SIGNIFICANT CHANGE UP (ref 1.8–7.4)
NEUTROPHILS NFR BLD AUTO: 65.8 % — SIGNIFICANT CHANGE UP (ref 43–77)
NRBC # BLD: 0 /100 WBCS — SIGNIFICANT CHANGE UP (ref 0–0)
PLATELET # BLD AUTO: 213 K/UL — SIGNIFICANT CHANGE UP (ref 150–400)
RBC # BLD: 4.65 M/UL — SIGNIFICANT CHANGE UP (ref 3.8–5.2)
RBC # FLD: 13.4 % — SIGNIFICANT CHANGE UP (ref 10.3–14.5)
WBC # BLD: 6.41 K/UL — SIGNIFICANT CHANGE UP (ref 3.8–10.5)
WBC # FLD AUTO: 6.41 K/UL — SIGNIFICANT CHANGE UP (ref 3.8–10.5)

## 2022-02-25 PROCEDURE — 99215 OFFICE O/P EST HI 40 MIN: CPT

## 2022-02-26 NOTE — REVIEW OF SYSTEMS
[Shortness Of Breath] : shortness of breath [Wheezing] : no wheezing [Cough] : no cough [SOB on Exertion] : no shortness of breath during exertion [Skin Rash] : no skin rash [Skin Wound] : no skin wound [Confused] : no confusion [Dizziness] : no dizziness [Fainting] : no fainting [Difficulty Walking] : no difficulty walking [Negative] : Allergic/Immunologic [de-identified] : Pain over the chest wall

## 2022-02-26 NOTE — CONSULT LETTER
[Dear  ___] : Dear  [unfilled], [Courtesy Letter:] : I had the pleasure of seeing your patient, [unfilled], in my office today. [Please see my note below.] : Please see my note below. [Consult Closing:] : Thank you very much for allowing me to participate in the care of this patient.  If you have any questions, please do not hesitate to contact me. [Sincerely,] : Sincerely, [FreeTextEntry2] : Heidy Hall MD\par [address] [facility] [address]\[place] [address]  [FreeTextEntry3] : Richard Lucia MD\par Attending\par [facility]\par

## 2022-02-26 NOTE — PHYSICAL EXAM
[Fully active, able to carry on all pre-disease performance without restriction] : Status 0 - Fully active, able to carry on all pre-disease performance without restriction [Normal] : affect appropriate [de-identified] : R sided port C/D/I  [de-identified] :  strength 5/5 BUE, gait steady  [de-identified] : B МАРИНА flap reconstruction with hyperpigmentation

## 2022-02-26 NOTE — HISTORY OF PRESENT ILLNESS
[Disease: _____________________] : Disease: [unfilled] [T: ___] : T[unfilled] [N: ___] : N[unfilled] [AJCC Stage: ____] : AJCC Stage: [unfilled] [de-identified] : Age 60: left axilla biopsy metastatic mammary carcinoma \par Screening mammogram done on 12/7/2019 which showed enlarged left axillary lymph node for which ultrasound guided biopsy was recommended and done on 1/21/2020. Pathology showed metastatic mammary carcinoma with lobular and signet ring cell features that is ER > 90%, [place] 0%, and Hmm4qfu negative. MRI of the breast done 2/13/2020 showed nonmass enhancement in the upper inner left breast measuring 5 cm in AP dimension and abnormal left axillary lymph node with biopsy clip. Also 0.2 cm focus enhancing in the right breast. She underwent MRI guided biopsy of the left breast done on 3/11/2020. We reviewed neoadjuvant chemotherapy which she refused and was on anastrozole while awaiting surgery which was delayed due to covid. She underwent left lumpectomy with sentinel lymph node followed by left axillary lymph node dissection with Dr Hall on 5/5/2020. The pathology showed invasive lobular carcinoma, classic type, Grade 2, invasive component was 2.3 cm and 5 out of 7 lymph nodes positive for carcinoma: T2N2 ER 90%, [place] 0%, Jlo0axs negative. We performed CT of the chest/ abd/ pelvis along with bone scan 5/22/2020 which showed indeterminate liver lesion. She had MRI of the abdomen 6/22/2020 done which showed hemangioma. She underwent prophylactic nipple sparing prophylactic right mastectomy and left mastectomy with additional lymph node removal on 6/30/2020 with Dr Hall. The pathology showed fibrocystic changes in the right prophylactic mastectomy and negative sentinel lymph node. The left mastectomy showed LCIS along with 5 additional positive lymph nodes. She had adjuvant dose dense AC from 9/9/2020 to 10/21/2020 followed by weekly paclitaxel from 11/4/2020 to 1/2021. She started RT with Dr Diehl 3/2021.  [de-identified] : mammary carcinoma ER > 90%, [place] 0%, Scy3npb negative  [de-identified] : dose dense AC 9/9/2020 to 10/21/2020 \par paclitaxel 11/4/2020 to 1/20/2021\par anastrozole 6/2021 to present  [de-identified] : She has been having L upper axillary tenderness along with occasional SOB sensation: worse with exertion. No new swelling in the legs. She has also been feeling more sad from her personal life and getting counseling. She would like duloxetine to be increased: she is currently on 20mg daily and numbness sensation is unchanged. Denies any falls or dropping items. She continues to work. Taking anastrozole without any new side effects.

## 2022-02-26 NOTE — ASSESSMENT
[FreeTextEntry1] : She is a 62 y/o AAF Stage III left invasive lobular carcinoma who was on anastrozole while awaiting surgery s/p lumpectomy with axillary lymph node dissection in 5/5/2020 followed by mastectomy 6/30/2020 with additional lymph nodes removed. She completed adjuvant dose dense AC followed by paclitaxel (week 12 held due to worsening neuropathy). She has completed RT with Dr Diehl and on anastrozole since 6/2021. Next follow up in 4 months but earlier if any new symptoms.\par \par Chest wall pain: we reviewed postsurgical pain but given lobular carcinoma, will obtain tumor markers and obtain CT imaging to ensure no new recurrence. \par \par Neuropathy: on duloxetine: will titrate up to 30 mg to see if will help with numbness sensation and mood. Reviewed continued massage and exercise. \par \par Depression: she is seeing therapist; will increase duloxetine; reviewed social work assistance/ Dr Sunny carmona if she wants additional counseling. \par \par SOB: no evidence of heart failure, will obtain interval CT imaging \par \par Bone density ordered for bone health on AI: We reviewed calcium and Vitamin D supplementation along with exercise to maintain bone health.\par

## 2022-02-28 LAB
ALBUMIN SERPL ELPH-MCNC: 4.2 G/DL
ALP BLD-CCNC: 115 U/L
ALT SERPL-CCNC: 14 U/L
ANION GAP SERPL CALC-SCNC: 16 MMOL/L
AST SERPL-CCNC: 17 U/L
BILIRUB SERPL-MCNC: 0.2 MG/DL
BUN SERPL-MCNC: 19 MG/DL
CALCIUM SERPL-MCNC: 9.8 MG/DL
CANCER AG27-29 SERPL-ACNC: 26.1 U/ML
CEA SERPL-MCNC: 3.1 NG/ML
CHLORIDE SERPL-SCNC: 107 MMOL/L
CO2 SERPL-SCNC: 20 MMOL/L
CREAT SERPL-MCNC: 0.79 MG/DL
GLUCOSE SERPL-MCNC: 95 MG/DL
POTASSIUM SERPL-SCNC: 4.5 MMOL/L
PROT SERPL-MCNC: 6.7 G/DL
SODIUM SERPL-SCNC: 144 MMOL/L
TSH SERPL-ACNC: 1.39 UIU/ML
VIT B12 SERPL-MCNC: 523 PG/ML

## 2022-04-07 ENCOUNTER — RESULT REVIEW (OUTPATIENT)
Age: 62
End: 2022-04-07

## 2022-04-22 ENCOUNTER — APPOINTMENT (OUTPATIENT)
Dept: CT IMAGING | Facility: IMAGING CENTER | Age: 62
End: 2022-04-22
Payer: COMMERCIAL

## 2022-04-22 ENCOUNTER — APPOINTMENT (OUTPATIENT)
Dept: RADIOLOGY | Facility: IMAGING CENTER | Age: 62
End: 2022-04-22
Payer: COMMERCIAL

## 2022-04-22 ENCOUNTER — OUTPATIENT (OUTPATIENT)
Dept: OUTPATIENT SERVICES | Facility: HOSPITAL | Age: 62
LOS: 1 days | End: 2022-04-22
Payer: COMMERCIAL

## 2022-04-22 DIAGNOSIS — Z98.890 OTHER SPECIFIED POSTPROCEDURAL STATES: Chronic | ICD-10-CM

## 2022-04-22 DIAGNOSIS — R06.02 SHORTNESS OF BREATH: ICD-10-CM

## 2022-04-22 DIAGNOSIS — Z00.8 ENCOUNTER FOR OTHER GENERAL EXAMINATION: ICD-10-CM

## 2022-04-22 DIAGNOSIS — R07.89 OTHER CHEST PAIN: ICD-10-CM

## 2022-04-22 DIAGNOSIS — Z90.710 ACQUIRED ABSENCE OF BOTH CERVIX AND UTERUS: Chronic | ICD-10-CM

## 2022-04-22 DIAGNOSIS — Z90.13 ACQUIRED ABSENCE OF BILATERAL BREASTS AND NIPPLES: Chronic | ICD-10-CM

## 2022-04-22 DIAGNOSIS — C50.912 MALIGNANT NEOPLASM OF UNSPECIFIED SITE OF LEFT FEMALE BREAST: ICD-10-CM

## 2022-04-22 PROCEDURE — 71260 CT THORAX DX C+: CPT | Mod: 26

## 2022-04-22 PROCEDURE — 74177 CT ABD & PELVIS W/CONTRAST: CPT | Mod: 26

## 2022-04-22 PROCEDURE — 77080 DXA BONE DENSITY AXIAL: CPT | Mod: 26

## 2022-04-22 PROCEDURE — 77080 DXA BONE DENSITY AXIAL: CPT

## 2022-04-22 PROCEDURE — 74177 CT ABD & PELVIS W/CONTRAST: CPT

## 2022-04-22 PROCEDURE — 71260 CT THORAX DX C+: CPT

## 2022-04-25 ENCOUNTER — NON-APPOINTMENT (OUTPATIENT)
Age: 62
End: 2022-04-25

## 2022-04-26 ENCOUNTER — NON-APPOINTMENT (OUTPATIENT)
Age: 62
End: 2022-04-26

## 2022-06-13 ENCOUNTER — OUTPATIENT (OUTPATIENT)
Dept: OUTPATIENT SERVICES | Facility: HOSPITAL | Age: 62
LOS: 1 days | Discharge: HOME | End: 2022-06-13

## 2022-06-13 DIAGNOSIS — Z98.890 OTHER SPECIFIED POSTPROCEDURAL STATES: Chronic | ICD-10-CM

## 2022-06-13 DIAGNOSIS — Z90.710 ACQUIRED ABSENCE OF BOTH CERVIX AND UTERUS: Chronic | ICD-10-CM

## 2022-06-13 DIAGNOSIS — Z90.13 ACQUIRED ABSENCE OF BILATERAL BREASTS AND NIPPLES: Chronic | ICD-10-CM

## 2022-06-13 DIAGNOSIS — Z11.1 ENCOUNTER FOR SCREENING FOR RESPIRATORY TUBERCULOSIS: ICD-10-CM

## 2022-06-20 ENCOUNTER — APPOINTMENT (OUTPATIENT)
Dept: HEMATOLOGY ONCOLOGY | Facility: CLINIC | Age: 62
End: 2022-06-20

## 2022-08-11 ENCOUNTER — OUTPATIENT (OUTPATIENT)
Dept: OUTPATIENT SERVICES | Facility: HOSPITAL | Age: 62
LOS: 1 days | Discharge: HOME | End: 2022-08-11

## 2022-08-11 DIAGNOSIS — Z11.1 ENCOUNTER FOR SCREENING FOR RESPIRATORY TUBERCULOSIS: ICD-10-CM

## 2022-08-11 DIAGNOSIS — Z98.890 OTHER SPECIFIED POSTPROCEDURAL STATES: Chronic | ICD-10-CM

## 2022-08-11 DIAGNOSIS — Z90.710 ACQUIRED ABSENCE OF BOTH CERVIX AND UTERUS: Chronic | ICD-10-CM

## 2022-08-11 DIAGNOSIS — Z90.13 ACQUIRED ABSENCE OF BILATERAL BREASTS AND NIPPLES: Chronic | ICD-10-CM

## 2022-08-15 ENCOUNTER — RESULT REVIEW (OUTPATIENT)
Age: 62
End: 2022-08-15

## 2022-08-15 ENCOUNTER — NON-APPOINTMENT (OUTPATIENT)
Age: 62
End: 2022-08-15

## 2022-08-15 ENCOUNTER — APPOINTMENT (OUTPATIENT)
Dept: HEMATOLOGY ONCOLOGY | Facility: CLINIC | Age: 62
End: 2022-08-15

## 2022-08-15 VITALS
WEIGHT: 176.81 LBS | TEMPERATURE: 97 F | BODY MASS INDEX: 32.54 KG/M2 | HEIGHT: 61.81 IN | OXYGEN SATURATION: 99 % | DIASTOLIC BLOOD PRESSURE: 84 MMHG | HEART RATE: 63 BPM | SYSTOLIC BLOOD PRESSURE: 144 MMHG | RESPIRATION RATE: 16 BRPM

## 2022-08-15 DIAGNOSIS — D72.819 DECREASED WHITE BLOOD CELL COUNT, UNSPECIFIED: ICD-10-CM

## 2022-08-15 LAB
25(OH)D3 SERPL-MCNC: 27.5 NG/ML
ALBUMIN SERPL ELPH-MCNC: 3.9 G/DL
ALP BLD-CCNC: 115 U/L
ALT SERPL-CCNC: 9 U/L
ANION GAP SERPL CALC-SCNC: 12 MMOL/L
AST SERPL-CCNC: 15 U/L
BASOPHILS # BLD AUTO: 0.02 K/UL — SIGNIFICANT CHANGE UP (ref 0–0.2)
BASOPHILS NFR BLD AUTO: 0.3 % — SIGNIFICANT CHANGE UP (ref 0–2)
BILIRUB SERPL-MCNC: 0.2 MG/DL
BUN SERPL-MCNC: 15 MG/DL
CALCIUM SERPL-MCNC: 9.9 MG/DL
CHLORIDE SERPL-SCNC: 105 MMOL/L
CHOLEST SERPL-MCNC: 217 MG/DL
CO2 SERPL-SCNC: 23 MMOL/L
CREAT SERPL-MCNC: 0.72 MG/DL
EGFR: 94 ML/MIN/1.73M2
EOSINOPHIL # BLD AUTO: 0.13 K/UL — SIGNIFICANT CHANGE UP (ref 0–0.5)
EOSINOPHIL NFR BLD AUTO: 2.1 % — SIGNIFICANT CHANGE UP (ref 0–6)
GLUCOSE SERPL-MCNC: 94 MG/DL
HCT VFR BLD CALC: 37.4 % — SIGNIFICANT CHANGE UP (ref 34.5–45)
HDLC SERPL-MCNC: 57 MG/DL
HGB BLD-MCNC: 11.8 G/DL — SIGNIFICANT CHANGE UP (ref 11.5–15.5)
IMM GRANULOCYTES NFR BLD AUTO: 0.2 % — SIGNIFICANT CHANGE UP (ref 0–1.5)
LDLC SERPL CALC-MCNC: 146 MG/DL
LYMPHOCYTES # BLD AUTO: 1.53 K/UL — SIGNIFICANT CHANGE UP (ref 1–3.3)
LYMPHOCYTES # BLD AUTO: 24.6 % — SIGNIFICANT CHANGE UP (ref 13–44)
MCHC RBC-ENTMCNC: 26 PG — LOW (ref 27–34)
MCHC RBC-ENTMCNC: 31.6 G/DL — LOW (ref 32–36)
MCV RBC AUTO: 82.4 FL — SIGNIFICANT CHANGE UP (ref 80–100)
MONOCYTES # BLD AUTO: 0.48 K/UL — SIGNIFICANT CHANGE UP (ref 0–0.9)
MONOCYTES NFR BLD AUTO: 7.7 % — SIGNIFICANT CHANGE UP (ref 2–14)
NEUTROPHILS # BLD AUTO: 4.04 K/UL — SIGNIFICANT CHANGE UP (ref 1.8–7.4)
NEUTROPHILS NFR BLD AUTO: 65.1 % — SIGNIFICANT CHANGE UP (ref 43–77)
NONHDLC SERPL-MCNC: 160 MG/DL
NRBC # BLD: 0 /100 WBCS — SIGNIFICANT CHANGE UP (ref 0–0)
PLATELET # BLD AUTO: 227 K/UL — SIGNIFICANT CHANGE UP (ref 150–400)
POTASSIUM SERPL-SCNC: 4.5 MMOL/L
PROT SERPL-MCNC: 6.6 G/DL
RBC # BLD: 4.54 M/UL — SIGNIFICANT CHANGE UP (ref 3.8–5.2)
RBC # FLD: 13.3 % — SIGNIFICANT CHANGE UP (ref 10.3–14.5)
SODIUM SERPL-SCNC: 140 MMOL/L
TRIGL SERPL-MCNC: 71 MG/DL
WBC # BLD: 6.21 K/UL — SIGNIFICANT CHANGE UP (ref 3.8–10.5)
WBC # FLD AUTO: 6.21 K/UL — SIGNIFICANT CHANGE UP (ref 3.8–10.5)

## 2022-08-15 PROCEDURE — 99214 OFFICE O/P EST MOD 30 MIN: CPT

## 2022-08-15 RX ORDER — MUPIROCIN 20 MG/G
2 OINTMENT TOPICAL
Qty: 22 | Refills: 0 | Status: DISCONTINUED | COMMUNITY
Start: 2022-06-09

## 2022-08-15 RX ORDER — HYDROQUINONE 40 MG/G
4 CREAM TOPICAL
Qty: 28 | Refills: 0 | Status: DISCONTINUED | COMMUNITY
Start: 2022-06-09

## 2022-08-15 RX ORDER — CEFADROXIL 500 MG/1
500 CAPSULE ORAL
Qty: 14 | Refills: 0 | Status: DISCONTINUED | COMMUNITY
Start: 2022-05-04

## 2022-08-15 NOTE — HISTORY OF PRESENT ILLNESS
[Disease: _____________________] : Disease: [unfilled] [T: ___] : T[unfilled] [N: ___] : N[unfilled] [AJCC Stage: ____] : AJCC Stage: [unfilled] [de-identified] : Age 60: left axilla biopsy metastatic mammary carcinoma \par Screening mammogram done on 12/7/2019 which showed enlarged left axillary lymph node for which ultrasound guided biopsy was recommended and done on 1/21/2020. Pathology showed metastatic mammary carcinoma with lobular and signet ring cell features that is ER > 90%, [place] 0%, and Kdt1qlp negative. MRI of the breast done 2/13/2020 showed nonmass enhancement in the upper inner left breast measuring 5 cm in AP dimension and abnormal left axillary lymph node with biopsy clip. Also 0.2 cm focus enhancing in the right breast. She underwent MRI guided biopsy of the left breast done on 3/11/2020. We reviewed neoadjuvant chemotherapy which she refused and was on anastrozole while awaiting surgery which was delayed due to covid. She underwent left lumpectomy with sentinel lymph node followed by left axillary lymph node dissection with Dr Hall on 5/5/2020. The pathology showed invasive lobular carcinoma, classic type, Grade 2, invasive component was 2.3 cm and 5 out of 7 lymph nodes positive for carcinoma: T2N2 ER 90%, [place] 0%, Eet4mvr negative. We performed CT of the chest/ abd/ pelvis along with bone scan 5/22/2020 which showed indeterminate liver lesion. She had MRI of the abdomen 6/22/2020 done which showed hemangioma. She underwent prophylactic nipple sparing prophylactic right mastectomy and left mastectomy with additional lymph node removal on 6/30/2020 with Dr Hall. The pathology showed fibrocystic changes in the right prophylactic mastectomy and negative sentinel lymph node. The left mastectomy showed LCIS along with 5 additional positive lymph nodes. She had adjuvant dose dense AC from 9/9/2020 to 10/21/2020 followed by weekly paclitaxel from 11/4/2020 to 1/2021. She started RT with Dr Diehl 3/2021.  [de-identified] : mammary carcinoma ER > 90%, [place] 0%, Pyf8lay negative  [de-identified] : dose dense AC 9/9/2020 to 10/21/2020 \par paclitaxel 11/4/2020 to 1/20/2021\par anastrozole 6/2021 to present  [de-identified] : Has baseline neuropathy and has been taking duloxetine as needed. Denies any new breast pain or chest wall changes. No back pain, cough or HA. Denies any new health changes or new medications. She has been having 5th trigger finger.

## 2022-08-15 NOTE — REVIEW OF SYSTEMS
[Skin Rash] : no skin rash [Skin Wound] : no skin wound [Confused] : no confusion [Dizziness] : no dizziness [Fainting] : no fainting [Difficulty Walking] : no difficulty walking [Negative] : Allergic/Immunologic [de-identified] : has thickness over chest wall  [de-identified] : has baseline neuropathy over the hands/ feet

## 2022-08-15 NOTE — ASSESSMENT
[FreeTextEntry1] : She is a 63 y/o AAF Stage III left invasive lobular carcinoma who was on anastrozole while awaiting surgery s/p lumpectomy with axillary lymph node dissection in 5/5/2020 followed by mastectomy 6/30/2020 with additional lymph nodes removed. She completed adjuvant dose dense AC followed by paclitaxel (week 12 held due to worsening neuropathy). She has completed RT with Dr Diehl and on anastrozole since 6/2021. We reviewed calcium and Vitamin D supplementation along with exercise to maintain bone health: bone density 4/2022 normal. We reviewed signs and symptoms of breast cancer recurrence. No new signs or symptoms of recurrence. \par Reviewed trigger finger and recommended evaluation with Dr Orourke. Questions answered to her satisfaction. She is agreeable with plan. Next follow up in 6 months but earlier if any new symptoms.\par

## 2022-08-15 NOTE — PHYSICAL EXAM
[Fully active, able to carry on all pre-disease performance without restriction] : Status 0 - Fully active, able to carry on all pre-disease performance without restriction [Normal] : affect appropriate [de-identified] : R sided port C/D/I  [de-identified] : B МАРИНА flap reconstruction with hyperpigmentation  [de-identified] :  strength 5/5 BUE, gait steady

## 2022-08-16 LAB — CANCER AG27-29 SERPL-ACNC: 30.5 U/ML

## 2022-09-29 ENCOUNTER — NON-APPOINTMENT (OUTPATIENT)
Age: 62
End: 2022-09-29

## 2022-09-30 ENCOUNTER — APPOINTMENT (OUTPATIENT)
Dept: HEMATOLOGY ONCOLOGY | Facility: CLINIC | Age: 62
End: 2022-09-30

## 2022-09-30 ENCOUNTER — RESULT REVIEW (OUTPATIENT)
Age: 62
End: 2022-09-30

## 2022-09-30 VITALS
BODY MASS INDEX: 33.27 KG/M2 | RESPIRATION RATE: 16 BRPM | TEMPERATURE: 97 F | HEART RATE: 73 BPM | SYSTOLIC BLOOD PRESSURE: 118 MMHG | HEIGHT: 61.81 IN | OXYGEN SATURATION: 98 % | DIASTOLIC BLOOD PRESSURE: 78 MMHG | WEIGHT: 180.78 LBS

## 2022-09-30 DIAGNOSIS — R14.0 ABDOMINAL DISTENSION (GASEOUS): ICD-10-CM

## 2022-09-30 LAB
BASOPHILS # BLD AUTO: 0.01 K/UL — SIGNIFICANT CHANGE UP (ref 0–0.2)
BASOPHILS NFR BLD AUTO: 0.1 % — SIGNIFICANT CHANGE UP (ref 0–2)
CANCER AG125 SERPL-ACNC: 5 U/ML
CEA SERPL-MCNC: 2.4 NG/ML
EOSINOPHIL # BLD AUTO: 0.08 K/UL — SIGNIFICANT CHANGE UP (ref 0–0.5)
EOSINOPHIL NFR BLD AUTO: 1.1 % — SIGNIFICANT CHANGE UP (ref 0–6)
HCT VFR BLD CALC: 37.3 % — SIGNIFICANT CHANGE UP (ref 34.5–45)
HGB BLD-MCNC: 11.7 G/DL — SIGNIFICANT CHANGE UP (ref 11.5–15.5)
IMM GRANULOCYTES NFR BLD AUTO: 0.3 % — SIGNIFICANT CHANGE UP (ref 0–0.9)
LYMPHOCYTES # BLD AUTO: 1.78 K/UL — SIGNIFICANT CHANGE UP (ref 1–3.3)
LYMPHOCYTES # BLD AUTO: 25.4 % — SIGNIFICANT CHANGE UP (ref 13–44)
MCHC RBC-ENTMCNC: 25.9 PG — LOW (ref 27–34)
MCHC RBC-ENTMCNC: 31.4 G/DL — LOW (ref 32–36)
MCV RBC AUTO: 82.7 FL — SIGNIFICANT CHANGE UP (ref 80–100)
MONOCYTES # BLD AUTO: 0.53 K/UL — SIGNIFICANT CHANGE UP (ref 0–0.9)
MONOCYTES NFR BLD AUTO: 7.6 % — SIGNIFICANT CHANGE UP (ref 2–14)
NEUTROPHILS # BLD AUTO: 4.58 K/UL — SIGNIFICANT CHANGE UP (ref 1.8–7.4)
NEUTROPHILS NFR BLD AUTO: 65.5 % — SIGNIFICANT CHANGE UP (ref 43–77)
NRBC # BLD: 0 /100 WBCS — SIGNIFICANT CHANGE UP (ref 0–0)
PLATELET # BLD AUTO: 236 K/UL — SIGNIFICANT CHANGE UP (ref 150–400)
RBC # BLD: 4.51 M/UL — SIGNIFICANT CHANGE UP (ref 3.8–5.2)
RBC # FLD: 13.8 % — SIGNIFICANT CHANGE UP (ref 10.3–14.5)
WBC # BLD: 7 K/UL — SIGNIFICANT CHANGE UP (ref 3.8–10.5)
WBC # FLD AUTO: 7 K/UL — SIGNIFICANT CHANGE UP (ref 3.8–10.5)

## 2022-09-30 PROCEDURE — 99214 OFFICE O/P EST MOD 30 MIN: CPT

## 2022-09-30 NOTE — PHYSICAL EXAM
[Fully active, able to carry on all pre-disease performance without restriction] : Status 0 - Fully active, able to carry on all pre-disease performance without restriction [Normal] : affect appropriate [de-identified] : R sided port C/D/I  [de-identified] : B МАРИНА flap reconstruction with hyperpigmentation  [de-identified] :  strength 5/5 BUE, gait steady

## 2022-09-30 NOTE — HISTORY OF PRESENT ILLNESS
[Disease: _____________________] : Disease: [unfilled] [T: ___] : T[unfilled] [N: ___] : N[unfilled] [AJCC Stage: ____] : AJCC Stage: [unfilled] [de-identified] : Age 60: left axilla biopsy metastatic mammary carcinoma \par Screening mammogram done on 12/7/2019 which showed enlarged left axillary lymph node for which ultrasound guided biopsy was recommended and done on 1/21/2020. Pathology showed metastatic mammary carcinoma with lobular and signet ring cell features that is ER > 90%, [place] 0%, and Rjs6bbg negative. MRI of the breast done 2/13/2020 showed nonmass enhancement in the upper inner left breast measuring 5 cm in AP dimension and abnormal left axillary lymph node with biopsy clip. Also 0.2 cm focus enhancing in the right breast. She underwent MRI guided biopsy of the left breast done on 3/11/2020. We reviewed neoadjuvant chemotherapy which she refused and was on anastrozole while awaiting surgery which was delayed due to covid. She underwent left lumpectomy with sentinel lymph node followed by left axillary lymph node dissection with Dr Hall on 5/5/2020. The pathology showed invasive lobular carcinoma, classic type, Grade 2, invasive component was 2.3 cm and 5 out of 7 lymph nodes positive for carcinoma: T2N2 ER 90%, [place] 0%, Nwn3sqy negative. We performed CT of the chest/ abd/ pelvis along with bone scan 5/22/2020 which showed indeterminate liver lesion. She had MRI of the abdomen 6/22/2020 done which showed hemangioma. She underwent prophylactic nipple sparing prophylactic right mastectomy and left mastectomy with additional lymph node removal on 6/30/2020 with Dr Hall. The pathology showed fibrocystic changes in the right prophylactic mastectomy and negative sentinel lymph node. The left mastectomy showed LCIS along with 5 additional positive lymph nodes. She had adjuvant dose dense AC from 9/9/2020 to 10/21/2020 followed by weekly paclitaxel from 11/4/2020 to 1/2021. She started RT with Dr Diehl 3/2021.  [de-identified] : mammary carcinoma ER > 90%, [place] 0%, Yrz7pfx negative  [de-identified] : dose dense AC 9/9/2020 to 10/21/2020 \par paclitaxel 11/4/2020 to 1/20/2021\par anastrozole 6/2021 to present  [de-identified] : Has not had GYN exam for years: had hysterectomy done 2015 and was negative. She had surgery done for ASCUS. No vaginal spotting. Had last colonoscopy last year and no new findings. Bowels have been normal: occasional constipation. No blood in stool. Noticed swelling over the pubic area 2 weeks ago: no infection or redness. Feels swollen/ bloated.

## 2022-09-30 NOTE — REASON FOR VISIT
[Follow-Up Visit] : a follow-up [FreeTextEntry2] : follow up for new bloating over the lower pelvis

## 2022-09-30 NOTE — ASSESSMENT
[FreeTextEntry1] : She is a 61 y/o AAF Stage III left invasive lobular carcinoma who was on anastrozole while awaiting surgery s/p lumpectomy with axillary lymph node dissection in 5/5/2020 followed by mastectomy 6/30/2020 with additional lymph nodes removed. She completed adjuvant dose dense AC followed by paclitaxel (week 12 held due to worsening neuropathy). She has completed RT with Dr Diehl and on anastrozole since 6/2021. We reviewed her past hysterectomy/ colonoscopy and new bloating sensation without any change in medications or diet. Will obtain blood work today and will check tumor markers. Will obtain interval CT abd/ pelvis to rule out disease given lobular carcinoma that can affect lining of the pelvis. We reviewed follow up evaluation with GYN given new symptoms and will follow.  \par \par Breast cancer continues with anastrozole\par \par Hand carpal tunnel: will refer to Dr Orourke

## 2022-09-30 NOTE — REVIEW OF SYSTEMS
[Negative] : Allergic/Immunologic [Abdominal Pain] : no abdominal pain [Vomiting] : no vomiting [Constipation] : no constipation [Diarrhea] : no diarrhea [FreeTextEntry7] : bloating sensation and swelling over the pubic area

## 2022-10-04 LAB — CANCER AG27-29 SERPL-ACNC: 22.4 U/ML

## 2022-10-05 ENCOUNTER — APPOINTMENT (OUTPATIENT)
Dept: OBGYN | Facility: CLINIC | Age: 62
End: 2022-10-05

## 2022-10-05 VITALS
WEIGHT: 178.5 LBS | HEIGHT: 61.81 IN | SYSTOLIC BLOOD PRESSURE: 115 MMHG | BODY MASS INDEX: 32.85 KG/M2 | DIASTOLIC BLOOD PRESSURE: 77 MMHG

## 2022-10-05 PROCEDURE — 99386 PREV VISIT NEW AGE 40-64: CPT

## 2022-10-06 LAB — HPV HIGH+LOW RISK DNA PNL CVX: NOT DETECTED

## 2022-10-10 LAB — CYTOLOGY CVX/VAG DOC THIN PREP: ABNORMAL

## 2022-10-20 ENCOUNTER — NON-APPOINTMENT (OUTPATIENT)
Age: 62
End: 2022-10-20

## 2022-10-23 NOTE — H&P PST ADULT - PRO INTERPRETER NEED 2
Lab Results   Component Value Date    HGBA1C 10 2 (H) 07/01/2022       Recent Labs     10/22/22  1703 10/22/22  2110 10/23/22  0754 10/23/22  1114   POCGLU 386* 128 264* 471*       Blood Sugar Average: Last 72 hrs:  (P) 720 [id number]   · Target blood sugar for the hospital 140-180   · Continue Levemir increased dosing to 8u in AM, adding 3u in PM   · Implement Accu-Cheks AC and HS with sliding scale coverage  · Diabetic neuropathy-continue gabapentin English

## 2022-10-26 ENCOUNTER — RESULT REVIEW (OUTPATIENT)
Age: 62
End: 2022-10-26

## 2022-10-29 ENCOUNTER — APPOINTMENT (OUTPATIENT)
Dept: CT IMAGING | Facility: IMAGING CENTER | Age: 62
End: 2022-10-29

## 2022-10-29 ENCOUNTER — OUTPATIENT (OUTPATIENT)
Dept: OUTPATIENT SERVICES | Facility: HOSPITAL | Age: 62
LOS: 1 days | End: 2022-10-29
Payer: COMMERCIAL

## 2022-10-29 DIAGNOSIS — Z90.710 ACQUIRED ABSENCE OF BOTH CERVIX AND UTERUS: Chronic | ICD-10-CM

## 2022-10-29 DIAGNOSIS — Z98.890 OTHER SPECIFIED POSTPROCEDURAL STATES: Chronic | ICD-10-CM

## 2022-10-29 DIAGNOSIS — Z00.8 ENCOUNTER FOR OTHER GENERAL EXAMINATION: ICD-10-CM

## 2022-10-29 DIAGNOSIS — R14.0 ABDOMINAL DISTENSION (GASEOUS): ICD-10-CM

## 2022-10-29 DIAGNOSIS — Z90.13 ACQUIRED ABSENCE OF BILATERAL BREASTS AND NIPPLES: Chronic | ICD-10-CM

## 2022-10-29 PROCEDURE — 74177 CT ABD & PELVIS W/CONTRAST: CPT | Mod: 26

## 2022-10-29 PROCEDURE — 74177 CT ABD & PELVIS W/CONTRAST: CPT

## 2022-11-01 ENCOUNTER — APPOINTMENT (OUTPATIENT)
Dept: ORTHOPEDIC SURGERY | Facility: CLINIC | Age: 62
End: 2022-11-01

## 2022-11-01 ENCOUNTER — NON-APPOINTMENT (OUTPATIENT)
Age: 62
End: 2022-11-01

## 2022-11-01 VITALS
WEIGHT: 175 LBS | SYSTOLIC BLOOD PRESSURE: 137 MMHG | DIASTOLIC BLOOD PRESSURE: 83 MMHG | HEART RATE: 71 BPM | OXYGEN SATURATION: 98 % | BODY MASS INDEX: 33.04 KG/M2 | HEIGHT: 61 IN

## 2022-11-01 DIAGNOSIS — M65.352 TRIGGER FINGER, LEFT LITTLE FINGER: ICD-10-CM

## 2022-11-01 DIAGNOSIS — M65.351 TRIGGER FINGER, RIGHT LITTLE FINGER: ICD-10-CM

## 2022-11-01 PROCEDURE — 20550 NJX 1 TENDON SHEATH/LIGAMENT: CPT | Mod: F4,F9

## 2022-11-01 PROCEDURE — 99204 OFFICE O/P NEW MOD 45 MIN: CPT | Mod: 25

## 2022-12-12 ENCOUNTER — APPOINTMENT (OUTPATIENT)
Dept: ORTHOPEDIC SURGERY | Facility: CLINIC | Age: 62
End: 2022-12-12

## 2023-02-05 ENCOUNTER — OUTPATIENT (OUTPATIENT)
Dept: OUTPATIENT SERVICES | Facility: HOSPITAL | Age: 63
LOS: 1 days | Discharge: HOME | End: 2023-02-05

## 2023-02-05 DIAGNOSIS — Z90.710 ACQUIRED ABSENCE OF BOTH CERVIX AND UTERUS: Chronic | ICD-10-CM

## 2023-02-05 DIAGNOSIS — Z98.890 OTHER SPECIFIED POSTPROCEDURAL STATES: Chronic | ICD-10-CM

## 2023-02-05 DIAGNOSIS — Z11.1 ENCOUNTER FOR SCREENING FOR RESPIRATORY TUBERCULOSIS: ICD-10-CM

## 2023-02-05 DIAGNOSIS — Z90.13 ACQUIRED ABSENCE OF BILATERAL BREASTS AND NIPPLES: Chronic | ICD-10-CM

## 2023-02-13 ENCOUNTER — APPOINTMENT (OUTPATIENT)
Dept: HEMATOLOGY ONCOLOGY | Facility: CLINIC | Age: 63
End: 2023-02-13
Payer: COMMERCIAL

## 2023-02-13 VITALS
BODY MASS INDEX: 34.21 KG/M2 | WEIGHT: 181.22 LBS | OXYGEN SATURATION: 99 % | RESPIRATION RATE: 16 BRPM | HEART RATE: 67 BPM | TEMPERATURE: 97.2 F | HEIGHT: 60.98 IN | DIASTOLIC BLOOD PRESSURE: 86 MMHG | SYSTOLIC BLOOD PRESSURE: 145 MMHG

## 2023-02-13 DIAGNOSIS — I89.0 LYMPHEDEMA, NOT ELSEWHERE CLASSIFIED: ICD-10-CM

## 2023-02-13 DIAGNOSIS — Z79.811 LONG TERM (CURRENT) USE OF AROMATASE INHIBITORS: ICD-10-CM

## 2023-02-13 PROCEDURE — 99214 OFFICE O/P EST MOD 30 MIN: CPT

## 2023-02-13 RX ORDER — TRIAMCINOLONE ACETONIDE 1 MG/G
0.1 OINTMENT TOPICAL TWICE DAILY
Qty: 30 | Refills: 2 | Status: DISCONTINUED | COMMUNITY
Start: 2021-10-25 | End: 2023-02-13

## 2023-02-13 RX ORDER — SAW/PYGEUM/BETA/HERB/D3/B6/ZN 30 MG-25MG
200 CAPSULE ORAL
Qty: 30 | Refills: 3 | Status: DISCONTINUED | COMMUNITY
Start: 2021-03-11 | End: 2023-02-13

## 2023-02-13 RX ORDER — DULOXETINE HYDROCHLORIDE 30 MG/1
30 CAPSULE, DELAYED RELEASE PELLETS ORAL
Qty: 30 | Refills: 2 | Status: DISCONTINUED | COMMUNITY
Start: 2021-07-26 | End: 2023-02-13

## 2023-02-13 NOTE — CONSULT LETTER
[Dear  ___] : Dear  [unfilled], [Courtesy Letter:] : I had the pleasure of seeing your patient, [unfilled], in my office today. [Please see my note below.] : Please see my note below. [Consult Closing:] : Thank you very much for allowing me to participate in the care of this patient.  If you have any questions, please do not hesitate to contact me. [Sincerely,] : Sincerely, [FreeTextEntry2] : Heidy Hall MD\par [address] \[place] [address]\par  [FreeTextEntry3] : Richard Lucia MD\par Attending\par [facility]\par  [DrCoral  ___] : Dr. WALLACE

## 2023-02-13 NOTE — HISTORY OF PRESENT ILLNESS
[Disease: _____________________] : Disease: [unfilled] [T: ___] : T[unfilled] [N: ___] : N[unfilled] [AJCC Stage: ____] : AJCC Stage: [unfilled] [de-identified] : Age 60: left axilla biopsy metastatic mammary carcinoma \par Screening mammogram done on 12/7/2019 which showed enlarged left axillary lymph node for which ultrasound guided biopsy was recommended and done on 1/21/2020. Pathology showed metastatic mammary carcinoma with lobular and signet ring cell features that is ER > 90%, [place] 0%, and Utw3fap negative. MRI of the breast done 2/13/2020 showed nonmass enhancement in the upper inner left breast measuring 5 cm in AP dimension and abnormal left axillary lymph node with biopsy clip. Also 0.2 cm focus enhancing in the right breast. She underwent MRI guided biopsy of the left breast done on 3/11/2020. We reviewed neoadjuvant chemotherapy which she refused and was on anastrozole while awaiting surgery which was delayed due to covid. She underwent left lumpectomy with sentinel lymph node followed by left axillary lymph node dissection with Dr Hall on 5/5/2020. The pathology showed invasive lobular carcinoma, classic type, Grade 2, invasive component was 2.3 cm and 5 out of 7 lymph nodes positive for carcinoma: T2N2 ER 90%, [place] 0%, Wac9ppv negative. We performed CT of the chest/ abd/ pelvis along with bone scan 5/22/2020 which showed indeterminate liver lesion. She had MRI of the abdomen 6/22/2020 done which showed hemangioma. She underwent prophylactic nipple sparing prophylactic right mastectomy and left mastectomy with additional lymph node removal on 6/30/2020 with Dr Hall. The pathology showed fibrocystic changes in the right prophylactic mastectomy and negative sentinel lymph node. The left mastectomy showed LCIS along with 5 additional positive lymph nodes. She had adjuvant dose dense AC from 9/9/2020 to 10/21/2020 followed by weekly paclitaxel from 11/4/2020 to 1/2021. She started RT with Dr Diehl 3/2021.  [de-identified] : mammary carcinoma ER > 90%, [place] 0%, Xty9zfi negative  [de-identified] : dose dense AC 9/9/2020 to 10/21/2020 \par paclitaxel 11/4/2020 to 1/20/2021\par anastrozole 6/2021 to present  [de-identified] : Since last evaluation, she had evaluation with hand surgeon: Dr Orourke with steroid injection to the carpal tunnel. She has baseline neuropathy over the hands and feet: describes as numbness. Admits she is not taking the duloxetine. She feels the numbness is chronic whether or not she takes the duloxetine. Will be seeing Dr Luis for revision of МАРИНА flap of the abdomen due to tightness sensation: this 2/16/2023. Had presurgical testing done on 1/31/2023. Denies any new breast pain or chest wall changes. No back pain, cough or HA. No falls or unsteadiness. Has pain sensation over the feet due to anastrozole and helps to have the handicap sticker to park closer to work and transportation.

## 2023-02-13 NOTE — REVIEW OF SYSTEMS
[Joint Pain] : joint pain [Negative] : Allergic/Immunologic [Abdominal Pain] : no abdominal pain [Vomiting] : no vomiting [Constipation] : no constipation [Diarrhea] : no diarrhea [Joint Stiffness] : no joint stiffness [Muscle Pain] : no muscle pain [Muscle Weakness] : no muscle weakness [Skin Rash] : no skin rash [Skin Wound] : no skin wound [Confused] : no confusion [Dizziness] : no dizziness [Fainting] : no fainting [Difficulty Walking] : no difficulty walking [FreeTextEntry7] : tightness sensation over the abdominal wall  [de-identified] : LUE lymphedema  [de-identified] : numbness of the fingers and feet

## 2023-02-13 NOTE — PHYSICAL EXAM
[Restricted in physically strenuous activity but ambulatory and able to carry out work of a light or sedentary nature] : Status 1- Restricted in physically strenuous activity but ambulatory and able to carry out work of a light or sedentary nature, e.g., light house work, office work [Normal] : affect appropriate [de-identified] : B МАРИНА flap reconstruction with hyperpigmentation  [de-identified] :  strength 5/5 BUE, gait steady

## 2023-02-13 NOTE — ASSESSMENT
[FreeTextEntry1] : She is a 64 y/o AAF Stage III left invasive lobular carcinoma who was on anastrozole while awaiting surgery s/p lumpectomy with axillary lymph node dissection in 5/5/2020 followed by mastectomy 6/30/2020 with additional lymph nodes removed. She completed adjuvant dose dense AC followed by paclitaxel (week 12 held due to worsening neuropathy). She has completed RT with Dr Diehl and on anastrozole since 6/2021. We reviewed her last CT abd/ pelvis which did not show any suspicious findings. She will have revision surgery with Dr Luis. Will get copy of presurgical testing. We reviewed signs and symptoms of breast cancer recurrence. No new signs or symptoms of recurrence. Next follow up in 4 months but earlier if any new symptoms.\par \par Neuropathy: will d/c duloxetine: pt has been off; will encourage supportive measures with massage of the hands and feet along with using squeeze ball: completed PT for this. \par \par Lymphedema: continue with LUE sleeve.\par \par Arthralgias: continue with supportive measures. \par \par Handicap sticker form completed and returned to pt. \par \par on anastrozole: We reviewed calcium and Vitamin D supplementation along with exercise to maintain bone health. Bone density WNL on 2022.

## 2023-06-19 ENCOUNTER — OUTPATIENT (OUTPATIENT)
Dept: OUTPATIENT SERVICES | Facility: HOSPITAL | Age: 63
LOS: 1 days | Discharge: HOME | End: 2023-06-19

## 2023-06-19 DIAGNOSIS — Z90.13 ACQUIRED ABSENCE OF BILATERAL BREASTS AND NIPPLES: Chronic | ICD-10-CM

## 2023-06-19 DIAGNOSIS — Z98.890 OTHER SPECIFIED POSTPROCEDURAL STATES: Chronic | ICD-10-CM

## 2023-06-19 DIAGNOSIS — Z11.1 ENCOUNTER FOR SCREENING FOR RESPIRATORY TUBERCULOSIS: ICD-10-CM

## 2023-06-19 DIAGNOSIS — Z90.710 ACQUIRED ABSENCE OF BOTH CERVIX AND UTERUS: Chronic | ICD-10-CM

## 2023-06-26 ENCOUNTER — NON-APPOINTMENT (OUTPATIENT)
Age: 63
End: 2023-06-26

## 2023-08-14 ENCOUNTER — RESULT REVIEW (OUTPATIENT)
Age: 63
End: 2023-08-14

## 2023-08-14 ENCOUNTER — APPOINTMENT (OUTPATIENT)
Dept: HEMATOLOGY ONCOLOGY | Facility: CLINIC | Age: 63
End: 2023-08-14
Payer: COMMERCIAL

## 2023-08-14 VITALS
OXYGEN SATURATION: 99 % | WEIGHT: 181.88 LBS | TEMPERATURE: 97.3 F | RESPIRATION RATE: 16 BRPM | BODY MASS INDEX: 34.38 KG/M2 | HEART RATE: 75 BPM | SYSTOLIC BLOOD PRESSURE: 126 MMHG | DIASTOLIC BLOOD PRESSURE: 82 MMHG

## 2023-08-14 DIAGNOSIS — L30.9 DERMATITIS, UNSPECIFIED: ICD-10-CM

## 2023-08-14 DIAGNOSIS — C50.912 MALIGNANT NEOPLASM OF UNSPECIFIED SITE OF LEFT FEMALE BREAST: ICD-10-CM

## 2023-08-14 LAB
BASOPHILS # BLD AUTO: 0.02 K/UL — SIGNIFICANT CHANGE UP (ref 0–0.2)
BASOPHILS NFR BLD AUTO: 0.3 % — SIGNIFICANT CHANGE UP (ref 0–2)
EOSINOPHIL # BLD AUTO: 0.18 K/UL — SIGNIFICANT CHANGE UP (ref 0–0.5)
EOSINOPHIL NFR BLD AUTO: 2.6 % — SIGNIFICANT CHANGE UP (ref 0–6)
HCT VFR BLD CALC: 38.4 % — SIGNIFICANT CHANGE UP (ref 34.5–45)
HGB BLD-MCNC: 11.7 G/DL — SIGNIFICANT CHANGE UP (ref 11.5–15.5)
IMM GRANULOCYTES NFR BLD AUTO: 0.4 % — SIGNIFICANT CHANGE UP (ref 0–0.9)
LYMPHOCYTES # BLD AUTO: 2.38 K/UL — SIGNIFICANT CHANGE UP (ref 1–3.3)
LYMPHOCYTES # BLD AUTO: 34.8 % — SIGNIFICANT CHANGE UP (ref 13–44)
MCHC RBC-ENTMCNC: 25.1 PG — LOW (ref 27–34)
MCHC RBC-ENTMCNC: 30.5 G/DL — LOW (ref 32–36)
MCV RBC AUTO: 82.2 FL — SIGNIFICANT CHANGE UP (ref 80–100)
MONOCYTES # BLD AUTO: 0.52 K/UL — SIGNIFICANT CHANGE UP (ref 0–0.9)
MONOCYTES NFR BLD AUTO: 7.6 % — SIGNIFICANT CHANGE UP (ref 2–14)
NEUTROPHILS # BLD AUTO: 3.71 K/UL — SIGNIFICANT CHANGE UP (ref 1.8–7.4)
NEUTROPHILS NFR BLD AUTO: 54.3 % — SIGNIFICANT CHANGE UP (ref 43–77)
NRBC # BLD: 0 /100 WBCS — SIGNIFICANT CHANGE UP (ref 0–0)
PLATELET # BLD AUTO: 237 K/UL — SIGNIFICANT CHANGE UP (ref 150–400)
RBC # BLD: 4.67 M/UL — SIGNIFICANT CHANGE UP (ref 3.8–5.2)
RBC # FLD: 14.3 % — SIGNIFICANT CHANGE UP (ref 10.3–14.5)
WBC # BLD: 6.84 K/UL — SIGNIFICANT CHANGE UP (ref 3.8–10.5)
WBC # FLD AUTO: 6.84 K/UL — SIGNIFICANT CHANGE UP (ref 3.8–10.5)

## 2023-08-14 PROCEDURE — 99215 OFFICE O/P EST HI 40 MIN: CPT

## 2023-08-15 PROBLEM — L30.9 ECZEMATOUS DERMATITIS: Status: ACTIVE | Noted: 2022-08-15

## 2023-08-15 PROBLEM — C50.912 BREAST CANCER, LEFT BREAST: Status: ACTIVE | Noted: 2020-08-06

## 2023-08-15 LAB
ALBUMIN SERPL ELPH-MCNC: 4.2 G/DL
ALP BLD-CCNC: 113 U/L
ALT SERPL-CCNC: 13 U/L
ANION GAP SERPL CALC-SCNC: 12 MMOL/L
AST SERPL-CCNC: 17 U/L
BILIRUB SERPL-MCNC: 0.3 MG/DL
BUN SERPL-MCNC: 19 MG/DL
CALCIUM SERPL-MCNC: 9.7 MG/DL
CANCER AG27-29 SERPL-ACNC: 24.7 U/ML
CEA SERPL-MCNC: 2.6 NG/ML
CHLORIDE SERPL-SCNC: 106 MMOL/L
CO2 SERPL-SCNC: 24 MMOL/L
CREAT SERPL-MCNC: 0.72 MG/DL
EGFR: 94 ML/MIN/1.73M2
GLUCOSE SERPL-MCNC: 82 MG/DL
POTASSIUM SERPL-SCNC: 4.4 MMOL/L
PROT SERPL-MCNC: 6.8 G/DL
SODIUM SERPL-SCNC: 142 MMOL/L

## 2023-08-15 NOTE — PHYSICAL EXAM
[Fully active, able to carry on all pre-disease performance without restriction] : Status 0 - Fully active, able to carry on all pre-disease performance without restriction [Normal] : affect appropriate [de-identified] : B МАРИНА flap reconstruction with hyperpigmentation  [de-identified] : wears LUE lymphedema sleeve [de-identified] :  strength 5/5 BUE, gait steady

## 2023-08-15 NOTE — HISTORY OF PRESENT ILLNESS
[Disease: _____________________] : Disease: [unfilled] [T: ___] : T[unfilled] [N: ___] : N[unfilled] [AJCC Stage: ____] : AJCC Stage: [unfilled] [de-identified] : Age 60: left axilla biopsy metastatic mammary carcinoma  Screening mammogram done on 12/7/2019 which showed enlarged left axillary lymph node for which ultrasound guided biopsy was recommended and done on 1/21/2020. Pathology showed metastatic mammary carcinoma with lobular and signet ring cell features that is ER > 90%, [place] 0%, and Akd7zae negative. MRI of the breast done 2/13/2020 showed nonmass enhancement in the upper inner left breast measuring 5 cm in AP dimension and abnormal left axillary lymph node with biopsy clip. Also 0.2 cm focus enhancing in the right breast. She underwent MRI guided biopsy of the left breast done on 3/11/2020. We reviewed neoadjuvant chemotherapy which she refused and was on anastrozole while awaiting surgery which was delayed due to covid. She underwent left lumpectomy with sentinel lymph node followed by left axillary lymph node dissection with Dr Hall on 5/5/2020. The pathology showed invasive lobular carcinoma, classic type, Grade 2, invasive component was 2.3 cm and 5 out of 7 lymph nodes positive for carcinoma: T2N2 ER 90%, [place] 0%, Mqh2iev negative. We performed CT of the chest/ abd/ pelvis along with bone scan 5/22/2020 which showed indeterminate liver lesion. She had MRI of the abdomen 6/22/2020 done which showed hemangioma. She underwent prophylactic nipple sparing prophylactic right mastectomy and left mastectomy with additional lymph node removal on 6/30/2020 with Dr Hall. The pathology showed fibrocystic changes in the right prophylactic mastectomy and negative sentinel lymph node. The left mastectomy showed LCIS along with 5 additional positive lymph nodes. She had adjuvant dose dense AC from 9/9/2020 to 10/21/2020 followed by weekly paclitaxel from 11/4/2020 to 1/2021. She started RT with Dr Diehl 3/2021.  [de-identified] : mammary carcinoma ER > 90%, [place] 0%, Jxz1kzx negative  [de-identified] : dose dense AC 9/9/2020 to 10/21/2020 \par  paclitaxel 11/4/2020 to 1/20/2021\par  anastrozole 6/2021 to present  [de-identified] : Has stiffness over the hands: does not affect activities. She denies any new back pain. Has pulling sensation occasionally over the chest wall and abdomen. Denies any new breast pain or chest wall changes. No back pain, cough or HA. She denies any new medications. She does not have PCP but will get one. She has questions of the LN positivity and news she is hearing about Verzenio. Taking B12 for neuropathy and wondering if she could take B complex: has numbness since chemotherapy but prefers supplement. She also is wondering if can have refill on steroid cream for as needed use over eczema.

## 2023-08-15 NOTE — ASSESSMENT
[FreeTextEntry1] : She is a 64 y/o AAF Stage III left invasive lobular carcinoma who was on anastrozole while awaiting surgery s/p lumpectomy with axillary lymph node dissection in 5/5/2020 followed by mastectomy 6/30/2020 with additional lymph nodes removed. She completed adjuvant dose dense AC followed by paclitaxel (week 12 held due to worsening neuropathy). She has completed RT with Dr Diehl and on anastrozole since 6/2021. Continues to tolerate anastrozole: reviewed MonarchE study with FDA approval were after she was already on therapy for more than 5 months (outside of recommended time frame of use). We reviewed low fat diet and exercise daily to help improve breast cancer survival as per WINS study. Reviewed how we would continue to monitor her for symptoms: last CT done 11/2022. Will obtain tumor markers today. Questions answered to her satisfaction. She is agreeable with plan. Next follow up in 6 months but earlier if any new symptoms.  Lymphedema: continue with LUE sleeve.  Arthralgias: continue with supportive measures.   Bone density: normal 4/2022. Continue with exercise and calcium in diet. Will repeat 4/2024.   Eczema: renewed triamcinolone: sparing use to eczema as needed.

## 2023-08-15 NOTE — REASON FOR VISIT
[Follow-Up Visit] : a follow-up [FreeTextEntry2] : follow up for breast cancer on endocrine therapy: anastrozole

## 2023-08-15 NOTE — REVIEW OF SYSTEMS
[Diarrhea: Grade 0] : Diarrhea: Grade 0 [Joint Pain] : joint pain [Joint Stiffness] : joint stiffness [Negative] : Allergic/Immunologic [Muscle Pain] : no muscle pain [Muscle Weakness] : no muscle weakness [Skin Rash] : no skin rash [Skin Wound] : no skin wound [FreeTextEntry9] : over the hands  [de-identified] : tightness over the МАРИНА flap and reconstruction site

## 2023-10-31 ENCOUNTER — NON-APPOINTMENT (OUTPATIENT)
Age: 63
End: 2023-10-31

## 2023-10-31 ENCOUNTER — APPOINTMENT (OUTPATIENT)
Dept: FAMILY MEDICINE | Facility: CLINIC | Age: 63
End: 2023-10-31
Payer: COMMERCIAL

## 2023-10-31 VITALS
SYSTOLIC BLOOD PRESSURE: 135 MMHG | WEIGHT: 180 LBS | DIASTOLIC BLOOD PRESSURE: 75 MMHG | HEART RATE: 72 BPM | RESPIRATION RATE: 16 BRPM | OXYGEN SATURATION: 98 % | BODY MASS INDEX: 33.13 KG/M2 | HEIGHT: 62 IN

## 2023-10-31 VITALS — DIASTOLIC BLOOD PRESSURE: 72 MMHG | SYSTOLIC BLOOD PRESSURE: 124 MMHG

## 2023-10-31 DIAGNOSIS — E66.9 OBESITY, UNSPECIFIED: ICD-10-CM

## 2023-10-31 DIAGNOSIS — M54.30 SCIATICA, UNSPECIFIED SIDE: ICD-10-CM

## 2023-10-31 DIAGNOSIS — Z00.00 ENCOUNTER FOR GENERAL ADULT MEDICAL EXAMINATION W/OUT ABNORMAL FINDINGS: ICD-10-CM

## 2023-10-31 PROCEDURE — 99386 PREV VISIT NEW AGE 40-64: CPT | Mod: 25

## 2023-10-31 RX ORDER — TRIAMCINOLONE ACETONIDE 1 MG/G
0.1 OINTMENT TOPICAL TWICE DAILY
Qty: 1 | Refills: 1 | Status: DISCONTINUED | COMMUNITY
Start: 2022-08-15 | End: 2023-10-31

## 2023-10-31 RX ORDER — MELOXICAM 7.5 MG/1
7.5 TABLET ORAL DAILY
Qty: 15 | Refills: 0 | Status: ACTIVE | COMMUNITY
Start: 2023-10-31 | End: 1900-01-01

## 2023-11-01 LAB
25(OH)D3 SERPL-MCNC: 19.2 NG/ML
ALBUMIN SERPL ELPH-MCNC: 4 G/DL
ALP BLD-CCNC: 119 U/L
ALT SERPL-CCNC: 12 U/L
ANION GAP SERPL CALC-SCNC: 13 MMOL/L
AST SERPL-CCNC: 17 U/L
BASOPHILS # BLD AUTO: 0.02 K/UL
BASOPHILS NFR BLD AUTO: 0.3 %
BILIRUB SERPL-MCNC: 0.2 MG/DL
BUN SERPL-MCNC: 15 MG/DL
CALCIUM SERPL-MCNC: 9.5 MG/DL
CHLORIDE SERPL-SCNC: 108 MMOL/L
CHOLEST SERPL-MCNC: 211 MG/DL
CO2 SERPL-SCNC: 22 MMOL/L
CREAT SERPL-MCNC: 0.71 MG/DL
EGFR: 95 ML/MIN/1.73M2
EOSINOPHIL # BLD AUTO: 0.1 K/UL
EOSINOPHIL NFR BLD AUTO: 1.6 %
ESTIMATED AVERAGE GLUCOSE: 128 MG/DL
GLUCOSE SERPL-MCNC: 101 MG/DL
HBA1C MFR BLD HPLC: 6.1 %
HCT VFR BLD CALC: 37.5 %
HDLC SERPL-MCNC: 50 MG/DL
HGB BLD-MCNC: 11.8 G/DL
IMM GRANULOCYTES NFR BLD AUTO: 0.5 %
LDLC SERPL CALC-MCNC: 151 MG/DL
LYMPHOCYTES # BLD AUTO: 1.99 K/UL
LYMPHOCYTES NFR BLD AUTO: 31.8 %
MAN DIFF?: NORMAL
MCHC RBC-ENTMCNC: 25.2 PG
MCHC RBC-ENTMCNC: 31.5 GM/DL
MCV RBC AUTO: 80.1 FL
MONOCYTES # BLD AUTO: 0.37 K/UL
MONOCYTES NFR BLD AUTO: 5.9 %
NEUTROPHILS # BLD AUTO: 3.74 K/UL
NEUTROPHILS NFR BLD AUTO: 59.9 %
NONHDLC SERPL-MCNC: 161 MG/DL
PLATELET # BLD AUTO: 247 K/UL
POTASSIUM SERPL-SCNC: 4.5 MMOL/L
PROT SERPL-MCNC: 6.9 G/DL
RBC # BLD: 4.68 M/UL
RBC # FLD: 13.7 %
SODIUM SERPL-SCNC: 143 MMOL/L
TRIGL SERPL-MCNC: 56 MG/DL
TSH SERPL-ACNC: 1.95 UIU/ML
WBC # FLD AUTO: 6.25 K/UL

## 2023-11-01 NOTE — H&P PST ADULT - GASTROINTESTINAL
Alert and oriented, no focal deficits, no motor or sensory deficits. negative Alert and oriented to person, place and time Soft, non-tender, no hepatosplenomegaly, normal bowel sounds

## 2024-01-15 NOTE — ASU PREOP CHECKLIST - HEIGHT IN CM
What Type Of Note Output Would You Prefer (Optional)?: Bullet Format How Severe Is Your Skin Lesion?: mild Has Your Skin Lesion Been Treated?: not been treated Is This A New Presentation, Or A Follow-Up?: Skin Lesion 157.48 Additional History: Patient states lesion was previously raised and was told by old dermatologist that it was an SCCKA but failed to follow through on obtaining approval for biopsy and treatment.

## 2024-01-16 NOTE — H&P PST ADULT - ENDOCRINE
PCP and Cardio Dr. Segura [phone] seen last week 1/9/2024 PCP and Cardio Dr. Segura [phone] seen last week 1/9/2024 negative

## 2024-02-16 ENCOUNTER — OUTPATIENT (OUTPATIENT)
Dept: OUTPATIENT SERVICES | Facility: HOSPITAL | Age: 64
LOS: 1 days | Discharge: HOME | End: 2024-02-16

## 2024-02-16 DIAGNOSIS — Z98.890 OTHER SPECIFIED POSTPROCEDURAL STATES: Chronic | ICD-10-CM

## 2024-02-16 DIAGNOSIS — C50.912 MALIGNANT NEOPLASM OF UNSPECIFIED SITE OF LEFT FEMALE BREAST: ICD-10-CM

## 2024-02-16 DIAGNOSIS — Z90.710 ACQUIRED ABSENCE OF BOTH CERVIX AND UTERUS: Chronic | ICD-10-CM

## 2024-02-16 DIAGNOSIS — Z90.13 ACQUIRED ABSENCE OF BILATERAL BREASTS AND NIPPLES: Chronic | ICD-10-CM

## 2024-02-22 RX ORDER — ANASTROZOLE TABLETS 1 MG/1
1 TABLET ORAL
Qty: 90 | Refills: 1 | Status: ACTIVE | COMMUNITY
Start: 2020-04-07 | End: 1900-01-01

## 2024-03-01 ENCOUNTER — APPOINTMENT (OUTPATIENT)
Dept: RADIOLOGY | Facility: CLINIC | Age: 64
End: 2024-03-01
Payer: COMMERCIAL

## 2024-03-01 ENCOUNTER — RESULT REVIEW (OUTPATIENT)
Age: 64
End: 2024-03-01

## 2024-03-01 ENCOUNTER — APPOINTMENT (OUTPATIENT)
Dept: HEMATOLOGY ONCOLOGY | Facility: CLINIC | Age: 64
End: 2024-03-01
Payer: COMMERCIAL

## 2024-03-01 ENCOUNTER — OUTPATIENT (OUTPATIENT)
Dept: OUTPATIENT SERVICES | Facility: HOSPITAL | Age: 64
LOS: 1 days | End: 2024-03-01
Payer: COMMERCIAL

## 2024-03-01 VITALS
OXYGEN SATURATION: 98 % | SYSTOLIC BLOOD PRESSURE: 137 MMHG | RESPIRATION RATE: 16 BRPM | DIASTOLIC BLOOD PRESSURE: 83 MMHG | WEIGHT: 186.73 LBS | BODY MASS INDEX: 34.15 KG/M2 | TEMPERATURE: 97.3 F | HEART RATE: 77 BPM

## 2024-03-01 DIAGNOSIS — Z98.890 OTHER SPECIFIED POSTPROCEDURAL STATES: Chronic | ICD-10-CM

## 2024-03-01 DIAGNOSIS — C50.912 MALIGNANT NEOPLASM OF UNSPECIFIED SITE OF LEFT FEMALE BREAST: ICD-10-CM

## 2024-03-01 DIAGNOSIS — M25.552 PAIN IN LEFT HIP: ICD-10-CM

## 2024-03-01 DIAGNOSIS — Z90.13 ACQUIRED ABSENCE OF BILATERAL BREASTS AND NIPPLES: Chronic | ICD-10-CM

## 2024-03-01 DIAGNOSIS — R10.30 LOWER ABDOMINAL PAIN, UNSPECIFIED: ICD-10-CM

## 2024-03-01 DIAGNOSIS — G62.0 DRUG-INDUCED POLYNEUROPATHY: ICD-10-CM

## 2024-03-01 DIAGNOSIS — C50.919 MALIGNANT NEOPLASM OF UNSPECIFIED SITE OF UNSPECIFIED FEMALE BREAST: ICD-10-CM

## 2024-03-01 DIAGNOSIS — Z90.710 ACQUIRED ABSENCE OF BOTH CERVIX AND UTERUS: Chronic | ICD-10-CM

## 2024-03-01 DIAGNOSIS — Z17.0 MALIGNANT NEOPLASM OF UNSPECIFIED SITE OF LEFT FEMALE BREAST: ICD-10-CM

## 2024-03-01 LAB
25(OH)D3 SERPL-MCNC: 28.6 NG/ML
ALBUMIN SERPL ELPH-MCNC: 3.9 G/DL
ALP BLD-CCNC: 115 U/L
ALT SERPL-CCNC: 14 U/L
ANION GAP SERPL CALC-SCNC: 11 MMOL/L
AST SERPL-CCNC: 16 U/L
BASOPHILS # BLD AUTO: 0.02 K/UL — SIGNIFICANT CHANGE UP (ref 0–0.2)
BASOPHILS NFR BLD AUTO: 0.3 % — SIGNIFICANT CHANGE UP (ref 0–2)
BILIRUB SERPL-MCNC: 0.3 MG/DL
BUN SERPL-MCNC: 13 MG/DL
CALCIUM SERPL-MCNC: 9.1 MG/DL
CHLORIDE SERPL-SCNC: 101 MMOL/L
CO2 SERPL-SCNC: 24 MMOL/L
CREAT SERPL-MCNC: 0.72 MG/DL
EGFR: 93 ML/MIN/1.73M2
EOSINOPHIL # BLD AUTO: 0.11 K/UL — SIGNIFICANT CHANGE UP (ref 0–0.5)
EOSINOPHIL NFR BLD AUTO: 1.7 % — SIGNIFICANT CHANGE UP (ref 0–6)
GLUCOSE SERPL-MCNC: 95 MG/DL
HCT VFR BLD CALC: 38.6 % — SIGNIFICANT CHANGE UP (ref 34.5–45)
HGB BLD-MCNC: 12.2 G/DL — SIGNIFICANT CHANGE UP (ref 11.5–15.5)
IMM GRANULOCYTES NFR BLD AUTO: 0.2 % — SIGNIFICANT CHANGE UP (ref 0–0.9)
LYMPHOCYTES # BLD AUTO: 1.56 K/UL — SIGNIFICANT CHANGE UP (ref 1–3.3)
LYMPHOCYTES # BLD AUTO: 23.5 % — SIGNIFICANT CHANGE UP (ref 13–44)
MCHC RBC-ENTMCNC: 26.1 PG — LOW (ref 27–34)
MCHC RBC-ENTMCNC: 31.6 G/DL — LOW (ref 32–36)
MCV RBC AUTO: 82.5 FL — SIGNIFICANT CHANGE UP (ref 80–100)
MONOCYTES # BLD AUTO: 0.49 K/UL — SIGNIFICANT CHANGE UP (ref 0–0.9)
MONOCYTES NFR BLD AUTO: 7.4 % — SIGNIFICANT CHANGE UP (ref 2–14)
NEUTROPHILS # BLD AUTO: 4.44 K/UL — SIGNIFICANT CHANGE UP (ref 1.8–7.4)
NEUTROPHILS NFR BLD AUTO: 66.9 % — SIGNIFICANT CHANGE UP (ref 43–77)
NRBC # BLD: 0 /100 WBCS — SIGNIFICANT CHANGE UP (ref 0–0)
PLATELET # BLD AUTO: 222 K/UL — SIGNIFICANT CHANGE UP (ref 150–400)
POTASSIUM SERPL-SCNC: 4.3 MMOL/L
PROT SERPL-MCNC: 6.8 G/DL
RBC # BLD: 4.68 M/UL — SIGNIFICANT CHANGE UP (ref 3.8–5.2)
RBC # FLD: 13.7 % — SIGNIFICANT CHANGE UP (ref 10.3–14.5)
SODIUM SERPL-SCNC: 136 MMOL/L
WBC # BLD: 6.63 K/UL — SIGNIFICANT CHANGE UP (ref 3.8–10.5)
WBC # FLD AUTO: 6.63 K/UL — SIGNIFICANT CHANGE UP (ref 3.8–10.5)

## 2024-03-01 PROCEDURE — 73502 X-RAY EXAM HIP UNI 2-3 VIEWS: CPT

## 2024-03-01 PROCEDURE — 99215 OFFICE O/P EST HI 40 MIN: CPT

## 2024-03-01 PROCEDURE — 73502 X-RAY EXAM HIP UNI 2-3 VIEWS: CPT | Mod: 26,LT

## 2024-03-01 NOTE — CONSULT LETTER
[Dear  ___] : Dear  [unfilled], [Courtesy Letter:] : I had the pleasure of seeing your patient, [unfilled], in my office today. [Please see my note below.] : Please see my note below. [Consult Closing:] : Thank you very much for allowing me to participate in the care of this patient.  If you have any questions, please do not hesitate to contact me. [Sincerely,] : Sincerely, [FreeTextEntry2] : Heidy Hall  [facility] [address]  [place] [address] [FreeTextEntry3] : Richard Lucia MD Attending [facility] [DrCoral  ___] : Dr. WALLACE

## 2024-03-01 NOTE — ASSESSMENT
[FreeTextEntry1] : She is a 65 y/o AAF Stage III left invasive lobular carcinoma who was on anastrozole while awaiting surgery s/p lumpectomy with axillary lymph node dissection in 5/5/2020 followed by mastectomy 6/30/2020 with additional lymph nodes removed. She completed adjuvant dose dense AC followed by paclitaxel (week 12 held due to worsening neuropathy). She has completed RT with Dr Diehl and on anastrozole since 6/2021. Continues to tolerate anastrozole. Will obtain interval blood work today. We reviewed low fat diet and exercise daily to help improve breast cancer survival as per WINS study. Next follow up in 6 months but earlier if any new symptoms.  L hip pain: will obtain since fall on R hip with Xray done in [facility] 8/2023 but persistent L hip pain: will evaluate lytic lesions.   Abdominal tightness over the МАРИНА flap reconstruction: reviewed weight loss to decrease tightness and reviewed LLQ cramping sensation. Will obtain CT abd/ pelvis to evaluate for disease; last colonoscopy 1 year ago.   Lymphedema: continue with LUE sleeve.  Arthralgias: continue with supportive measures.   Bone density: Continue with exercise and calcium in diet. Will repeat 4/2024.   Paperwork for handicap sticker temporary filled out for pt: L hip pain and neuropathy.

## 2024-03-01 NOTE — HISTORY OF PRESENT ILLNESS
[Disease: _____________________] : Disease: [unfilled] [T: ___] : T[unfilled] [N: ___] : N[unfilled] [AJCC Stage: ____] : AJCC Stage: [unfilled] [de-identified] : Age 60: left axilla biopsy metastatic mammary carcinoma  Screening mammogram done on 12/7/2019 which showed enlarged left axillary lymph node for which ultrasound guided biopsy was recommended and done on 1/21/2020. Pathology showed metastatic mammary carcinoma with lobular and signet ring cell features that is ER > 90%, [place] 0%, and Odh1muy negative. MRI of the breast done 2/13/2020 showed nonmass enhancement in the upper inner left breast measuring 5 cm in AP dimension and abnormal left axillary lymph node with biopsy clip. Also 0.2 cm focus enhancing in the right breast. She underwent MRI guided biopsy of the left breast done on 3/11/2020. We reviewed neoadjuvant chemotherapy which she refused and was on anastrozole while awaiting surgery which was delayed due to covid. She underwent left lumpectomy with sentinel lymph node followed by left axillary lymph node dissection with Dr Hall on 5/5/2020. The pathology showed invasive lobular carcinoma, classic type, Grade 2, invasive component was 2.3 cm and 5 out of 7 lymph nodes positive for carcinoma: T2N2 ER 90%, [place] 0%, Sqq9laq negative. We performed CT of the chest/ abd/ pelvis along with bone scan 5/22/2020 which showed indeterminate liver lesion. She had MRI of the abdomen 6/22/2020 done which showed hemangioma. She underwent prophylactic nipple sparing prophylactic right mastectomy and left mastectomy with additional lymph node removal on 6/30/2020 with Dr Hall. The pathology showed fibrocystic changes in the right prophylactic mastectomy and negative sentinel lymph node. The left mastectomy showed LCIS along with 5 additional positive lymph nodes. She had adjuvant dose dense AC from 9/9/2020 to 10/21/2020 followed by weekly paclitaxel from 11/4/2020 to 1/2021. She started RT with Dr Diehl 3/2021.  [de-identified] : mammary carcinoma ER > 90%, [place] 0%, Rkw5aog negative  [de-identified] : Since last evaluation, she had seen PCP in August and had blood work. She had slipped and fell on to her R side and had Xray of the pelvis and hip in August at [facility]. She also saw PCP. She has been trying to exercise to help with wt gain. Feels persistent tightening over the abdomen and L sided groin pain. Feels pulling sensation over the groin: 7/10 chronic; improved by holding area and allowing tightness to pass. She feels on the L side not the R even though she fell on the R hip. Has not seen orthopedics. She has chronic constipation: taking laxatives: last colonoscopy 1 year ago that was reported as negative: LLQ cramping sensation.  [de-identified] : dose dense AC 9/9/2020 to 10/21/2020 \par  paclitaxel 11/4/2020 to 1/20/2021\par  anastrozole 6/2021 to present

## 2024-03-01 NOTE — PHYSICAL EXAM
[Restricted in physically strenuous activity but ambulatory and able to carry out work of a light or sedentary nature] : Status 1- Restricted in physically strenuous activity but ambulatory and able to carry out work of a light or sedentary nature, e.g., light house work, office work [Normal] : affect appropriate [de-identified] : B МАРИНА flap reconstruction with hyperpigmentation  [de-identified] : wears LUE lymphedema sleeve [de-identified] : pain with abduction and adduction of the L hip; negative SLR  [de-identified] :  strength 5/5 BUE, gait steady

## 2024-03-05 LAB — CANCER AG27-29 SERPL-ACNC: 25.3 U/ML

## 2024-03-06 ENCOUNTER — NON-APPOINTMENT (OUTPATIENT)
Age: 64
End: 2024-03-06

## 2024-04-02 ENCOUNTER — APPOINTMENT (OUTPATIENT)
Dept: CT IMAGING | Facility: CLINIC | Age: 64
End: 2024-04-02
Payer: COMMERCIAL

## 2024-04-02 ENCOUNTER — OUTPATIENT (OUTPATIENT)
Dept: OUTPATIENT SERVICES | Facility: HOSPITAL | Age: 64
LOS: 1 days | End: 2024-04-02
Payer: COMMERCIAL

## 2024-04-02 DIAGNOSIS — Z98.890 OTHER SPECIFIED POSTPROCEDURAL STATES: Chronic | ICD-10-CM

## 2024-04-02 DIAGNOSIS — Z90.710 ACQUIRED ABSENCE OF BOTH CERVIX AND UTERUS: Chronic | ICD-10-CM

## 2024-04-02 DIAGNOSIS — Z90.13 ACQUIRED ABSENCE OF BILATERAL BREASTS AND NIPPLES: Chronic | ICD-10-CM

## 2024-04-02 DIAGNOSIS — C50.912 MALIGNANT NEOPLASM OF UNSPECIFIED SITE OF LEFT FEMALE BREAST: ICD-10-CM

## 2024-04-02 PROCEDURE — 74177 CT ABD & PELVIS W/CONTRAST: CPT

## 2024-04-02 PROCEDURE — 74177 CT ABD & PELVIS W/CONTRAST: CPT | Mod: 26

## 2024-04-03 DIAGNOSIS — R19.04 LEFT LOWER QUADRANT ABDOMINAL SWELLING, MASS AND LUMP: ICD-10-CM

## 2024-05-25 ENCOUNTER — APPOINTMENT (OUTPATIENT)
Dept: NUCLEAR MEDICINE | Facility: IMAGING CENTER | Age: 64
End: 2024-05-25
Payer: COMMERCIAL

## 2024-05-25 ENCOUNTER — OUTPATIENT (OUTPATIENT)
Dept: OUTPATIENT SERVICES | Facility: HOSPITAL | Age: 64
LOS: 1 days | End: 2024-05-25
Payer: COMMERCIAL

## 2024-05-25 DIAGNOSIS — Z98.890 OTHER SPECIFIED POSTPROCEDURAL STATES: Chronic | ICD-10-CM

## 2024-05-25 DIAGNOSIS — Z90.710 ACQUIRED ABSENCE OF BOTH CERVIX AND UTERUS: Chronic | ICD-10-CM

## 2024-05-25 DIAGNOSIS — Z00.8 ENCOUNTER FOR OTHER GENERAL EXAMINATION: ICD-10-CM

## 2024-05-25 DIAGNOSIS — Z90.13 ACQUIRED ABSENCE OF BILATERAL BREASTS AND NIPPLES: Chronic | ICD-10-CM

## 2024-05-25 PROCEDURE — A9552: CPT

## 2024-05-25 PROCEDURE — 78815 PET IMAGE W/CT SKULL-THIGH: CPT | Mod: 26,PI

## 2024-05-25 PROCEDURE — 78815 PET IMAGE W/CT SKULL-THIGH: CPT

## 2024-06-07 DIAGNOSIS — N83.8 OTHER NONINFLAMMATORY DISORDERS OF OVARY, FALLOPIAN TUBE AND BROAD LIGAMENT: ICD-10-CM

## 2024-08-22 ENCOUNTER — OUTPATIENT (OUTPATIENT)
Dept: OUTPATIENT SERVICES | Facility: HOSPITAL | Age: 64
LOS: 1 days | Discharge: HOME | End: 2024-08-22

## 2024-08-22 DIAGNOSIS — Z98.890 OTHER SPECIFIED POSTPROCEDURAL STATES: Chronic | ICD-10-CM

## 2024-08-22 DIAGNOSIS — Z90.13 ACQUIRED ABSENCE OF BILATERAL BREASTS AND NIPPLES: Chronic | ICD-10-CM

## 2024-08-22 DIAGNOSIS — C50.912 MALIGNANT NEOPLASM OF UNSPECIFIED SITE OF LEFT FEMALE BREAST: ICD-10-CM

## 2024-08-22 DIAGNOSIS — Z90.710 ACQUIRED ABSENCE OF BOTH CERVIX AND UTERUS: Chronic | ICD-10-CM

## 2024-09-03 ENCOUNTER — NON-APPOINTMENT (OUTPATIENT)
Age: 64
End: 2024-09-03

## 2024-09-04 ENCOUNTER — APPOINTMENT (OUTPATIENT)
Dept: HEMATOLOGY ONCOLOGY | Facility: CLINIC | Age: 64
End: 2024-09-04
Payer: COMMERCIAL

## 2024-09-04 ENCOUNTER — RESULT REVIEW (OUTPATIENT)
Age: 64
End: 2024-09-04

## 2024-09-04 VITALS
HEART RATE: 67 BPM | RESPIRATION RATE: 16 BRPM | TEMPERATURE: 96.8 F | DIASTOLIC BLOOD PRESSURE: 75 MMHG | WEIGHT: 189.58 LBS | OXYGEN SATURATION: 99 % | SYSTOLIC BLOOD PRESSURE: 128 MMHG | BODY MASS INDEX: 34.68 KG/M2

## 2024-09-04 DIAGNOSIS — C50.912 MALIGNANT NEOPLASM OF UNSPECIFIED SITE OF LEFT FEMALE BREAST: ICD-10-CM

## 2024-09-04 DIAGNOSIS — Z17.0 MALIGNANT NEOPLASM OF UNSPECIFIED SITE OF LEFT FEMALE BREAST: ICD-10-CM

## 2024-09-04 DIAGNOSIS — Z79.811 LONG TERM (CURRENT) USE OF AROMATASE INHIBITORS: ICD-10-CM

## 2024-09-04 LAB
BASOPHILS # BLD AUTO: 0.01 K/UL — SIGNIFICANT CHANGE UP (ref 0–0.2)
BASOPHILS NFR BLD AUTO: 0.1 % — SIGNIFICANT CHANGE UP (ref 0–2)
EOSINOPHIL # BLD AUTO: 0.14 K/UL — SIGNIFICANT CHANGE UP (ref 0–0.5)
EOSINOPHIL NFR BLD AUTO: 1.9 % — SIGNIFICANT CHANGE UP (ref 0–6)
HCT VFR BLD CALC: 37 % — SIGNIFICANT CHANGE UP (ref 34.5–45)
HGB BLD-MCNC: 11.5 G/DL — SIGNIFICANT CHANGE UP (ref 11.5–15.5)
IMM GRANULOCYTES NFR BLD AUTO: 0.4 % — SIGNIFICANT CHANGE UP (ref 0–0.9)
LYMPHOCYTES # BLD AUTO: 2.02 K/UL — SIGNIFICANT CHANGE UP (ref 1–3.3)
LYMPHOCYTES # BLD AUTO: 28 % — SIGNIFICANT CHANGE UP (ref 13–44)
MCHC RBC-ENTMCNC: 25.4 PG — LOW (ref 27–34)
MCHC RBC-ENTMCNC: 31.1 G/DL — LOW (ref 32–36)
MCV RBC AUTO: 81.9 FL — SIGNIFICANT CHANGE UP (ref 80–100)
MONOCYTES # BLD AUTO: 0.58 K/UL — SIGNIFICANT CHANGE UP (ref 0–0.9)
MONOCYTES NFR BLD AUTO: 8 % — SIGNIFICANT CHANGE UP (ref 2–14)
NEUTROPHILS # BLD AUTO: 4.44 K/UL — SIGNIFICANT CHANGE UP (ref 1.8–7.4)
NEUTROPHILS NFR BLD AUTO: 61.6 % — SIGNIFICANT CHANGE UP (ref 43–77)
NRBC # BLD: 0 /100 WBCS — SIGNIFICANT CHANGE UP (ref 0–0)
NRBC BLD-RTO: 0 /100 WBCS — SIGNIFICANT CHANGE UP (ref 0–0)
PLATELET # BLD AUTO: 228 K/UL — SIGNIFICANT CHANGE UP (ref 150–400)
RBC # BLD: 4.52 M/UL — SIGNIFICANT CHANGE UP (ref 3.8–5.2)
RBC # FLD: 13.8 % — SIGNIFICANT CHANGE UP (ref 10.3–14.5)
WBC # BLD: 7.22 K/UL — SIGNIFICANT CHANGE UP (ref 3.8–10.5)
WBC # FLD AUTO: 7.22 K/UL — SIGNIFICANT CHANGE UP (ref 3.8–10.5)

## 2024-09-04 PROCEDURE — 99214 OFFICE O/P EST MOD 30 MIN: CPT

## 2024-09-04 PROCEDURE — G2211 COMPLEX E/M VISIT ADD ON: CPT | Mod: NC

## 2024-09-04 RX ORDER — TRIAMCINOLONE ACETONIDE 1 MG/G
0.1 OINTMENT TOPICAL TWICE DAILY
Qty: 1 | Refills: 1 | Status: ACTIVE | COMMUNITY
Start: 2024-09-04 | End: 1900-01-01

## 2024-09-04 RX ORDER — B-COMPLEX WITH VITAMIN C
TABLET ORAL
Refills: 0 | Status: ACTIVE | COMMUNITY
Start: 2024-09-04

## 2024-09-04 NOTE — BEGINNING OF VISIT
[0] : 2) Feeling down, depressed, or hopeless: Not at all (0) [PHQ-2 Negative] : PHQ-2 Negative [Pain Scale: ___] : On a scale of 1-10, today the patient's pain is a(n) [unfilled]. [Never] : Never [Date Discussed (MM/DD/YY): ___] : Discussed: [unfilled] [With Patient/Caregiver] : with Patient/Caregiver Statement Selected

## 2024-09-05 LAB
ALBUMIN SERPL ELPH-MCNC: 3.9 G/DL
ALP BLD-CCNC: 121 U/L
ALT SERPL-CCNC: 14 U/L
ANION GAP SERPL CALC-SCNC: 12 MMOL/L
AST SERPL-CCNC: 19 U/L
BILIRUB SERPL-MCNC: 0.4 MG/DL
BUN SERPL-MCNC: 13 MG/DL
CALCIUM SERPL-MCNC: 9.2 MG/DL
CANCER AG27-29 SERPL-ACNC: 29.9 U/ML
CHLORIDE SERPL-SCNC: 102 MMOL/L
CHOLEST SERPL-MCNC: 214 MG/DL
CO2 SERPL-SCNC: 24 MMOL/L
CREAT SERPL-MCNC: 0.74 MG/DL
EGFR: 90 ML/MIN/1.73M2
ESTIMATED AVERAGE GLUCOSE: 120 MG/DL
GLUCOSE SERPL-MCNC: 97 MG/DL
HBA1C MFR BLD HPLC: 5.8 %
HCT VFR BLD CALC: 37 %
HDLC SERPL-MCNC: 59 MG/DL
HGB BLD-MCNC: 11.5 G/DL
LDLC SERPL CALC-MCNC: 141 MG/DL
MCHC RBC-ENTMCNC: 25.5 PG
MCHC RBC-ENTMCNC: 31.1 GM/DL
MCV RBC AUTO: 82 FL
NONHDLC SERPL-MCNC: 155 MG/DL
PLATELET # BLD AUTO: 236 K/UL
POTASSIUM SERPL-SCNC: 4.2 MMOL/L
PROT SERPL-MCNC: 6.9 G/DL
RBC # BLD: 4.51 M/UL
RBC # FLD: 13.8 %
SODIUM SERPL-SCNC: 137 MMOL/L
TRIGL SERPL-MCNC: 77 MG/DL
WBC # FLD AUTO: 7.17 K/UL

## 2024-09-07 NOTE — HISTORY OF PRESENT ILLNESS
[de-identified] : Age 60: left axilla biopsy metastatic mammary carcinoma Screening mammogram done on 12/7/2019 which showed enlarged left axillary lymph node for which ultrasound guided biopsy was recommended and done on 1/21/2020. Pathology showed metastatic mammary carcinoma with lobular and signet ring cell features that is ER > 90%, CA 0%, and [id number] negative. MRI of the breast done 2/13/2020 showed nonmass enhancement in the upper inner left breast measuring 5 cm in AP dimension and abnormal left axillary lymph node with biopsy clip. Also 0.2 cm focus enhancing in the right breast. She underwent MRI guided biopsy of the left breast done on 3/11/2020. We reviewed neoadjuvant chemotherapy which she refused and was on anastrozole while awaiting surgery which was delayed due to covid. She underwent left lumpectomy with sentinel lymph node followed by left axillary lymph node dissection with Dr Hall on 5/5/2020. The pathology showed invasive lobular carcinoma, classic type, Grade 2, invasive component was 2.3 cm and 5 out of 7 lymph nodes positive for carcinoma: T2N2 ER 90%, CA 0%, [id number] negative. We performed CT of the chest/ abd/ pelvis along with bone scan 5/22/2020 which showed indeterminate liver lesion. She had MRI of the abdomen 6/22/2020 done which showed hemangioma. She underwent prophylactic nipple sparing prophylactic right mastectomy and left mastectomy with additional lymph node removal on 6/30/2020 with Dr Hall. The pathology showed fibrocystic changes in the right prophylactic mastectomy and negative sentinel lymph node. The left mastectomy showed LCIS along with 5 additional positive lymph nodes. She had adjuvant dose dense AC from 9/9/2020 to 10/21/2020 followed by weekly paclitaxel from 11/4/2020 to 1/2021. She started RT with Dr Diehl 3/2021.   Disease: breast cancer    Pathology: mammary carcinoma ER > 90%, CA 0%, [id number] negative   TNM stage: T2, N3 AJCC Stage: III B     dose dense AC 9/9/2020 to 10/21/2020 paclitaxel 11/4/2020 to 1/20/2021 anastrozole 6/2021 to present   [de-identified] : PET/CT with c/f ovarian enlargement so MRI ordered but pt has not yet scheduled appt. Denies abdominal pain, SOB, chest pain, or new sxs. She has chronic constipation: taking laxatives: last colonoscopy 1 year ago that was reported as negative: Feeling well overall.

## 2024-09-07 NOTE — ASSESSMENT
[FreeTextEntry1] : She is a 63 y/o AAF Stage III left invasive lobular carcinoma who was on anastrozole while awaiting surgery s/p lumpectomy with axillary lymph node dissection in 5/5/2020 followed by mastectomy 6/30/2020 with additional lymph nodes removed. She completed adjuvant dose dense AC followed by paclitaxel (week 12 held due to worsening neuropathy). She has completed RT with Dr Diehl and on anastrozole since 6/2021. Continues to tolerate anastrozole. We reviewed signs and symptoms of breast cancer recurrence. No new signs or symptoms of recurrence. She will have interval blood work done today. Questions answered to her satisfaction. She is agreeable with plan. Next follow up in 6 months but earlier if any new symptoms.  Lymphedema: continue with LUE sleeve.  Bone density: Last bone density done 2022: will have bone density done this year. We reviewed calcium and Vitamin D supplementation along with exercise to maintain bone health.

## 2024-09-07 NOTE — HISTORY OF PRESENT ILLNESS
[de-identified] : Age 60: left axilla biopsy metastatic mammary carcinoma Screening mammogram done on 12/7/2019 which showed enlarged left axillary lymph node for which ultrasound guided biopsy was recommended and done on 1/21/2020. Pathology showed metastatic mammary carcinoma with lobular and signet ring cell features that is ER > 90%, KY 0%, and [id number] negative. MRI of the breast done 2/13/2020 showed nonmass enhancement in the upper inner left breast measuring 5 cm in AP dimension and abnormal left axillary lymph node with biopsy clip. Also 0.2 cm focus enhancing in the right breast. She underwent MRI guided biopsy of the left breast done on 3/11/2020. We reviewed neoadjuvant chemotherapy which she refused and was on anastrozole while awaiting surgery which was delayed due to covid. She underwent left lumpectomy with sentinel lymph node followed by left axillary lymph node dissection with Dr Hall on 5/5/2020. The pathology showed invasive lobular carcinoma, classic type, Grade 2, invasive component was 2.3 cm and 5 out of 7 lymph nodes positive for carcinoma: T2N2 ER 90%, KY 0%, [id number] negative. We performed CT of the chest/ abd/ pelvis along with bone scan 5/22/2020 which showed indeterminate liver lesion. She had MRI of the abdomen 6/22/2020 done which showed hemangioma. She underwent prophylactic nipple sparing prophylactic right mastectomy and left mastectomy with additional lymph node removal on 6/30/2020 with Dr Hall. The pathology showed fibrocystic changes in the right prophylactic mastectomy and negative sentinel lymph node. The left mastectomy showed LCIS along with 5 additional positive lymph nodes. She had adjuvant dose dense AC from 9/9/2020 to 10/21/2020 followed by weekly paclitaxel from 11/4/2020 to 1/2021. She started RT with Dr Diehl 3/2021.   Disease: breast cancer    Pathology: mammary carcinoma ER > 90%, [place] 0%, Sga4viy negative   TNM stage: T2, N3 AJCC Stage: III B     dose dense AC 9/9/2020 to 10/21/2020 paclitaxel 11/4/2020 to 1/20/2021 anastrozole 6/2021 to present   [de-identified] : PET/CT with c/f ovarian enlargement so MRI ordered but pt has not yet scheduled appt. Denies abdominal pain, SOB, chest pain, or new sxs. She has chronic constipation: taking laxatives: last colonoscopy 1 year ago that was reported as negative: Feeling well overall.

## 2024-09-07 NOTE — HISTORY OF PRESENT ILLNESS
[de-identified] : Age 60: left axilla biopsy metastatic mammary carcinoma Screening mammogram done on 12/7/2019 which showed enlarged left axillary lymph node for which ultrasound guided biopsy was recommended and done on 1/21/2020. Pathology showed metastatic mammary carcinoma with lobular and signet ring cell features that is ER > 90%, [place] 0%, and Qem5ecy negative. MRI of the breast done 2/13/2020 showed nonmass enhancement in the upper inner left breast measuring 5 cm in AP dimension and abnormal left axillary lymph node with biopsy clip. Also 0.2 cm focus enhancing in the right breast. She underwent MRI guided biopsy of the left breast done on 3/11/2020. We reviewed neoadjuvant chemotherapy which she refused and was on anastrozole while awaiting surgery which was delayed due to covid. She underwent left lumpectomy with sentinel lymph node followed by left axillary lymph node dissection with Dr Hall on 5/5/2020. The pathology showed invasive lobular carcinoma, classic type, Grade 2, invasive component was 2.3 cm and 5 out of 7 lymph nodes positive for carcinoma: T2N2 ER 90%, [place] 0%, [id number] negative. We performed CT of the chest/ abd/ pelvis along with bone scan 5/22/2020 which showed indeterminate liver lesion. She had MRI of the abdomen 6/22/2020 done which showed hemangioma. She underwent prophylactic nipple sparing prophylactic right mastectomy and left mastectomy with additional lymph node removal on 6/30/2020 with Dr Hall. The pathology showed fibrocystic changes in the right prophylactic mastectomy and negative sentinel lymph node. The left mastectomy showed LCIS along with 5 additional positive lymph nodes. She had adjuvant dose dense AC from 9/9/2020 to 10/21/2020 followed by weekly paclitaxel from 11/4/2020 to 1/2021. She started RT with Dr Diehl 3/2021.   Disease: breast cancer    Pathology: mammary carcinoma ER > 90%, [place] 0%, Vse4ldp negative   TNM stage: T2, N3 AJCC Stage: III B     dose dense AC 9/9/2020 to 10/21/2020 paclitaxel 11/4/2020 to 1/20/2021 anastrozole 6/2021 to present   [de-identified] : PET/CT with c/f ovarian enlargement so MRI ordered but pt has not yet scheduled appt. Denies abdominal pain, SOB, chest pain, or new sxs. She has chronic constipation: taking laxatives: last colonoscopy 1 year ago that was reported as negative: Feeling well overall.

## 2024-10-01 NOTE — H&P PST ADULT - AIRWAY
normal Duration Of Freeze Thaw-Cycle (Seconds): 0 Render Post-Care Instructions In Note?: no Show Aperture Variable?: Yes Consent: The patient's consent was obtained including but not limited to risks of crusting, scabbing, blistering, scarring, darker or lighter pigmentary change, recurrence, incomplete removal and infection. Detail Level: Detailed Post-Care Instructions: I reviewed with the patient in detail post-care instructions. Patient is to wear sunprotection, and avoid picking at any of the treated lesions. Pt may apply Vaseline to crusted or scabbing areas. Medical Necessity Clause: This procedure was medically necessary because the lesions that were treated were: Medical Necessity Information: It is in your best interest to select a reason for this procedure from the list below. All of these items fulfill various CMS LCD requirements except the new and changing color options. Spray Paint Text: The liquid nitrogen was applied to the skin utilizing a spray paint frosting technique.

## 2024-11-16 NOTE — H&P PST ADULT - NSICDXPASTMEDICALHX_GEN_ALL_CORE_FT
Attending Only
PAST MEDICAL HISTORY:  Obesity     JOSH (obstructive sleep apnea) CPAP recommended, pt refused

## 2024-12-29 ENCOUNTER — OUTPATIENT (OUTPATIENT)
Dept: OUTPATIENT SERVICES | Facility: HOSPITAL | Age: 64
LOS: 1 days | End: 2024-12-29
Payer: COMMERCIAL

## 2024-12-29 DIAGNOSIS — C50.912 MALIGNANT NEOPLASM OF UNSPECIFIED SITE OF LEFT FEMALE BREAST: ICD-10-CM

## 2024-12-29 DIAGNOSIS — Z90.710 ACQUIRED ABSENCE OF BOTH CERVIX AND UTERUS: Chronic | ICD-10-CM

## 2024-12-29 DIAGNOSIS — Z98.890 OTHER SPECIFIED POSTPROCEDURAL STATES: Chronic | ICD-10-CM

## 2024-12-29 DIAGNOSIS — Z90.13 ACQUIRED ABSENCE OF BILATERAL BREASTS AND NIPPLES: Chronic | ICD-10-CM

## 2024-12-29 PROCEDURE — 72197 MRI PELVIS W/O & W/DYE: CPT

## 2024-12-29 PROCEDURE — A9585: CPT

## 2024-12-30 ENCOUNTER — NON-APPOINTMENT (OUTPATIENT)
Age: 64
End: 2024-12-30

## 2024-12-30 ENCOUNTER — APPOINTMENT (OUTPATIENT)
Dept: INTERNAL MEDICINE | Facility: CLINIC | Age: 64
End: 2024-12-30

## 2024-12-30 VITALS
HEART RATE: 77 BPM | SYSTOLIC BLOOD PRESSURE: 127 MMHG | WEIGHT: 186 LBS | OXYGEN SATURATION: 98 % | DIASTOLIC BLOOD PRESSURE: 79 MMHG | BODY MASS INDEX: 34.23 KG/M2 | TEMPERATURE: 97.7 F | HEIGHT: 61.81 IN

## 2024-12-30 DIAGNOSIS — Z00.00 ENCOUNTER FOR GENERAL ADULT MEDICAL EXAMINATION W/OUT ABNORMAL FINDINGS: ICD-10-CM

## 2024-12-30 DIAGNOSIS — Z13.820 ENCOUNTER FOR SCREENING FOR OSTEOPOROSIS: ICD-10-CM

## 2024-12-30 DIAGNOSIS — G47.9 SLEEP DISORDER, UNSPECIFIED: ICD-10-CM

## 2024-12-30 DIAGNOSIS — R74.8 ABNORMAL LEVELS OF OTHER SERUM ENZYMES: ICD-10-CM

## 2024-12-30 DIAGNOSIS — Z13.31 ENCOUNTER FOR SCREENING FOR DEPRESSION: ICD-10-CM

## 2024-12-30 DIAGNOSIS — Z13.228 ENCOUNTER FOR SCREENING FOR OTHER METABOLIC DISORDERS: ICD-10-CM

## 2024-12-30 DIAGNOSIS — Z13.6 ENCOUNTER FOR SCREENING FOR CARDIOVASCULAR DISORDERS: ICD-10-CM

## 2024-12-30 DIAGNOSIS — Z23 ENCOUNTER FOR IMMUNIZATION: ICD-10-CM

## 2024-12-30 PROCEDURE — G0009: CPT

## 2024-12-30 PROCEDURE — 81003 URINALYSIS AUTO W/O SCOPE: CPT | Mod: QW

## 2024-12-30 PROCEDURE — 93000 ELECTROCARDIOGRAM COMPLETE: CPT | Mod: 59

## 2024-12-30 PROCEDURE — 90677 PCV20 VACCINE IM: CPT

## 2024-12-30 PROCEDURE — 36415 COLL VENOUS BLD VENIPUNCTURE: CPT

## 2024-12-30 PROCEDURE — G0444 DEPRESSION SCREEN ANNUAL: CPT | Mod: 59

## 2024-12-30 PROCEDURE — 99386 PREV VISIT NEW AGE 40-64: CPT | Mod: 25

## 2024-12-30 PROCEDURE — G0447 BEHAVIOR COUNSEL OBESITY 15M: CPT | Mod: NC

## 2025-01-03 DIAGNOSIS — N83.8 OTHER NONINFLAMMATORY DISORDERS OF OVARY, FALLOPIAN TUBE AND BROAD LIGAMENT: ICD-10-CM

## 2025-01-06 ENCOUNTER — NON-APPOINTMENT (OUTPATIENT)
Age: 65
End: 2025-01-06

## 2025-01-06 ENCOUNTER — APPOINTMENT (OUTPATIENT)
Dept: CARDIOLOGY | Facility: CLINIC | Age: 65
End: 2025-01-06
Payer: COMMERCIAL

## 2025-01-06 VITALS
BODY MASS INDEX: 34.93 KG/M2 | WEIGHT: 185 LBS | TEMPERATURE: 97.5 F | SYSTOLIC BLOOD PRESSURE: 118 MMHG | DIASTOLIC BLOOD PRESSURE: 67 MMHG | HEIGHT: 61 IN | HEART RATE: 73 BPM

## 2025-01-06 DIAGNOSIS — R07.89 OTHER CHEST PAIN: ICD-10-CM

## 2025-01-06 DIAGNOSIS — R06.02 SHORTNESS OF BREATH: ICD-10-CM

## 2025-01-06 DIAGNOSIS — E78.00 PURE HYPERCHOLESTEROLEMIA, UNSPECIFIED: ICD-10-CM

## 2025-01-06 DIAGNOSIS — E66.811 OBESITY, CLASS 1: ICD-10-CM

## 2025-01-06 DIAGNOSIS — E66.9 OBESITY, UNSPECIFIED: ICD-10-CM

## 2025-01-06 DIAGNOSIS — R94.31 ABNORMAL ELECTROCARDIOGRAM [ECG] [EKG]: ICD-10-CM

## 2025-01-06 LAB
25(OH)D3 SERPL-MCNC: 25.2 NG/ML
ALBUMIN SERPL ELPH-MCNC: 3.8 G/DL
ALP BLD-CCNC: 111 U/L
ALT SERPL-CCNC: 7 U/L
ANION GAP SERPL CALC-SCNC: 9 MMOL/L
APPEARANCE: ABNORMAL
AST SERPL-CCNC: 13 U/L
BACTERIA: NEGATIVE /HPF
BILIRUB SERPL-MCNC: 0.2 MG/DL
BILIRUBIN URINE: NEGATIVE
BLOOD URINE: NEGATIVE
BUN SERPL-MCNC: 16 MG/DL
CALCIUM SERPL-MCNC: 9.6 MG/DL
CAST: 2 /LPF
CHLORIDE SERPL-SCNC: 105 MMOL/L
CHOLEST SERPL-MCNC: 230 MG/DL
CO2 SERPL-SCNC: 25 MMOL/L
COLOR: YELLOW
CREAT SERPL-MCNC: 0.72 MG/DL
EGFR: 93 ML/MIN/1.73M2
EPITHELIAL CELLS: 9 /HPF
ESTIMATED AVERAGE GLUCOSE: 123 MG/DL
GGT SERPL-CCNC: 14 U/L
GLUCOSE QUALITATIVE U: NEGATIVE MG/DL
GLUCOSE SERPL-MCNC: 88 MG/DL
HBA1C MFR BLD HPLC: 5.9 %
HCT VFR BLD CALC: 39.5 %
HCV AB SER QL: NONREACTIVE
HCV S/CO RATIO: 0.09 S/CO
HDLC SERPL-MCNC: 50 MG/DL
HGB BLD-MCNC: 12.2 G/DL
HIV1+2 AB SPEC QL IA.RAPID: NONREACTIVE
KETONES URINE: ABNORMAL MG/DL
LDLC SERPL CALC-MCNC: 159 MG/DL
LEUKOCYTE ESTERASE URINE: ABNORMAL
MCHC RBC-ENTMCNC: 26.1 PG
MCHC RBC-ENTMCNC: 30.9 G/DL
MCV RBC AUTO: 84.4 FL
MICROSCOPIC-UA: NORMAL
NITRITE URINE: NEGATIVE
NONHDLC SERPL-MCNC: 180 MG/DL
PH URINE: 5.5
PLATELET # BLD AUTO: 280 K/UL
POTASSIUM SERPL-SCNC: 4.3 MMOL/L
PROT SERPL-MCNC: 7.1 G/DL
PROTEIN URINE: 30 MG/DL
RBC # BLD: 4.68 M/UL
RBC # FLD: 14.6 %
RED BLOOD CELLS URINE: 1 /HPF
SODIUM SERPL-SCNC: 140 MMOL/L
SPECIFIC GRAVITY URINE: 1.03
TRIGL SERPL-MCNC: 114 MG/DL
TSH SERPL-ACNC: 1.18 UIU/ML
UROBILINOGEN URINE: 0.2 MG/DL
VIT B12 SERPL-MCNC: 398 PG/ML
WBC # FLD AUTO: 7.64 K/UL
WHITE BLOOD CELLS URINE: 43 /HPF

## 2025-01-06 PROCEDURE — 99204 OFFICE O/P NEW MOD 45 MIN: CPT

## 2025-01-07 ENCOUNTER — OUTPATIENT (OUTPATIENT)
Dept: OUTPATIENT SERVICES | Facility: HOSPITAL | Age: 65
LOS: 1 days | End: 2025-01-07

## 2025-01-07 ENCOUNTER — NON-APPOINTMENT (OUTPATIENT)
Age: 65
End: 2025-01-07

## 2025-01-07 ENCOUNTER — APPOINTMENT (OUTPATIENT)
Dept: FAMILY MEDICINE | Facility: CLINIC | Age: 65
End: 2025-01-07

## 2025-01-07 ENCOUNTER — APPOINTMENT (OUTPATIENT)
Dept: INTERNAL MEDICINE | Facility: CLINIC | Age: 65
End: 2025-01-07

## 2025-01-07 VITALS — HEIGHT: 61 IN | BODY MASS INDEX: 34.93 KG/M2 | WEIGHT: 185 LBS

## 2025-01-07 DIAGNOSIS — Z98.890 OTHER SPECIFIED POSTPROCEDURAL STATES: Chronic | ICD-10-CM

## 2025-01-07 PROBLEM — E66.9 OBESITY: Status: ACTIVE | Noted: 2025-01-07

## 2025-01-08 ENCOUNTER — APPOINTMENT (OUTPATIENT)
Dept: SURGERY | Facility: CLINIC | Age: 65
End: 2025-01-08
Payer: COMMERCIAL

## 2025-01-08 ENCOUNTER — OUTPATIENT (OUTPATIENT)
Dept: OUTPATIENT SERVICES | Facility: HOSPITAL | Age: 65
LOS: 1 days | End: 2025-01-08
Payer: COMMERCIAL

## 2025-01-08 VITALS
SYSTOLIC BLOOD PRESSURE: 149 MMHG | WEIGHT: 185 LBS | BODY MASS INDEX: 34.93 KG/M2 | TEMPERATURE: 98 F | HEART RATE: 77 BPM | DIASTOLIC BLOOD PRESSURE: 81 MMHG | OXYGEN SATURATION: 95 % | HEIGHT: 61 IN

## 2025-01-08 DIAGNOSIS — Z98.890 OTHER SPECIFIED POSTPROCEDURAL STATES: Chronic | ICD-10-CM

## 2025-01-08 DIAGNOSIS — Z90.710 ACQUIRED ABSENCE OF BOTH CERVIX AND UTERUS: Chronic | ICD-10-CM

## 2025-01-08 DIAGNOSIS — R73.03 PREDIABETES.: ICD-10-CM

## 2025-01-08 DIAGNOSIS — Z90.13 ACQUIRED ABSENCE OF BILATERAL BREASTS AND NIPPLES: Chronic | ICD-10-CM

## 2025-01-08 DIAGNOSIS — G47.33 OBSTRUCTIVE SLEEP APNEA (ADULT) (PEDIATRIC): ICD-10-CM

## 2025-01-08 DIAGNOSIS — E66.9 OBESITY, UNSPECIFIED: ICD-10-CM

## 2025-01-08 PROCEDURE — 99204 OFFICE O/P NEW MOD 45 MIN: CPT

## 2025-01-08 PROCEDURE — 95800 SLP STDY UNATTENDED: CPT

## 2025-01-08 PROCEDURE — 95800 SLP STDY UNATTENDED: CPT | Mod: 26

## 2025-01-16 RX ORDER — SEMAGLUTIDE 0.25 MG/.5ML
0.25 INJECTION, SOLUTION SUBCUTANEOUS
Qty: 4 | Refills: 0 | Status: ACTIVE | COMMUNITY
Start: 2025-01-08

## 2025-01-22 ENCOUNTER — APPOINTMENT (OUTPATIENT)
Dept: CARDIOLOGY | Facility: CLINIC | Age: 65
End: 2025-01-22

## 2025-01-22 DIAGNOSIS — R94.39 ABNORMAL RESULT OF OTHER CARDIOVASCULAR FUNCTION STUDY: ICD-10-CM

## 2025-01-22 PROCEDURE — 93351 STRESS TTE COMPLETE: CPT

## 2025-01-22 PROCEDURE — 93306 TTE W/DOPPLER COMPLETE: CPT | Mod: 59

## 2025-01-23 ENCOUNTER — NON-APPOINTMENT (OUTPATIENT)
Age: 65
End: 2025-01-23

## 2025-01-24 ENCOUNTER — APPOINTMENT (OUTPATIENT)
Dept: SURGERY | Facility: CLINIC | Age: 65
End: 2025-01-24

## 2025-01-24 VITALS
OXYGEN SATURATION: 98 % | BODY MASS INDEX: 35.5 KG/M2 | SYSTOLIC BLOOD PRESSURE: 137 MMHG | DIASTOLIC BLOOD PRESSURE: 78 MMHG | WEIGHT: 188 LBS | HEIGHT: 61 IN | TEMPERATURE: 97.6 F | HEART RATE: 77 BPM

## 2025-01-24 PROCEDURE — 97802 MEDICAL NUTRITION INDIV IN: CPT

## 2025-01-28 ENCOUNTER — NON-APPOINTMENT (OUTPATIENT)
Age: 65
End: 2025-01-28

## 2025-01-28 ENCOUNTER — APPOINTMENT (OUTPATIENT)
Dept: OTOLARYNGOLOGY | Facility: CLINIC | Age: 65
End: 2025-01-28
Payer: COMMERCIAL

## 2025-01-28 VITALS
WEIGHT: 188 LBS | BODY MASS INDEX: 35.5 KG/M2 | HEIGHT: 61 IN | HEART RATE: 75 BPM | DIASTOLIC BLOOD PRESSURE: 79 MMHG | TEMPERATURE: 98 F | SYSTOLIC BLOOD PRESSURE: 128 MMHG

## 2025-01-28 DIAGNOSIS — R06.83 SNORING: ICD-10-CM

## 2025-01-28 DIAGNOSIS — J34.2 DEVIATED NASAL SEPTUM: ICD-10-CM

## 2025-01-28 DIAGNOSIS — G47.33 OBSTRUCTIVE SLEEP APNEA (ADULT) (PEDIATRIC): ICD-10-CM

## 2025-01-28 PROCEDURE — 99203 OFFICE O/P NEW LOW 30 MIN: CPT | Mod: 25

## 2025-01-28 PROCEDURE — 31231 NASAL ENDOSCOPY DX: CPT

## 2025-01-29 ENCOUNTER — RESULT REVIEW (OUTPATIENT)
Age: 65
End: 2025-01-29

## 2025-01-29 ENCOUNTER — OUTPATIENT (OUTPATIENT)
Dept: OUTPATIENT SERVICES | Facility: HOSPITAL | Age: 65
LOS: 1 days | End: 2025-01-29
Payer: COMMERCIAL

## 2025-01-29 ENCOUNTER — APPOINTMENT (OUTPATIENT)
Dept: RADIOLOGY | Facility: IMAGING CENTER | Age: 65
End: 2025-01-29
Payer: COMMERCIAL

## 2025-01-29 DIAGNOSIS — Z00.00 ENCOUNTER FOR GENERAL ADULT MEDICAL EXAMINATION WITHOUT ABNORMAL FINDINGS: ICD-10-CM

## 2025-01-29 DIAGNOSIS — Z90.710 ACQUIRED ABSENCE OF BOTH CERVIX AND UTERUS: Chronic | ICD-10-CM

## 2025-01-29 DIAGNOSIS — Z00.8 ENCOUNTER FOR OTHER GENERAL EXAMINATION: ICD-10-CM

## 2025-01-29 DIAGNOSIS — Z98.890 OTHER SPECIFIED POSTPROCEDURAL STATES: Chronic | ICD-10-CM

## 2025-01-29 DIAGNOSIS — C50.912 MALIGNANT NEOPLASM OF UNSPECIFIED SITE OF LEFT FEMALE BREAST: ICD-10-CM

## 2025-01-29 DIAGNOSIS — Z90.13 ACQUIRED ABSENCE OF BILATERAL BREASTS AND NIPPLES: Chronic | ICD-10-CM

## 2025-01-29 PROCEDURE — 77080 DXA BONE DENSITY AXIAL: CPT

## 2025-01-29 PROCEDURE — 77085 DXA BONE DENSITY AXL VRT FX: CPT | Mod: 26

## 2025-01-29 PROCEDURE — 77085 DXA BONE DENSITY AXL VRT FX: CPT

## 2025-01-30 ENCOUNTER — APPOINTMENT (OUTPATIENT)
Dept: NUCLEAR MEDICINE | Facility: CLINIC | Age: 65
End: 2025-01-30

## 2025-02-12 ENCOUNTER — APPOINTMENT (OUTPATIENT)
Dept: NUCLEAR MEDICINE | Facility: CLINIC | Age: 65
End: 2025-02-12

## 2025-02-12 PROCEDURE — A9591: CPT

## 2025-02-12 PROCEDURE — 78816 PET IMAGE W/CT FULL BODY: CPT | Mod: PI

## 2025-02-14 ENCOUNTER — NON-APPOINTMENT (OUTPATIENT)
Age: 65
End: 2025-02-14

## 2025-02-19 ENCOUNTER — OUTPATIENT (OUTPATIENT)
Dept: OUTPATIENT SERVICES | Facility: HOSPITAL | Age: 65
LOS: 1 days | End: 2025-02-19
Payer: MEDICARE

## 2025-02-19 ENCOUNTER — RESULT REVIEW (OUTPATIENT)
Age: 65
End: 2025-02-19

## 2025-02-19 ENCOUNTER — APPOINTMENT (OUTPATIENT)
Dept: CT IMAGING | Facility: IMAGING CENTER | Age: 65
End: 2025-02-19
Payer: MEDICARE

## 2025-02-19 DIAGNOSIS — Z98.890 OTHER SPECIFIED POSTPROCEDURAL STATES: Chronic | ICD-10-CM

## 2025-02-19 DIAGNOSIS — Z90.710 ACQUIRED ABSENCE OF BOTH CERVIX AND UTERUS: Chronic | ICD-10-CM

## 2025-02-19 DIAGNOSIS — R94.39 ABNORMAL RESULT OF OTHER CARDIOVASCULAR FUNCTION STUDY: ICD-10-CM

## 2025-02-19 DIAGNOSIS — Z90.13 ACQUIRED ABSENCE OF BILATERAL BREASTS AND NIPPLES: Chronic | ICD-10-CM

## 2025-02-19 DIAGNOSIS — R94.31 ABNORMAL ELECTROCARDIOGRAM [ECG] [EKG]: ICD-10-CM

## 2025-02-19 PROCEDURE — 75574 CT ANGIO HRT W/3D IMAGE: CPT | Mod: 26

## 2025-02-19 PROCEDURE — 36410 VNPNXR 3YR/> PHY/QHP DX/THER: CPT

## 2025-02-19 PROCEDURE — 76937 US GUIDE VASCULAR ACCESS: CPT | Mod: 26

## 2025-02-19 PROCEDURE — 75574 CT ANGIO HRT W/3D IMAGE: CPT | Mod: MH

## 2025-02-19 PROCEDURE — 76937 US GUIDE VASCULAR ACCESS: CPT

## 2025-02-26 ENCOUNTER — APPOINTMENT (OUTPATIENT)
Dept: SURGERY | Facility: CLINIC | Age: 65
End: 2025-02-26

## 2025-02-26 ENCOUNTER — NON-APPOINTMENT (OUTPATIENT)
Age: 65
End: 2025-02-26

## 2025-02-28 ENCOUNTER — APPOINTMENT (OUTPATIENT)
Dept: GYNECOLOGIC ONCOLOGY | Facility: CLINIC | Age: 65
End: 2025-02-28
Payer: MEDICARE

## 2025-02-28 VITALS
HEART RATE: 124 BPM | OXYGEN SATURATION: 96 % | WEIGHT: 188 LBS | RESPIRATION RATE: 17 BRPM | DIASTOLIC BLOOD PRESSURE: 84 MMHG | BODY MASS INDEX: 35.5 KG/M2 | HEIGHT: 61 IN | SYSTOLIC BLOOD PRESSURE: 134 MMHG | TEMPERATURE: 97.3 F

## 2025-02-28 DIAGNOSIS — N83.8 OTHER NONINFLAMMATORY DISORDERS OF OVARY, FALLOPIAN TUBE AND BROAD LIGAMENT: ICD-10-CM

## 2025-02-28 DIAGNOSIS — D06.9 CARCINOMA IN SITU OF CERVIX, UNSPECIFIED: ICD-10-CM

## 2025-02-28 PROCEDURE — 99204 OFFICE O/P NEW MOD 45 MIN: CPT

## 2025-02-28 PROCEDURE — 99459 PELVIC EXAMINATION: CPT

## 2025-03-04 ENCOUNTER — OUTPATIENT (OUTPATIENT)
Dept: OUTPATIENT SERVICES | Facility: HOSPITAL | Age: 65
LOS: 1 days | Discharge: HOME | End: 2025-03-04

## 2025-03-04 DIAGNOSIS — Z98.890 OTHER SPECIFIED POSTPROCEDURAL STATES: Chronic | ICD-10-CM

## 2025-03-04 DIAGNOSIS — C50.912 MALIGNANT NEOPLASM OF UNSPECIFIED SITE OF LEFT FEMALE BREAST: ICD-10-CM

## 2025-03-04 DIAGNOSIS — Z90.13 ACQUIRED ABSENCE OF BILATERAL BREASTS AND NIPPLES: Chronic | ICD-10-CM

## 2025-03-04 DIAGNOSIS — Z90.710 ACQUIRED ABSENCE OF BOTH CERVIX AND UTERUS: Chronic | ICD-10-CM

## 2025-03-04 LAB — CYTOLOGY CVX/VAG DOC THIN PREP: ABNORMAL

## 2025-03-05 ENCOUNTER — APPOINTMENT (OUTPATIENT)
Dept: HEMATOLOGY ONCOLOGY | Facility: CLINIC | Age: 65
End: 2025-03-05
Payer: MEDICARE

## 2025-03-05 VITALS
OXYGEN SATURATION: 98 % | TEMPERATURE: 97.2 F | SYSTOLIC BLOOD PRESSURE: 127 MMHG | RESPIRATION RATE: 17 BRPM | DIASTOLIC BLOOD PRESSURE: 84 MMHG | HEART RATE: 76 BPM | BODY MASS INDEX: 35.26 KG/M2 | WEIGHT: 186.59 LBS

## 2025-03-05 DIAGNOSIS — Z17.0 MALIGNANT NEOPLASM OF UNSPECIFIED SITE OF LEFT FEMALE BREAST: ICD-10-CM

## 2025-03-05 DIAGNOSIS — C50.912 MALIGNANT NEOPLASM OF UNSPECIFIED SITE OF LEFT FEMALE BREAST: ICD-10-CM

## 2025-03-05 DIAGNOSIS — I89.0 LYMPHEDEMA, NOT ELSEWHERE CLASSIFIED: ICD-10-CM

## 2025-03-05 DIAGNOSIS — G62.0 DRUG-INDUCED POLYNEUROPATHY: ICD-10-CM

## 2025-03-05 DIAGNOSIS — C50.919 MALIGNANT NEOPLASM OF UNSPECIFIED SITE OF UNSPECIFIED FEMALE BREAST: ICD-10-CM

## 2025-03-05 DIAGNOSIS — N83.8 OTHER NONINFLAMMATORY DISORDERS OF OVARY, FALLOPIAN TUBE AND BROAD LIGAMENT: ICD-10-CM

## 2025-03-05 PROCEDURE — 99204 OFFICE O/P NEW MOD 45 MIN: CPT

## 2025-03-05 PROCEDURE — G2211 COMPLEX E/M VISIT ADD ON: CPT

## 2025-03-07 ENCOUNTER — APPOINTMENT (OUTPATIENT)
Dept: PULMONOLOGY | Facility: CLINIC | Age: 65
End: 2025-03-07
Payer: MEDICARE

## 2025-03-07 VITALS
TEMPERATURE: 98 F | SYSTOLIC BLOOD PRESSURE: 117 MMHG | BODY MASS INDEX: 35.5 KG/M2 | HEIGHT: 61 IN | DIASTOLIC BLOOD PRESSURE: 71 MMHG | HEART RATE: 99 BPM | WEIGHT: 188 LBS | OXYGEN SATURATION: 98 % | RESPIRATION RATE: 15 BRPM

## 2025-03-07 DIAGNOSIS — G47.33 OBSTRUCTIVE SLEEP APNEA (ADULT) (PEDIATRIC): ICD-10-CM

## 2025-03-07 PROCEDURE — 99204 OFFICE O/P NEW MOD 45 MIN: CPT

## 2025-03-07 PROCEDURE — G2211 COMPLEX E/M VISIT ADD ON: CPT

## 2025-03-10 PROBLEM — G47.33 OBSTRUCTIVE SLEEP APNEA, ADULT: Status: ACTIVE | Noted: 2025-03-10

## 2025-03-13 RX ORDER — SEMAGLUTIDE 0.68 MG/ML
2 INJECTION, SOLUTION SUBCUTANEOUS
Qty: 1 | Refills: 0 | Status: ACTIVE | COMMUNITY
Start: 2025-03-10

## 2025-04-05 ENCOUNTER — APPOINTMENT (OUTPATIENT)
Dept: INTERNAL MEDICINE | Facility: CLINIC | Age: 65
End: 2025-04-05
Payer: MEDICARE

## 2025-04-05 VITALS
DIASTOLIC BLOOD PRESSURE: 62 MMHG | HEART RATE: 94 BPM | HEIGHT: 61 IN | WEIGHT: 175 LBS | BODY MASS INDEX: 33.04 KG/M2 | SYSTOLIC BLOOD PRESSURE: 101 MMHG | TEMPERATURE: 96.9 F | OXYGEN SATURATION: 97 %

## 2025-04-05 DIAGNOSIS — J15.9 UNSPECIFIED BACTERIAL PNEUMONIA: ICD-10-CM

## 2025-04-05 PROCEDURE — G2211 COMPLEX E/M VISIT ADD ON: CPT

## 2025-04-05 PROCEDURE — 99213 OFFICE O/P EST LOW 20 MIN: CPT

## 2025-04-11 ENCOUNTER — OUTPATIENT (OUTPATIENT)
Dept: OUTPATIENT SERVICES | Facility: HOSPITAL | Age: 65
LOS: 1 days | End: 2025-04-11
Payer: MEDICARE

## 2025-04-11 ENCOUNTER — APPOINTMENT (OUTPATIENT)
Dept: RADIOLOGY | Facility: CLINIC | Age: 65
End: 2025-04-11
Payer: MEDICARE

## 2025-04-11 DIAGNOSIS — Z90.710 ACQUIRED ABSENCE OF BOTH CERVIX AND UTERUS: Chronic | ICD-10-CM

## 2025-04-11 DIAGNOSIS — Z90.13 ACQUIRED ABSENCE OF BILATERAL BREASTS AND NIPPLES: Chronic | ICD-10-CM

## 2025-04-11 DIAGNOSIS — Z98.890 OTHER SPECIFIED POSTPROCEDURAL STATES: Chronic | ICD-10-CM

## 2025-04-11 DIAGNOSIS — Z00.8 ENCOUNTER FOR OTHER GENERAL EXAMINATION: ICD-10-CM

## 2025-04-11 PROCEDURE — 71045 X-RAY EXAM CHEST 1 VIEW: CPT | Mod: 26

## 2025-04-11 PROCEDURE — 71045 X-RAY EXAM CHEST 1 VIEW: CPT

## 2025-04-14 ENCOUNTER — NON-APPOINTMENT (OUTPATIENT)
Age: 65
End: 2025-04-14

## 2025-05-09 RX ORDER — SEMAGLUTIDE 0.25 MG/.5ML
0.25 INJECTION, SOLUTION SUBCUTANEOUS
Qty: 4 | Refills: 0 | Status: ACTIVE | COMMUNITY
Start: 2025-05-09 | End: 1900-01-01

## 2025-06-26 ENCOUNTER — APPOINTMENT (OUTPATIENT)
Dept: INTERNAL MEDICINE | Facility: CLINIC | Age: 65
End: 2025-06-26
Payer: MEDICARE

## 2025-06-26 VITALS
TEMPERATURE: 97.2 F | DIASTOLIC BLOOD PRESSURE: 74 MMHG | OXYGEN SATURATION: 99 % | HEIGHT: 61 IN | WEIGHT: 176 LBS | HEART RATE: 68 BPM | BODY MASS INDEX: 33.23 KG/M2 | SYSTOLIC BLOOD PRESSURE: 125 MMHG

## 2025-06-26 PROCEDURE — 36415 COLL VENOUS BLD VENIPUNCTURE: CPT

## 2025-06-26 PROCEDURE — 99214 OFFICE O/P EST MOD 30 MIN: CPT

## 2025-06-26 PROCEDURE — G2211 COMPLEX E/M VISIT ADD ON: CPT

## 2025-06-26 RX ORDER — TRIAMCINOLONE ACETONIDE 1 MG/G
0.1 OINTMENT TOPICAL TWICE DAILY
Qty: 1 | Refills: 3 | Status: ACTIVE | COMMUNITY
Start: 2025-06-26 | End: 1900-01-01

## 2025-06-27 LAB
CHOLEST SERPL-MCNC: 224 MG/DL
HDLC SERPL-MCNC: 49 MG/DL
LDLC SERPL-MCNC: 162 MG/DL
NONHDLC SERPL-MCNC: 175 MG/DL
TRIGL SERPL-MCNC: 75 MG/DL

## 2025-06-27 RX ORDER — ATORVASTATIN CALCIUM 10 MG/1
10 TABLET, FILM COATED ORAL
Qty: 30 | Refills: 3 | Status: ACTIVE | COMMUNITY
Start: 2025-06-27 | End: 1900-01-01

## 2025-08-07 ENCOUNTER — OUTPATIENT (OUTPATIENT)
Dept: OUTPATIENT SERVICES | Facility: HOSPITAL | Age: 65
LOS: 1 days | End: 2025-08-07
Payer: MEDICARE

## 2025-08-07 ENCOUNTER — APPOINTMENT (OUTPATIENT)
Dept: ULTRASOUND IMAGING | Facility: CLINIC | Age: 65
End: 2025-08-07
Payer: MEDICARE

## 2025-08-07 DIAGNOSIS — Z98.890 OTHER SPECIFIED POSTPROCEDURAL STATES: Chronic | ICD-10-CM

## 2025-08-07 DIAGNOSIS — Z90.13 ACQUIRED ABSENCE OF BILATERAL BREASTS AND NIPPLES: Chronic | ICD-10-CM

## 2025-08-07 DIAGNOSIS — E78.00 PURE HYPERCHOLESTEROLEMIA, UNSPECIFIED: ICD-10-CM

## 2025-08-07 DIAGNOSIS — Z90.710 ACQUIRED ABSENCE OF BOTH CERVIX AND UTERUS: Chronic | ICD-10-CM

## 2025-08-07 PROCEDURE — 93880 EXTRACRANIAL BILAT STUDY: CPT

## 2025-08-07 PROCEDURE — 93880 EXTRACRANIAL BILAT STUDY: CPT | Mod: 26

## 2025-08-11 ENCOUNTER — NON-APPOINTMENT (OUTPATIENT)
Age: 65
End: 2025-08-11

## 2025-09-05 ENCOUNTER — LABORATORY RESULT (OUTPATIENT)
Age: 65
End: 2025-09-05

## 2025-09-05 ENCOUNTER — RESULT REVIEW (OUTPATIENT)
Age: 65
End: 2025-09-05

## 2025-09-05 ENCOUNTER — APPOINTMENT (OUTPATIENT)
Dept: HEMATOLOGY ONCOLOGY | Facility: CLINIC | Age: 65
End: 2025-09-05
Payer: MEDICARE

## 2025-09-05 VITALS
DIASTOLIC BLOOD PRESSURE: 71 MMHG | WEIGHT: 171.74 LBS | SYSTOLIC BLOOD PRESSURE: 113 MMHG | RESPIRATION RATE: 16 BRPM | OXYGEN SATURATION: 99 % | BODY MASS INDEX: 32.45 KG/M2 | HEART RATE: 73 BPM | TEMPERATURE: 97 F

## 2025-09-05 DIAGNOSIS — C50.912 MALIGNANT NEOPLASM OF UNSPECIFIED SITE OF LEFT FEMALE BREAST: ICD-10-CM

## 2025-09-05 LAB
ALBUMIN SERPL ELPH-MCNC: 3.9 G/DL
ALP BLD-CCNC: 94 U/L
ALT SERPL-CCNC: 16 U/L
ANION GAP SERPL CALC-SCNC: 11 MMOL/L
AST SERPL-CCNC: 24 U/L
BILIRUB SERPL-MCNC: 0.5 MG/DL
BUN SERPL-MCNC: 14 MG/DL
CALCIUM SERPL-MCNC: 9.1 MG/DL
CEA SERPL-MCNC: 4.1 NG/ML
CHLORIDE SERPL-SCNC: 103 MMOL/L
CO2 SERPL-SCNC: 24 MMOL/L
CREAT SERPL-MCNC: 0.71 MG/DL
EGFRCR SERPLBLD CKD-EPI 2021: 94 ML/MIN/1.73M2
GLUCOSE SERPL-MCNC: 85 MG/DL
MAGNESIUM SERPL-MCNC: 1.8 MG/DL
POTASSIUM SERPL-SCNC: 4.6 MMOL/L
PROT SERPL-MCNC: 6.7 G/DL
SODIUM SERPL-SCNC: 138 MMOL/L

## 2025-09-05 PROCEDURE — 99214 OFFICE O/P EST MOD 30 MIN: CPT

## 2025-09-05 PROCEDURE — G2211 COMPLEX E/M VISIT ADD ON: CPT

## 2025-09-08 ENCOUNTER — NON-APPOINTMENT (OUTPATIENT)
Age: 65
End: 2025-09-08

## 2025-09-08 DIAGNOSIS — R97.8 OTHER ABNORMAL TUMOR MARKERS: ICD-10-CM

## 2025-09-08 LAB — CANCER AG27-29 SERPL-ACNC: 32.8 U/ML
